# Patient Record
Sex: FEMALE | Race: WHITE | NOT HISPANIC OR LATINO | Employment: OTHER | URBAN - METROPOLITAN AREA
[De-identification: names, ages, dates, MRNs, and addresses within clinical notes are randomized per-mention and may not be internally consistent; named-entity substitution may affect disease eponyms.]

---

## 2017-01-15 ENCOUNTER — MYC MEDICAL ADVICE (OUTPATIENT)
Dept: PEDIATRICS | Facility: CLINIC | Age: 62
End: 2017-01-15

## 2017-01-26 ENCOUNTER — HOSPITAL ENCOUNTER (OUTPATIENT)
Facility: CLINIC | Age: 62
Setting detail: SPECIMEN
Discharge: HOME | End: 2017-01-26
Attending: INTERNAL MEDICINE | Admitting: INTERNAL MEDICINE
Payer: COMMERCIAL

## 2017-01-26 ENCOUNTER — ONCOLOGY VISIT (OUTPATIENT)
Dept: ONCOLOGY | Facility: CLINIC | Age: 62
End: 2017-01-26
Attending: INTERNAL MEDICINE
Payer: COMMERCIAL

## 2017-01-26 VITALS
HEART RATE: 65 BPM | RESPIRATION RATE: 16 BRPM | OXYGEN SATURATION: 97 % | TEMPERATURE: 98.5 F | WEIGHT: 161.2 LBS | SYSTOLIC BLOOD PRESSURE: 95 MMHG | HEIGHT: 68 IN | DIASTOLIC BLOOD PRESSURE: 69 MMHG | BODY MASS INDEX: 24.43 KG/M2

## 2017-01-26 DIAGNOSIS — D50.9 IRON DEFICIENCY ANEMIA, UNSPECIFIED IRON DEFICIENCY ANEMIA TYPE: ICD-10-CM

## 2017-01-26 DIAGNOSIS — C50.912 MALIGNANT NEOPLASM OF LEFT FEMALE BREAST, UNSPECIFIED SITE OF BREAST: Primary | ICD-10-CM

## 2017-01-26 LAB
ALBUMIN SERPL-MCNC: 3.9 G/DL (ref 3.4–5)
ALP SERPL-CCNC: 59 U/L (ref 40–150)
ALT SERPL W P-5'-P-CCNC: 23 U/L (ref 0–50)
ANION GAP SERPL CALCULATED.3IONS-SCNC: 6 MMOL/L (ref 3–14)
AST SERPL W P-5'-P-CCNC: 19 U/L (ref 0–45)
BILIRUB SERPL-MCNC: 0.3 MG/DL (ref 0.2–1.3)
BUN SERPL-MCNC: 17 MG/DL (ref 7–30)
CALCIUM SERPL-MCNC: 8.8 MG/DL (ref 8.5–10.1)
CHLORIDE SERPL-SCNC: 105 MMOL/L (ref 94–109)
CO2 SERPL-SCNC: 29 MMOL/L (ref 20–32)
CREAT SERPL-MCNC: 0.93 MG/DL (ref 0.52–1.04)
ERYTHROCYTE [DISTWIDTH] IN BLOOD BY AUTOMATED COUNT: 13.3 % (ref 10–15)
GFR SERPL CREATININE-BSD FRML MDRD: 61 ML/MIN/1.7M2
GLUCOSE SERPL-MCNC: 88 MG/DL (ref 70–99)
HCT VFR BLD AUTO: 35.8 % (ref 35–47)
HGB BLD-MCNC: 11.1 G/DL (ref 11.7–15.7)
IRON SATN MFR SERPL: 22 % (ref 15–46)
IRON SERPL-MCNC: 63 UG/DL (ref 35–180)
MCH RBC QN AUTO: 26.9 PG (ref 26.5–33)
MCHC RBC AUTO-ENTMCNC: 31 G/DL (ref 31.5–36.5)
MCV RBC AUTO: 87 FL (ref 78–100)
PLATELET # BLD AUTO: 385 10E9/L (ref 150–450)
POTASSIUM SERPL-SCNC: 4.3 MMOL/L (ref 3.4–5.3)
PROT SERPL-MCNC: 7.3 G/DL (ref 6.8–8.8)
RBC # BLD AUTO: 4.12 10E12/L (ref 3.8–5.2)
SODIUM SERPL-SCNC: 140 MMOL/L (ref 133–144)
TIBC SERPL-MCNC: 291 UG/DL (ref 240–430)
WBC # BLD AUTO: 4.9 10E9/L (ref 4–11)

## 2017-01-26 PROCEDURE — 83550 IRON BINDING TEST: CPT | Performed by: INTERNAL MEDICINE

## 2017-01-26 PROCEDURE — 83540 ASSAY OF IRON: CPT | Performed by: INTERNAL MEDICINE

## 2017-01-26 PROCEDURE — 80053 COMPREHEN METABOLIC PANEL: CPT | Performed by: INTERNAL MEDICINE

## 2017-01-26 PROCEDURE — 99211 OFF/OP EST MAY X REQ PHY/QHP: CPT

## 2017-01-26 PROCEDURE — 85027 COMPLETE CBC AUTOMATED: CPT | Performed by: INTERNAL MEDICINE

## 2017-01-26 PROCEDURE — 99214 OFFICE O/P EST MOD 30 MIN: CPT | Performed by: INTERNAL MEDICINE

## 2017-01-26 PROCEDURE — 36415 COLL VENOUS BLD VENIPUNCTURE: CPT

## 2017-01-26 RX ORDER — ANASTROZOLE 1 MG/1
1 TABLET ORAL DAILY
Qty: 90 TABLET | Refills: 3 | Status: SHIPPED | OUTPATIENT
Start: 2017-01-26 | End: 2018-02-21

## 2017-01-26 ASSESSMENT — PAIN SCALES - GENERAL: PAINLEVEL: NO PAIN (0)

## 2017-01-26 NOTE — PROGRESS NOTES
HISTORY OF PRESENT ILLNESS:  Mago Santiago is a 61-year-old patient who comes in today for interim followup.  She is here today for followup of left-sided breast cancer.  Mago was diagnosed with left-sided breast cancer in 09/2011, so she is just over 5 years out from her diagnosis.  She is continuing on adjuvant Arimidex.  She had an infiltrating ductal carcinoma of the left breast, ER/CT positive, HER-2 receptor negative.  She has no problems with the Arimidex and is happy to continue on it for the time being.      COMPREHENSIVE REVIEW OF SYSTEMS:  A 14-point comprehensive review of systems is remarkable for a good appetite, good energy level.  She has got no worrisome symptomatology.  She did have some lab work done in November which found that she was anemic so we are going to draw off iron studies today.  She does have celiac disease so she may have some issues with iron absorption.      MEDICATIONS:  As charted.      ALLERGIES:  As charted.      PHYSICAL EXAMINATION:   GENERAL:  She is well-appearing lady in no acute distress.   VITAL SIGNS:  Stable.   HEENT:  Oropharynx clear, mucous membranes moist.   NECK:  No masses or goiter.  There is no cervical, supraclavicular or infraclavicular adenopathy.   CHEST:  Clear to auscultation and percussion bilaterally.   HEART:  S1, S2 normal.  No added sounds or murmurs.   BREASTS:  The patient is status post left-sided lumpectomy, the scar looks good, no further palpable masses.  Right breast normal, no palpable masses, right and left axilla negative.   ABDOMEN:  Soft and nontender, no hepatosplenomegaly.   EXTREMITIES:  Legs are without tenderness or edema.   SKIN:  No rashes or lesions.   LYMPHATIC:  No evidence of lymphedema.      DATA REVIEW:  Labs are pending at time of dictation.      IMPRESSION:  A 61-year-old patient with a history of left-sided breast cancer.  She is just over 5 years out from her diagnosis.  She will continue on Arimidex.  I have ordered  routine blood work today to include iron studies.  She also needs a mammogram so have written her up for a routine mammogram.  I will be in touch with her with the results.  I will see her back in 6 months.         NESS MARCELINO MD             D: 2017 14:37   T: 2017 14:50   MT: ADA      Name:     DONATO FLORES   MRN:      [id number]        Account:      [id number]   :      1955           Visit Date:   2017      Document: [id number]

## 2017-01-26 NOTE — PROGRESS NOTES
"Mago Santiago is a 61 year old female who presents for:  Chief Complaint   Patient presents with     Oncology Clinic Visit     Malignant neoplasm of left female breast-Follow up        Initial Vitals:  Temp(Src) 98.5  F (36.9  C) (Tympanic)  Resp 16  Ht 1.727 m (5' 8\")  Wt 73.12 kg (161 lb 3.2 oz)  BMI 24.52 kg/m2 Estimated body mass index is 24.52 kg/(m^2) as calculated from the following:    Height as of this encounter: 1.727 m (5' 8\").    Weight as of this encounter: 73.12 kg (161 lb 3.2 oz).. Body surface area is 1.87 meters squared. BP completed using cuff size: regular  No Pain (0) No LMP recorded. Patient is postmenopausal. Allergies and medications reviewed.     Medications: Medication refills not needed today.  Pharmacy name entered into CIBDO: [facility] [id number] [place] [address] - [address] [facility]  AT [facility]    Comments: Follow up, patient would like a refill on arimidex    8 minutes for nursing intake (face to face time)   Beth Bower  DISCHARGE PLAN:  Next appointments: See patient instruction section  Departure Mode: Ambulatory  Accompanied by: self  0 minutes for nursing discharge (face to face time)   Beth Bower            "

## 2017-01-26 NOTE — PATIENT INSTRUCTIONS
Labs today -Beth       mammo next avail-Scheduled for 1/31/2017 @ 4:15 at [facility], per Pt request. Enriqueta HOYOS      RTC MD 6 months-Scheduled for 7/26/2017 @ 11:30. Enriqueta KERR printed and given to Pt. Aida.

## 2017-01-26 NOTE — MR AVS SNAPSHOT
After Visit Summary   1/26/2017    Mago Santiago    MRN: [id number]           Patient Information     Date Of Birth          1955        Visit Information        Provider Department      1/26/2017 1:30 PM Joyce Barillas MD [facility] RH Oncology [facility]      Today's Diagnoses     Malignant neoplasm of left female breast, unspecified site of breast (H)    -  1     Iron deficiency anemia, unspecified iron deficiency anemia type           Care Instructions    Labs today -Beth       mammo next avail-Scheduled for 1/31/2017 @ 4:15 at [facility], per Pt request. Enriqueta HOYOS      RTARGENTINA MAN 6 months-Scheduled for 7/26/2017 @ 11:30. Enriqueta HOYOS  AVS printed and given to Pt. Aida.          Follow-ups after your visit        Your next 10 appointments already scheduled     Jan 31, 2017  4:15 PM   MA SCREENING BILATERAL W/ RAMÓN with EAMA1   [facility] ([facility])    [address] ,[address]  [facility] [id number] [phone]           Do not use any powder, lotion or deodorant under your arms or on your breast. If you do, we will ask you to remove it before your exam.  Wear comfortable, two-piece clothing.  If you have any allergies, tell your care team.  Bring any previous mammograms from other facilities or have them mailed to the breast center.            Jul 26, 2017 11:30 AM   Return Visit with Joyce Barillas MD   [facility] ([facility])    [facility]  [address]  [facility] [address] [phone]              Future tests that were ordered for you today     Open Future Orders        Priority Expected Expires Ordered    MA Screen Bilateral w/Ramón Routine 1/30/2017 1/26/2018 1/26/2017            Who to contact     If you have questions or need follow up information about today's clinic visit or your schedule please contact [facility]  "CARE directly at [phone].  Normal or non-critical lab and imaging results will be communicated to you by MyChart, letter or phone within 4 business days after the clinic has received the results. If you do not hear from us within 7 days, please contact the clinic through [employer] or phone. If you have a critical or abnormal lab result, we will notify you by phone as soon as possible.  Submit refill requests through [email or web address] or call your pharmacy and they will forward the refill request to us. Please allow 3 business days for your refill to be completed.          Additional Information About Your Visit        [employer] Information     [email or web address] gives you secure access to your electronic health record. If you see a primary care provider, you can also send messages to your care team and make appointments. If you have questions, please call your primary care clinic.  If you do not have a primary care provider, please call [phone] and they will assist you.        Care EveryWhere ID     This is your Care EveryWhere ID. This could be used by other organizations to access your [place] medical records  [id number]        Your Vitals Were     Pulse Temperature Respirations Height BMI (Body Mass Index) Pulse Oximetry    65 98.5  F (36.9  C) (Tympanic) 16 1.727 m (5' 8\") 24.52 kg/m2 97%       Blood Pressure from Last 3 Encounters:   01/26/17 95/69   11/22/16 100/60   09/09/16 104/62    Weight from Last 3 Encounters:   01/26/17 73.12 kg (161 lb 3.2 oz)   11/22/16 73.256 kg (161 lb 8 oz)   09/09/16 71.782 kg (158 lb 4 oz)              We Performed the Following     CBC with platelets     Comprehensive metabolic panel (BMP + Alb, Alk Phos, ALT, AST, Total. Bili, TP)     Iron and iron binding capacity          Where to get your medicines      These medications were sent to [id number] - [place], [place] - [address]  AT [facility]  [address] [place] [address]     Phone:  " [phone]    - anastrozole 1 MG tablet       Primary Care Provider Office Phone # Fax #    Hanny Kunz [phone]       [facility] [place] [address] [place] [address]        Thank you!     Thank you for choosing [facility]  for your care. Our goal is always to provide you with excellent care. Hearing back from our patients is one way we can continue to improve our services. Please take a few minutes to complete the written survey that you may receive in the mail after your visit with us. Thank you!             Your Updated Medication List - Protect others around you: Learn how to safely use, store and throw away your medicines at www.disposemymeds.org.          This list is accurate as of: 1/26/17  2:50 PM.  Always use your most recent med list.                   Brand Name Dispense Instructions for use    anastrozole 1 MG tablet    ARIMIDEX    90 tablet    Take 1 tablet (1 mg) by mouth daily       ASPIRIN PO      Take 81 mg by mouth       FLUoxetine 20 MG capsule    PROzac    90 capsule    Take 1 capsule (20 mg) by mouth daily       multivitamin, therapeutic Tabs tablet      Take 1 tablet by mouth daily

## 2017-01-27 ENCOUNTER — TELEPHONE (OUTPATIENT)
Dept: ONCOLOGY | Facility: CLINIC | Age: 62
End: 2017-01-27

## 2017-01-27 NOTE — TELEPHONE ENCOUNTER
Pt called and informed that iron studies normal and hgb improving at 11.1 per Dr Barillas.  No further recommendations at this time but left message for pt to call back with further questions or concerns.    Mirta Lamb RN, BSN

## 2017-01-31 ENCOUNTER — RADIANT APPOINTMENT (OUTPATIENT)
Dept: MAMMOGRAPHY | Facility: CLINIC | Age: 62
End: 2017-01-31
Attending: INTERNAL MEDICINE
Payer: COMMERCIAL

## 2017-01-31 DIAGNOSIS — C50.912 MALIGNANT NEOPLASM OF LEFT FEMALE BREAST, UNSPECIFIED SITE OF BREAST: ICD-10-CM

## 2017-01-31 PROCEDURE — G0202 SCR MAMMO BI INCL CAD: HCPCS | Mod: TC

## 2017-01-31 PROCEDURE — 77063 BREAST TOMOSYNTHESIS BI: CPT | Mod: TC

## 2017-03-26 ENCOUNTER — VIRTUAL VISIT (OUTPATIENT)
Dept: FAMILY MEDICINE | Facility: OTHER | Age: 62
End: 2017-03-26

## 2017-03-27 NOTE — PROGRESS NOTES
Date: [id number]  Clinician: Didier Odell  Clinician NPI: [id number]  Patient: Mago Santiago  Patient : 1956  Patient Address: [facility] [place] [address]  Patient Phone: [phone]  Visit Protocol: UTI  Patient Summary:  Mago is a 60 year old ( : 1956 ) female who initiated a Zip for a presumed bladder infection.     Her symptoms began yesterday and consist of urgency, dysuria, urinary frequency, and foul smelling urine.   Symptom Details   Urinary Frequency: Several times each hour    She denies nausea, loss of appetite, chills, fever, urinary incontinence, hesitation, vaginal discharge, abdominal pain, vomiting, recent antibiotic use, flank pain, and hematuria. Mago has never had kidney stones. She has not been hospitalized, been a patient in a nursing home, or had a catheter in the past two weeks. She denies risk factors for sexually transmitted infections.   Mago has not had any UTIs in the past 12 months. Her current symptoms are similar to the previous UTI symptoms. She took ciprofloxacin for her last infection and found it to be effective.   She has experienced side effects (upset stomach, vomiting, or diarrhea) from taking Bactrim DS (trimethoprim/sulfamethoxazole). Mago does not get yeast infections when she takes antibiotics.   She states she is not pregnant and denies breastfeeding. She no longer menstruates.   She does NOT smoke or use smokeless tobacco.   MEDICATIONS:   Patient free text response:  Anastozole  , ALLERGIES:  NKDA   Clinician Response:  Dear Mago,  Based on the information you have provided, you likely have a bladder infection, also called an acute urinary tract infection (UTI).   To treat your infection, I am prescribing:   Nitrofurantoin (Macrobid). Swallow one (1) tablet twice a day for 5 days. Take the tablet with food. Continue taking the tablets even if you feel better before all the medication is gone. There is no refill with this  prescription.   Antibiotic selections by the clinician are based on safety and effectiveness. You may or may not be prescribed the same medication that you took for your last bladder infection.   Some people develop allergies to antibiotics. If you notice a new rash, significant swelling, or difficulty breathing, stop the medication immediately and go into a clinic for physical evaluation.   To help treat your current UTI and prevent future occurrences, remember to:     Drink 8-10, 8-ounce glasses of water daily.    Urinate after sexual intercourse.    Wipe front to back after using the bathroom.     Some women may develop a yeast infection as a side effect of taking antibiotics. If you notice symptoms of a yeast infection, Zipnosis can help treat that condition as well. Simply log in and complete another Zip, which will cover all of the necessary questions to determine the best treatment for you.   You should visit a clinic for a follow-up visit if your symptoms do not improve in 1-2 days or if you experience another urinary tract infection soon after completing this treatment.  If you become pregnant during this course of treatment, stop taking the medication and contact your primary care clinician.   Diagnosis: Acute Uncomplicated Bladder Infection  Diagnosis ICD: N39.0  Prescription: nitrofurantoin (Macrobid) 100mg oral tablet 10 tablets, 5 days supply. Take one tablet by mouth two times a day for 5 days. Refills: 0, Refill as needed: no, Allow substitutions: yes

## 2017-03-28 ENCOUNTER — TRANSFERRED RECORDS (OUTPATIENT)
Dept: HEALTH INFORMATION MANAGEMENT | Facility: CLINIC | Age: 62
End: 2017-03-28

## 2017-07-26 ENCOUNTER — ONCOLOGY VISIT (OUTPATIENT)
Dept: ONCOLOGY | Facility: CLINIC | Age: 62
End: 2017-07-26
Attending: INTERNAL MEDICINE
Payer: COMMERCIAL

## 2017-07-26 VITALS
DIASTOLIC BLOOD PRESSURE: 69 MMHG | BODY MASS INDEX: 23.49 KG/M2 | OXYGEN SATURATION: 97 % | SYSTOLIC BLOOD PRESSURE: 102 MMHG | RESPIRATION RATE: 16 BRPM | WEIGHT: 155 LBS | HEIGHT: 68 IN | TEMPERATURE: 98.4 F | HEART RATE: 68 BPM

## 2017-07-26 DIAGNOSIS — Z17.0 MALIGNANT NEOPLASM OF LEFT BREAST IN FEMALE, ESTROGEN RECEPTOR POSITIVE, UNSPECIFIED SITE OF BREAST (H): Primary | ICD-10-CM

## 2017-07-26 DIAGNOSIS — C50.912 MALIGNANT NEOPLASM OF LEFT BREAST IN FEMALE, ESTROGEN RECEPTOR POSITIVE, UNSPECIFIED SITE OF BREAST (H): Primary | ICD-10-CM

## 2017-07-26 PROCEDURE — 99211 OFF/OP EST MAY X REQ PHY/QHP: CPT

## 2017-07-26 PROCEDURE — 99213 OFFICE O/P EST LOW 20 MIN: CPT | Performed by: INTERNAL MEDICINE

## 2017-07-26 ASSESSMENT — PAIN SCALES - GENERAL: PAINLEVEL: NO PAIN (0)

## 2017-07-26 NOTE — MR AVS SNAPSHOT
After Visit Summary   7/26/2017    Mago Santiago    MRN: [id number]           Patient Information     Date Of Birth          1955        Visit Information        Provider Department      7/26/2017 8:00 AM Joyce Barillas MD [facility] RH Oncology [facility]      Today's Diagnoses     Malignant neoplasm of left breast in female, estrogen receptor positive, unspecified site of breast (H)    -  1      Care Instructions        RTC MD    6 months       Labs and bone density on return     Mammo DEC or JAN 07/31/2017 left message for patient to call and schedule. Jovita Guzmán          Follow-ups after your visit        Your next 10 appointments already scheduled     Dec 21, 2017  3:30 PM CST   DX HIP/PELVIS/SPINE with EADX1   [facility]    [address] [place] [address] [phone]           Please do not take any of the following 24 hours prior to the day of your exam: vitamins, calcium tablets, antacids.  If possible, please wear clothes without metal (snaps, zippers). A sweatsuit works well.              Who to contact     If you have questions or need follow up information about today's clinic visit or your schedule please contact [facility]CER CARE directly at [phone].  Normal or non-critical lab and imaging results will be communicated to you by MyChart, letter or phone within 4 business days after the clinic has received the results. If you do not hear from us within 7 days, please contact the clinic through MyChart or phone. If you have a critical or abnormal lab result, we will notify you by phone as soon as possible.  Submit refill requests through [employer] or call your pharmacy and they will forward the refill request to us. Please allow 3 business days for your refill to be completed.          Additional Information About Your Visit        MyChart Information      "[employer] gives you secure access to your electronic health record. If you see a primary care provider, you can also send messages to your care team and make appointments. If you have questions, please call your primary care clinic.  If you do not have a primary care provider, please call [phone] and they will assist you.        Care EveryWhere ID     This is your Care EveryWhere ID. This could be used by other organizations to access your [facility] medical records  [id number]        Your Vitals Were     Pulse Temperature Respirations Height Pulse Oximetry BMI (Body Mass Index)    68 98.4  F (36.9  C) (Tympanic) 16 1.727 m (5' 8\") 97% 23.57 kg/m2       Blood Pressure from Last 3 Encounters:   07/26/17 102/69   01/26/17 95/69   11/22/16 100/60    Weight from Last 3 Encounters:   07/26/17 70.3 kg (155 lb)   01/26/17 73.1 kg (161 lb 3.2 oz)   11/22/16 73.3 kg (161 lb 8 oz)               Primary Care Provider Office Phone # Fax #    Hanny Kunz [phone]       [facility] [place] [address] [place] [address]        Equal Access to Services     RICK BISHOP AH: Hadii aad ku hadasho Soomaali, waaxda luqadaha, qaybta kaalmada adeegyada, waxay idiin haydoloresn simon schmidt lajohanny jonas. So [facility] [phone].    ATENCIÓN: Si habla español, tiene a mckeon disposición servicios gratuitos de asistencia lingüística. Llame al [phone].    We comply with applicable federal civil rights laws and [place] laws. We do not discriminate on the basis of race, color, national origin, age, disability sex, sexual orientation or gender identity.            Thank you!     Thank you for choosing [facility]  for your care. Our goal is always to provide you with excellent care. Hearing back from our patients is one way we can continue to improve our services. Please take a few minutes to complete the written survey that you may receive in the mail after your visit with us. Thank " you!             Your Updated Medication List - Protect others around you: Learn how to safely use, store and throw away your medicines at www.disposemymeds.org.          This list is accurate as of: 7/26/17 11:59 PM.  Always use your most recent med list.                   Brand Name Dispense Instructions for use Diagnosis    anastrozole 1 MG tablet    ARIMIDEX    90 tablet    Take 1 tablet (1 mg) by mouth daily    Malignant neoplasm of left female breast, unspecified site of breast       ASPIRIN PO      Take 81 mg by mouth        FLUoxetine 20 MG capsule    PROzac    90 capsule    Take 1 capsule (20 mg) by mouth daily    Anxiety       multivitamin, therapeutic Tabs tablet      Take 1 tablet by mouth daily

## 2017-07-26 NOTE — PATIENT INSTRUCTIONS
RTC MD    6 months       Labs and bone density on return     Mammo DEC or JAN 07/31/2017 left message for patient to call and schedule. Jovita Guzmán

## 2017-07-26 NOTE — PROGRESS NOTES
Hematology / Oncology Progress Note    Date of Service (when I saw the patient): 07/26/2017     Assessment & Plan   Mago Santiago is a 61 year old female breast cancer and she is coming up on 6 years out from her diagnosis her original diagnosis was September 2011. Her tumor was ER/RI positive HER-2 receptor negative. Her tumor was on the left breast. She's had no major problems with Arimidex we have decided to continue on it but we will do a bone density test at the end of this year to see if she's had any issues with her bones. She will also be a mammogram at the early part of 2018 and I will see her back in clinic after she has that done.        Joyce Barillas    Interval History   Mago Santiago is a 61-year-old patient who comes in today for interim followup.  She is here today for followup of left-sided breast cancer.  Mago was diagnosed with left-sided breast cancer in 09/2011, so she is just over 5 years out from her diagnosis.  She is continuing on adjuvant Arimidex.  She had an infiltrating ductal carcinoma of the left breast, ER/RI positive, HER-2 receptor negative.  She has no problems with the Arimidex and is happy to continue on it for the time being. She has no major complaints today 14 point review of systems is otherwise unremarkable.    Medications are as charted. Allergies are as charted     Physical Exam   Temp: 98.4  F (36.9  C) Temp src: Tympanic BP: 102/69 Pulse: 68   Resp: 16 SpO2: 97 %      Vitals:    07/26/17 0800   Weight: 70.3 kg (155 lb)     Vital Signs with Ranges  Temp:  [98.4  F (36.9  C)] 98.4  F (36.9  C)  Pulse:  [68] 68  Resp:  [16] 16  BP: (102)/(69) 102/69  SpO2:  [97 %] 97 %  Constitutional: awake, alert, cooperative, no apparent distress, and appears stated age  Eyes: Lids and lashes normal, pupils equal, round and reactive to light, extra ocular muscles intact, sclera clear, conjunctiva normal  ENT: Normocephalic, without obvious abnormality, atramatic,  sinuses nontender on palpation, external ears without lesions, oral pharynx with moist mucus membranes, tonsils without erythema or exudates, gums normal and good dentition.  Hematologic / Lymphatic: normal   Respiratory: No increased work of breathing, good air exchange, clear to auscultation bilaterally, no crackles or wheezing   Cardiovascular: Normal apical impulse, regular rate and rhythm, normal S1 and S2, no S3 or S4, and no murmur noted   GI:  Normal    Skin: normal skin color, texture, turgor  Neurologic: Awake, alert, oriented to name, place and time.             Data   No labs today

## 2017-07-26 NOTE — NURSING NOTE
"Oncology Rooming Note    July 26, 2017 8:03 AM   Mago Santiago is a 61 year old female who presents for:    Chief Complaint   Patient presents with     Oncology Clinic Visit     Malignant neoplasm of central portion of left female breast - follow up     Initial Vitals: /69  Pulse 68  Temp 98.4  F (36.9  C) (Tympanic)  Resp 16  Ht 1.727 m (5' 8\")  Wt 70.3 kg (155 lb)  SpO2 97%  BMI 23.57 kg/m2 Estimated body mass index is 23.57 kg/(m^2) as calculated from the following:    Height as of this encounter: 1.727 m (5' 8\").    Weight as of this encounter: 70.3 kg (155 lb). Body surface area is 1.84 meters squared.  No Pain (0) Comment: Data Unavailable   No LMP recorded. Patient is postmenopausal.  Allergies reviewed: Yes  Medications reviewed: Yes    Medications: Medication refills not needed today.  Pharmacy name entered into [employer]: [facility] [place], [place] - [address]  AT [facility]    Clinical concerns: Follow up     8 minutes for nursing intake (face to face time)     Cara Stephen CMA     DISCHARGE PLAN:  Next appointments: See patient instruction section  Departure Mode: Ambulatory  Accompanied by: self  0 minutes for nursing discharge (face to face time)   Cara Stephen CMA                  "

## 2017-08-15 ENCOUNTER — TRANSFERRED RECORDS (OUTPATIENT)
Dept: HEALTH INFORMATION MANAGEMENT | Facility: CLINIC | Age: 62
End: 2017-08-15

## 2017-09-21 ENCOUNTER — OFFICE VISIT (OUTPATIENT)
Dept: PEDIATRICS | Facility: CLINIC | Age: 62
End: 2017-09-21
Payer: COMMERCIAL

## 2017-09-21 VITALS
BODY MASS INDEX: 23.79 KG/M2 | SYSTOLIC BLOOD PRESSURE: 102 MMHG | OXYGEN SATURATION: 97 % | DIASTOLIC BLOOD PRESSURE: 60 MMHG | WEIGHT: 157 LBS | HEART RATE: 83 BPM | RESPIRATION RATE: 12 BRPM | HEIGHT: 68 IN

## 2017-09-21 DIAGNOSIS — Z12.11 SPECIAL SCREENING FOR MALIGNANT NEOPLASMS, COLON: ICD-10-CM

## 2017-09-21 DIAGNOSIS — M25.521 RIGHT ELBOW PAIN: Primary | ICD-10-CM

## 2017-09-21 DIAGNOSIS — F41.9 ANXIETY: ICD-10-CM

## 2017-09-21 PROCEDURE — 99214 OFFICE O/P EST MOD 30 MIN: CPT | Performed by: INTERNAL MEDICINE

## 2017-09-21 NOTE — NURSING NOTE
"Chief Complaint   Patient presents with     Musculoskeletal Problem       Initial /60 (BP Location: Right arm, Patient Position: Chair, Cuff Size: Adult Regular)  Pulse 83  Resp 12  Ht 5' 8\" (1.727 m)  Wt 157 lb (71.2 kg)  SpO2 97%  BMI 23.87 kg/m2 Estimated body mass index is 23.87 kg/(m^2) as calculated from the following:    Height as of this encounter: 5' 8\" (1.727 m).    Weight as of this encounter: 157 lb (71.2 kg).  Medication Reconciliation: complete  Anni Chino CMA  "

## 2017-09-21 NOTE — PROGRESS NOTES
SUBJECTIVE:   Mago Santiago is a 62 year old female who presents to clinic today for the following health issues:    Joint Pain    Onset: 3-4 weeks ago    Description:   Location: right elbow  Character: Sharp and shooting    Intensity: moderate    Progression of Symptoms: same    Accompanying Signs & Symptoms:  Other symptoms: radiation of pain to shoulder and hand occasionally     History:   Previous similar pain: no       Precipitating factors:   Trauma or overuse: no     Alleviating factors:  Improved by: NSAID - doesn't know if it helped or not, but didn't wake up during the night from the pain.    Therapies Tried and outcome: see above      Mago comes in for evaluation of R elbow pain that started about a month ago. Pain comes and goes. Seems to get worse when she fully straightens the arm, and sometimes when it is bent. Not worse with regular movement. Pain is mostly around elbow and in lateral elbow area. Will occasionally radiate down her arm. No other joint pain. No redness or injury.     Needs refill of fluoxetine for her anxiety. Stable, no medication side effects.     Problem list and histories reviewed & adjusted, as indicated.  Additional history: as documented    Patient Active Problem List   Diagnosis     Advanced directives, counseling/discussion     Malignant neoplasm of central portion of left female breast (HCC)     Family history of multiple sclerosis     Anxiety     Celiac disease     Past Surgical History:   Procedure Laterality Date     ENT SURGERY      wisdom teeth     LASIK  1995    right eye     LUMPECTOMY BREAST  2011    left ; breast cancer        Social History   Substance Use Topics     Smoking status: Never Smoker     Smokeless tobacco: Never Used     Alcohol use 0.0 oz/week     0 Standard drinks or equivalent per week      Comment: Seldom     Family History   Problem Relation Age of Onset     Genetic Disorder Mother      MS     Prostate Cancer Father      Genetic Disorder  "Sister      MS     DIABETES Daughter      Type 1             Reviewed and updated as needed this visit by clinical staffTobacco  Allergies  Meds  Med Hx  Surg Hx  Fam Hx  Soc Hx      ROS:  Constitutional, MSK systems are negative, except as otherwise noted.      OBJECTIVE:   /60 (BP Location: Right arm, Patient Position: Chair, Cuff Size: Adult Regular)  Pulse 83  Resp 12  Ht 5' 8\" (1.727 m)  Wt 157 lb (71.2 kg)  SpO2 97%  BMI 23.87 kg/m2  Body mass index is 23.87 kg/(m^2).  GENERAL: healthy, alert and no distress  MS: R elbow with full ROM, no swelling or erythema. Mild tenderness over lateral epicondyle and olecranon.     Diagnostic Test Results:  none     ASSESSMENT/PLAN:     1. Anxiety  Stable, medication refilled.   - FLUoxetine (PROZAC) 20 MG capsule; Take 1 capsule (20 mg) by mouth daily  Dispense: 90 capsule; Refill: 3    2. Right elbow pain  Likely lateral epicondylitis vs OA. Discussed management with NSAIDs, ice, and physical therapy. If not improving would obtain imaging and follow-up with sports med.   - [facility] PT, HAND, AND CHIROPRACTIC REFERRAL    3. Special screening for malignant neoplasms, colon  - GASTROENTEROLOGY ADULT REF PROCEDURE ONLY    Patient Instructions   Elbow pain:  -- probably tennis elbow or a little bit of arthritis  -- start Aleve twice daily with food for 4-5 days  -- ice twice daily  -- if you're not noticing improvement, follow-up with physical therapy    We can always send you off to Sports Medicine as well!    You will be called to set up the colonoscopy.         Hanny Nava MD  [facility]  "

## 2017-09-21 NOTE — MR AVS SNAPSHOT
After Visit Summary   9/21/2017    Mago Santiago    MRN: [id number]           Patient Information     Date Of Birth          1955        Visit Information        Provider Department      9/21/2017 2:30 PM Hanny Kunz MD [facility]        Today's Diagnoses     Right elbow pain    -  1    Anxiety        Special screening for malignant neoplasms, colon          Care Instructions    Elbow pain:  -- probably tennis elbow or a little bit of arthritis  -- start Aleve twice daily with food for 4-5 days  -- ice twice daily  -- if you're not noticing improvement, follow-up with physical therapy    We can always send you off to Sports Medicine as well!    You will be called to set up the colonoscopy.             Follow-ups after your visit        Additional Services     GASTROENTEROLOGY ADULT REF PROCEDURE ONLY       Last Lab Result: Creatinine (mg/dL)       Date                     Value                 01/26/2017               0.93             ----------  Body mass index is 23.87 kg/(m^2).      Patient will be contacted to schedule procedure.     Please be aware that coverage of these services is subject to the terms and limitations of your health insurance plan.  Call member services at your health plan with any benefit or coverage questions.  Any procedures must be performed at a [facility] facility OR coordinated by your clinic's referral office.    Please bring the following with you to your appointment:    (1) Any X-Rays, CTs or MRIs which have been performed.  Contact the facility where they were done to arrange for  prior to your scheduled appointment.    (2) List of current medications   (3) This referral request   (4) Any documents/labs given to you for this referral            [facility] PT, HAND, AND CHIROPRACTIC REFERRAL       **This order will print in the [facility] Scheduling Office**    Physical Therapy, Hand Therapy and Chiropractic Care are available through:    *[facility]  "[facility]  *[facility]  *[facility]    Call one number to schedule at any of the above locations: [phone].    Your provider has referred you to: Physical Therapy at [facility] or [facility]    Indication/Reason for Referral: Elbow Pain  Onset of Illness: 1 month  Therapy Orders: Evaluate and Treat  Special Programs: None  Special Request: None    Brittni Elkins      Additional Comments for the Therapist or Chiropractor:     Please be aware that coverage of these services is subject to the terms and limitations of your health insurance plan.  Call member services at your health plan with any benefit or coverage questions.      Please bring the following to your appointment:    *Your personal calendar for scheduling future appointments  *Comfortable clothing                  Your next 10 appointments already scheduled     Dec 21, 2017  3:30 PM CST   DX HIP/PELVIS/SPINE with EADX1   [facility] ([facility])    [address] [place] [address]  [address]  [facility] [address] [phone]           Please do not take any of the following 24 hours prior to the day of your exam: vitamins, calcium tablets, antacids.  If possible, please wear clothes without metal (snaps, zippers). A sweatsuit works well.            Feb 06, 2018  4:15 PM CST   MA SCREENING BILATERAL W/ EULALIA with EAMA1   [facility] ([facility])    [address] [place] [address],[address]  [facility] [address] [phone]           Three-dimensional (3D) mammograms are available at [facility] in [facility], [facility], [facility], [place], [place], and [place]. -Health locations include [facility] in [place]. Benefits of 3D mammograms include: - Improved rate of cancer detection - Decreases your chance of having to go back for more tests, which means fewer: - \"False-positive\" results (This means that there is an " abnormal area but it isn't cancer.) - Invasive testing procedures, such as a biopsy or surgery - Can provide clearer images of the breast if you have dense breast tissue. 3D mammography is an optional exam that anyone can have with a 2D mammogram. It doesn't replace or take the place of a 2D mammogram. 2D mammograms remain an effective screening test for all women.  Not all insurance companies cover the cost of a 3D mammogram. Check with your insurance.            Feb 14, 2018  2:30 PM CST   Return Visit with Joyce Barillas MD   [facility] ([facility])    [facility]  [address]  [facility] [address] [phone]              Who to contact     If you have questions or need follow up information about today's clinic visit or your schedule please contact [facility] directly at [phone].  Normal or non-critical lab and imaging results will be communicated to you by [employer], letter or phone within 4 business days after the clinic has received the results. If you do not hear from us within 7 days, please contact the clinic through [employer] or phone. If you have a critical or abnormal lab result, we will notify you by phone as soon as possible.  Submit refill requests through [employer] or call your pharmacy and they will forward the refill request to us. Please allow 3 business days for your refill to be completed.          Additional Information About Your Visit        [employer] Information     [employer] gives you secure access to your electronic health record. If you see a primary care provider, you can also send messages to your care team and make appointments. If you have questions, please call your primary care clinic.  If you do not have a primary care provider, please call [phone] and they will assist you.        Care EveryWhere ID     This is your Care EveryWhere ID. This could be used by other organizations to  "access your [facility] medical records  [id number]        Your Vitals Were     Pulse Respirations Height Pulse Oximetry BMI (Body Mass Index)       83 12 5' 8\" (1.727 m) 97% 23.87 kg/m2        Blood Pressure from Last 3 Encounters:   09/21/17 102/60   07/26/17 102/69   01/26/17 95/69    Weight from Last 3 Encounters:   09/21/17 157 lb (71.2 kg)   07/26/17 155 lb (70.3 kg)   01/26/17 161 lb 3.2 oz (73.1 kg)              We Performed the Following     GASTROENTEROLOGY ADULT REF PROCEDURE ONLY     [facility] PT, HAND, AND CHIROPRACTIC REFERRAL          Where to get your medicines      These medications were sent to [id number] [place] - [address]  AT [facility]  [address] [facility] [place] [address]     Phone:  [phone]     FLUoxetine 20 MG capsule          Primary Care Provider Office Phone # Fax #    Hanny Kunz [phone] [address] [place] [address]        Equal Access to Services     [facility]: Hadii aad ku hadasho Soomaali, waaxda luqadaha, qaybta kaalmada adeegyada, elli tianin hayaan adedenzel mcadams . So [facility] [place] [phone].    ATENCIÓN: Si habla español, tiene a mckeon disposición servicios gratuitos de asistencia lingüística. Ll[facility] [phone].    We comply with applicable federal civil rights laws and [place] laws. We do not discriminate on the basis of race, color, national origin, age, disability sex, sexual orientation or gender identity.            Thank you!     Thank you for choosing [facility]  for your care. Our goal is always to provide you with excellent care. Hearing back from our patients is one way we can continue to improve our services. Please take a few minutes to complete the written survey that you may receive in the mail after your visit with us. Thank you!             Your Updated Medication List - Protect others around you: Learn how to safely use, store and throw away your " medicines at www.disposemymeds.org.          This list is accurate as of: 9/21/17  2:55 PM.  Always use your most recent med list.                   Brand Name Dispense Instructions for use Diagnosis    anastrozole 1 MG tablet    ARIMIDEX    90 tablet    Take 1 tablet (1 mg) by mouth daily    Malignant neoplasm of left female breast, unspecified site of breast       ASPIRIN PO      Take 81 mg by mouth        FLUoxetine 20 MG capsule    PROzac    90 capsule    Take 1 capsule (20 mg) by mouth daily    Anxiety       multivitamin, therapeutic Tabs tablet      Take 1 tablet by mouth daily

## 2017-09-21 NOTE — PATIENT INSTRUCTIONS
Elbow pain:  -- probably tennis elbow or a little bit of arthritis  -- start Aleve twice daily with food for 4-5 days  -- ice twice daily  -- if you're not noticing improvement, follow-up with physical therapy    We can always send you off to Sports Medicine as well!    You will be called to set up the colonoscopy.

## 2017-09-26 ENCOUNTER — MYC MEDICAL ADVICE (OUTPATIENT)
Dept: PEDIATRICS | Facility: CLINIC | Age: 62
End: 2017-09-26

## 2017-09-27 NOTE — TELEPHONE ENCOUNTER
Sent message to [facility] informing of pt's colonoscopy and to update Health Maintenance     Milana Bhatia MA 9/27/2017 4:49 PM

## 2017-09-27 NOTE — TELEPHONE ENCOUNTER
See pt's [facility] message. Please update health maintenance for colonoscopy.     Called [facility], I was notified that pt's last colonoscopy was on 05/04/09. They will fax us the report to [phone].     Reyna, RN  Triage Nurse

## 2018-01-06 ENCOUNTER — VIRTUAL VISIT (OUTPATIENT)
Dept: FAMILY MEDICINE | Facility: OTHER | Age: 63
End: 2018-01-06

## 2018-01-06 NOTE — PROGRESS NOTES
"Date:   Clinician: Cristela Martinez  Clinician NPI: [id number]  Patient: Mago Santiago  Patient : 1956  Patient Address[facility] [place] [address]  Patient Phone: [phone]  Visit Protocol: UTI  Patient Summary:  Mago is a 61 year old ( : 1956 ) female who initiated a Visit for a presumed bladder infection. When asked the question \"Please sign me up to receive news, health information and promotions. \", Mago responded \"No\".    Her symptoms began 2 days ago and consist of dysuria, urinary frequency, foul smelling urine, and urgency.   Symptom Details   Urinary Frequency: Several times each hour    She denies abdominal pain, vomiting, hematuria, urinary incontinence, loss of appetite, chills, vaginal discharge, flank pain, nausea, recent antibiotic use, hesitation, and feeling feverish. Mago has never had kidney stones. She has not been hospitalized, been a patient in a nursing home, or had a catheter in the past two weeks. She denies risk factors for sexually transmitted infections.   Mago has had one (1) UTI in the past 12 months. Her most recent bladder infection was not within the last 4 weeks. Her current symptoms are similar to the previous UTI symptoms. She took an antibiotic for her last infection but does not remember which one.   Mago does not get yeast infections when she takes antibiotics.   She denies pregnancy and denies breastfeeding. She does not menstruate.   She does NOT smoke or use smokeless tobacco.   MEDICATIONS:   Patient free text response:  Arimidex  , ALLERGIES:  NKDA   Clinician Response:  Dear Mago,  Based on the information you have provided, you likely have a bladder infection, also called an acute urinary tract infection (UTI).   To treat your infection, I am prescribing:   Bactrim DS. Swallow one (1) tablet twice a day for 3 days to treat your bladder infection. Continue taking the tablets even if you feel better before all the " medication is gone. There is no refill with this prescription.   Some people develop allergies to antibiotics. If you notice a new rash, significant swelling, or difficulty breathing, stop the medication immediately and go into a clinic for physical evaluation.   To help treat your current UTI and prevent future occurrences, remember to:     Drink 8-10, 8-ounce glasses of water daily.    Urinate after sexual intercourse.    Wipe front to back after using the bathroom.     Some women may develop a yeast infection as a side effect of taking antibiotics. If you notice symptoms of a yeast infection, [facility] can help treat that condition as well. Simply log in and complete another Visit, which will cover all of the necessary questions to determine the best treatment for you.   You should visit a clinic for a follow-up visit if your symptoms do not improve in 1-2 days or if you experience another urinary tract infection soon after completing this treatment.  If you become pregnant during this course of treatment, stop taking the medication and contact your primary care provider.   Diagnosis: Acute Uncomplicated Bladder Infection  Diagnosis ICD: N39.0  Prescription: sulfamethoxazole-TMP DS (Bactrim DS) 800-160mg oral tablet 6 tablets, 3 days supply. Take one tablet by mouth two times a day for 3 days. Refills: 0, Refill as needed: no, Allow substitutions: yes  Pharmacy: [id number] - [phone] - [address] [facility] [place] [address]

## 2018-01-09 ENCOUNTER — OFFICE VISIT (OUTPATIENT)
Dept: URGENT CARE | Facility: URGENT CARE | Age: 63
End: 2018-01-09
Payer: COMMERCIAL

## 2018-01-09 VITALS
DIASTOLIC BLOOD PRESSURE: 62 MMHG | OXYGEN SATURATION: 98 % | HEART RATE: 70 BPM | BODY MASS INDEX: 23.81 KG/M2 | WEIGHT: 156.6 LBS | TEMPERATURE: 98.3 F | SYSTOLIC BLOOD PRESSURE: 98 MMHG

## 2018-01-09 DIAGNOSIS — R30.0 DYSURIA: ICD-10-CM

## 2018-01-09 DIAGNOSIS — N39.0 URINARY TRACT INFECTION WITHOUT HEMATURIA, SITE UNSPECIFIED: Primary | ICD-10-CM

## 2018-01-09 LAB
ALBUMIN UR-MCNC: NEGATIVE MG/DL
APPEARANCE UR: CLEAR
BILIRUB UR QL STRIP: NEGATIVE
COLOR UR AUTO: YELLOW
GLUCOSE UR STRIP-MCNC: NEGATIVE MG/DL
HGB UR QL STRIP: ABNORMAL
KETONES UR STRIP-MCNC: NEGATIVE MG/DL
LEUKOCYTE ESTERASE UR QL STRIP: NEGATIVE
NITRATE UR QL: NEGATIVE
NON-SQ EPI CELLS #/AREA URNS LPF: NORMAL /LPF
PH UR STRIP: 5.5 PH (ref 5–7)
RBC #/AREA URNS AUTO: NORMAL /HPF
SOURCE: ABNORMAL
SP GR UR STRIP: 1.01 (ref 1–1.03)
UROBILINOGEN UR STRIP-ACNC: 0.2 EU/DL (ref 0.2–1)
WBC #/AREA URNS AUTO: NORMAL /HPF

## 2018-01-09 PROCEDURE — 87086 URINE CULTURE/COLONY COUNT: CPT | Performed by: FAMILY MEDICINE

## 2018-01-09 PROCEDURE — 99213 OFFICE O/P EST LOW 20 MIN: CPT | Performed by: FAMILY MEDICINE

## 2018-01-09 PROCEDURE — 81001 URINALYSIS AUTO W/SCOPE: CPT | Performed by: PHYSICIAN ASSISTANT

## 2018-01-09 RX ORDER — NITROFURANTOIN 25; 75 MG/1; MG/1
100 CAPSULE ORAL 2 TIMES DAILY
Qty: 14 CAPSULE | Refills: 0 | Status: SHIPPED | OUTPATIENT
Start: 2018-01-09 | End: 2018-01-16

## 2018-01-09 NOTE — PROGRESS NOTES
SUBJECTIVE:   Mago Santiago is a 62 year old female who  presents today for a possible UTI.  Was given Rx for Bactrim x 3 days via [facility] virtual visit based on symptoms.  Symptoms of dysuria have improved but have no completed cleared.  She did have some light hematuria.  No fevers, chills, or flank pain.    Past Medical History:   Diagnosis Date     Anxiety      Breast cancer (H) 2011    diagnosed 2011- lumpectomy chemo and radaition and now on aromatase inhibitor     Celiac disease     endoscopy diagnosed     Current Outpatient Prescriptions   Medication Sig Dispense Refill     FLUoxetine (PROZAC) 20 MG capsule Take 1 capsule (20 mg) by mouth daily 90 capsule 3     anastrozole (ARIMIDEX) 1 MG tablet Take 1 tablet (1 mg) by mouth daily 90 tablet 3     ASPIRIN PO Take 81 mg by mouth       multivitamin, therapeutic (THERA-VIT) TABS Take 1 tablet by mouth daily       Social History   Substance Use Topics     Smoking status: Never Smoker     Smokeless tobacco: Never Used     Alcohol use 0.0 oz/week     0 Standard drinks or equivalent per week      Comment: Seldom       ROS:   No abdominal pain at this time.  No vaginal symptoms.    OBJECTIVE:  BP 98/62 (BP Location: Right arm, Patient Position: Chair, Cuff Size: Adult Regular)  Pulse 70  Temp 98.3  F (36.8  C) (Oral)  Wt 156 lb 9.6 oz (71 kg)  SpO2 98%  BMI 23.81 kg/m2  GENERAL APPEARANCE: healthy, alert and no distress  BACK: No CVA tenderness    UA shows small blood.  Micro shows 0-2 WBC and 0-2 RBC.    ASSESSMENT:   Continued dysuria following treatment for presumed UTI via virtual visit    No evidence of UTI on today's UA but will send urine for culture to confirm.    PLAN:  Given persistence of symptoms, will change to nitrofurantoin 100 mg BID x 7 days.  Culture pending--will adjust antibiotics further if sensitivity results indicate that is needed.

## 2018-01-09 NOTE — NURSING NOTE
"Chief Complaint   Patient presents with     Urgent Care     Urinary Problem     Pt had UTI, rx: bactrim. Pt states symptoms did not fully go away.        Initial BP 98/62 (BP Location: Right arm, Patient Position: Chair, Cuff Size: Adult Regular)  Pulse 70  Temp 98.3  F (36.8  C) (Oral)  Wt 156 lb 9.6 oz (71 kg)  SpO2 98%  BMI 23.81 kg/m2 Estimated body mass index is 23.81 kg/(m^2) as calculated from the following:    Height as of 9/21/17: 5' 8\" (1.727 m).    Weight as of this encounter: 156 lb 9.6 oz (71 kg).  Medication Reconciliation: unable or not appropriate to perform   Citlaly Buitrago Medical Assistant    "

## 2018-01-09 NOTE — MR AVS SNAPSHOT
"              After Visit Summary   1/9/2018    Mago Santiago    MRN: [id number]           Patient Information     Date Of Birth          1955        Visit Information        Provider Department      1/9/2018 3:35 PM Drea Hui MD [facility]        Today's Diagnoses     Urinary tract infection without hematuria, site unspecified    -  1    Dysuria           Follow-ups after your visit        Your next 10 appointments already scheduled     Feb 06, 2018  4:15 PM CST   MA SCREENING BILATERAL W/ EULALIA with EAMA1   [facility]    [address]  [facility] [id number] [phone]           Three-dimensional (3D) mammograms are available at [facility] locations in [facility], [facility], [facility], [place], [place], and [place]. [facility] locations include [place] and [facility] in [place]. Benefits of 3D mammograms include: - Improved rate of cancer detection - Decreases your chance of having to go back for more tests, which means fewer: - \"False-positive\" results (This means that there is an abnormal area but it isn't cancer.) - Invasive testing procedures, such as a biopsy or surgery - Can provide clearer images of the breast if you have dense breast tissue. 3D mammography is an optional exam that anyone can have with a 2D mammogram. It doesn't replace or take the place of a 2D mammogram. 2D mammograms remain an effective screening test for all women.  Not all insurance companies cover the cost of a 3D mammogram. Check with your insurance.            Feb 14, 2018  2:30 PM CST   Return Visit with Joyce Barillas MD   [facility] ([facility])    [facility]  [address]  [facility] [id number] [phone]              Who to contact     If you have questions or need follow up information about today's clinic " visit or your schedule please contact [facility] directly at [phone].  Normal or non-critical lab and imaging results will be communicated to you by OPENLANEhart, letter or phone within 4 business days after the clinic has received the results. If you do not hear from us within 7 days, please contact the clinic through MediaTrovet or phone. If you have a critical or abnormal lab result, we will notify you by phone as soon as possible.  Submit refill requests through StreetLight Data or call your pharmacy and they will forward the refill request to us. Please allow 3 business days for your refill to be completed.          Additional Information About Your Visit        OPENLANEharTwitty [employer] Information     StreetLight Data gives you secure access to your electronic health record. If you see a primary care provider, you can also send messages to your care team and make appointments. If you have questions, please call your primary care clinic.  If you do not have a primary care provider, please call [phone] and they will assist you.        Care EveryWhere ID     This is your Care EveryWhere ID. This could be used by other organizations to access your [facility] medical records  [id number]        Your Vitals Were     Pulse Temperature Pulse Oximetry BMI (Body Mass Index)          70 98.3  F (36.8  C) (Oral) 98% 23.81 kg/m2         Blood Pressure from Last 3 Encounters:   01/09/18 98/62   09/21/17 102/60   07/26/17 102/69    Weight from Last 3 Encounters:   01/09/18 156 lb 9.6 oz (71 kg)   09/21/17 157 lb (71.2 kg)   07/26/17 155 lb (70.3 kg)              We Performed the Following     UA reflex to Microscopic and Culture     Urine Culture Aerobic Bacterial     Urine Microscopic          Today's Medication Changes          These changes are accurate as of 1/9/18 11:59 PM.  If you have any questions, ask your nurse or doctor.               Start taking these medicines.        Dose/Directions    nitroFURantoin (macrocrystal-monohydrate)  100 MG capsule   Commonly known as:  MACROBID   Used for:  Urinary tract infection without hematuria, site unspecified   Started by:  Drea Hui MD        Dose:  100 mg   Take 1 capsule (100 mg) by mouth 2 times daily for 7 days   Quantity:  14 capsule   Refills:  0            Where to get your medicines      These medications were sent to [id number] - [place] - [address]  AT [address]  [address] [place] [address]     Phone:  [phone]     nitroFURantoin (macrocrystal-monohydrate) 100 MG capsule                Primary Care Provider Office Phone # Fax #    Hanny Kunz [phone] [address] [place] [address]        Equal Access to Services     [facility]: Hadii marc ku hadasho Soomaali, waaxda luqadaha, qaybta kaalmada adeegyada, elli carey haycarmelita mcadams . So [facility] [phone].    ATENCIÓN: Si habla español, tiene a mckeon disposición servicios gratuitos de asistencia lingüística. Llame al [phone].    We comply with applicable federal civil rights laws and [place] laws. We do not discriminate on the basis of race, color, national origin, age, disability, sex, sexual orientation, or gender identity.            Thank you!     Thank you for choosing [facility]  for your care. Our goal is always to provide you with excellent care. Hearing back from our patients is one way we can continue to improve our services. Please take a few minutes to complete the written survey that you may receive in the mail after your visit with us. Thank you!             Your Updated Medication List - Protect others around you: Learn how to safely use, store and throw away your medicines at www.disposemymeds.org.          This list is accurate as of 1/9/18 11:59 PM.  Always use your most recent med list.                   Brand Name Dispense Instructions for use Diagnosis    anastrozole 1 MG  tablet    ARIMIDEX    90 tablet    Take 1 tablet (1 mg) by mouth daily    Malignant neoplasm of left female breast, unspecified site of breast       ASPIRIN PO      Take 81 mg by mouth        FLUoxetine 20 MG capsule    PROzac    90 capsule    Take 1 capsule (20 mg) by mouth daily    Anxiety       multivitamin, therapeutic Tabs tablet      Take 1 tablet by mouth daily        nitroFURantoin (macrocrystal-monohydrate) 100 MG capsule    MACROBID    14 capsule    Take 1 capsule (100 mg) by mouth 2 times daily for 7 days    Urinary tract infection without hematuria, site unspecified

## 2018-01-10 LAB
BACTERIA SPEC CULT: NORMAL
BACTERIA SPEC CULT: NORMAL
SPECIMEN SOURCE: NORMAL

## 2018-01-23 ENCOUNTER — RADIANT APPOINTMENT (OUTPATIENT)
Dept: BONE DENSITY | Facility: CLINIC | Age: 63
End: 2018-01-23
Attending: INTERNAL MEDICINE
Payer: COMMERCIAL

## 2018-01-23 PROCEDURE — 77080 DXA BONE DENSITY AXIAL: CPT | Performed by: FAMILY MEDICINE

## 2018-02-06 ENCOUNTER — RADIANT APPOINTMENT (OUTPATIENT)
Dept: MAMMOGRAPHY | Facility: CLINIC | Age: 63
End: 2018-02-06
Attending: INTERNAL MEDICINE
Payer: COMMERCIAL

## 2018-02-06 DIAGNOSIS — Z17.0 MALIGNANT NEOPLASM OF LEFT BREAST IN FEMALE, ESTROGEN RECEPTOR POSITIVE, UNSPECIFIED SITE OF BREAST (H): ICD-10-CM

## 2018-02-06 DIAGNOSIS — C50.912 MALIGNANT NEOPLASM OF LEFT BREAST IN FEMALE, ESTROGEN RECEPTOR POSITIVE, UNSPECIFIED SITE OF BREAST (H): ICD-10-CM

## 2018-02-06 PROCEDURE — 77063 BREAST TOMOSYNTHESIS BI: CPT | Mod: TC

## 2018-02-06 PROCEDURE — 77067 SCR MAMMO BI INCL CAD: CPT | Mod: TC

## 2018-02-14 ENCOUNTER — HOSPITAL ENCOUNTER (OUTPATIENT)
Facility: CLINIC | Age: 63
Setting detail: SPECIMEN
Discharge: HOME | End: 2018-02-14
Attending: INTERNAL MEDICINE | Admitting: INTERNAL MEDICINE
Payer: COMMERCIAL

## 2018-02-14 ENCOUNTER — ONCOLOGY VISIT (OUTPATIENT)
Dept: ONCOLOGY | Facility: CLINIC | Age: 63
End: 2018-02-14
Attending: INTERNAL MEDICINE
Payer: COMMERCIAL

## 2018-02-14 VITALS
TEMPERATURE: 97.4 F | SYSTOLIC BLOOD PRESSURE: 97 MMHG | RESPIRATION RATE: 16 BRPM | HEART RATE: 71 BPM | HEIGHT: 68 IN | OXYGEN SATURATION: 98 % | DIASTOLIC BLOOD PRESSURE: 63 MMHG | WEIGHT: 157 LBS | BODY MASS INDEX: 23.79 KG/M2

## 2018-02-14 DIAGNOSIS — C50.912 MALIGNANT NEOPLASM OF LEFT BREAST IN FEMALE, ESTROGEN RECEPTOR POSITIVE, UNSPECIFIED SITE OF BREAST (H): Primary | ICD-10-CM

## 2018-02-14 DIAGNOSIS — Z17.0 MALIGNANT NEOPLASM OF LEFT BREAST IN FEMALE, ESTROGEN RECEPTOR POSITIVE, UNSPECIFIED SITE OF BREAST (H): Primary | ICD-10-CM

## 2018-02-14 LAB
ALBUMIN SERPL-MCNC: 3.9 G/DL (ref 3.4–5)
ALP SERPL-CCNC: 57 U/L (ref 40–150)
ALT SERPL W P-5'-P-CCNC: 43 U/L (ref 0–50)
ANION GAP SERPL CALCULATED.3IONS-SCNC: 5 MMOL/L (ref 3–14)
AST SERPL W P-5'-P-CCNC: 25 U/L (ref 0–45)
BILIRUB SERPL-MCNC: 0.3 MG/DL (ref 0.2–1.3)
BUN SERPL-MCNC: 12 MG/DL (ref 7–30)
CALCIUM SERPL-MCNC: 8.8 MG/DL (ref 8.5–10.1)
CHLORIDE SERPL-SCNC: 106 MMOL/L (ref 94–109)
CO2 SERPL-SCNC: 29 MMOL/L (ref 20–32)
CREAT SERPL-MCNC: 0.89 MG/DL (ref 0.52–1.04)
ERYTHROCYTE [DISTWIDTH] IN BLOOD BY AUTOMATED COUNT: 14.2 % (ref 10–15)
GFR SERPL CREATININE-BSD FRML MDRD: 64 ML/MIN/1.7M2
GLUCOSE SERPL-MCNC: 91 MG/DL (ref 70–99)
HCT VFR BLD AUTO: 33.2 % (ref 35–47)
HGB BLD-MCNC: 10.7 G/DL (ref 11.7–15.7)
MCH RBC QN AUTO: 27.4 PG (ref 26.5–33)
MCHC RBC AUTO-ENTMCNC: 32.2 G/DL (ref 31.5–36.5)
MCV RBC AUTO: 85 FL (ref 78–100)
PLATELET # BLD AUTO: 355 10E9/L (ref 150–450)
POTASSIUM SERPL-SCNC: 4.1 MMOL/L (ref 3.4–5.3)
PROT SERPL-MCNC: 7.2 G/DL (ref 6.8–8.8)
RBC # BLD AUTO: 3.9 10E12/L (ref 3.8–5.2)
SODIUM SERPL-SCNC: 140 MMOL/L (ref 133–144)
WBC # BLD AUTO: 6.6 10E9/L (ref 4–11)

## 2018-02-14 PROCEDURE — 99213 OFFICE O/P EST LOW 20 MIN: CPT | Performed by: INTERNAL MEDICINE

## 2018-02-14 PROCEDURE — 36415 COLL VENOUS BLD VENIPUNCTURE: CPT

## 2018-02-14 PROCEDURE — G0463 HOSPITAL OUTPT CLINIC VISIT: HCPCS

## 2018-02-14 PROCEDURE — 80053 COMPREHEN METABOLIC PANEL: CPT | Performed by: INTERNAL MEDICINE

## 2018-02-14 PROCEDURE — 85027 COMPLETE CBC AUTOMATED: CPT | Performed by: INTERNAL MEDICINE

## 2018-02-14 ASSESSMENT — PAIN SCALES - GENERAL: PAINLEVEL: NO PAIN (0)

## 2018-02-14 NOTE — LETTER
2/14/2018         RE: Mago Santiago  [address] [place] [address]        Dear Colleague,    Thank you for referring your patient, Mago Santiago, to the [facility]. Please see a copy of my visit note below.    Visit Date:   02/14/2018      HISTORY OF PRESENT ILLNESS:  Mago Santiago is 62 years old who comes in today for interim followup.  She is here for followup of her left-sided breast cancer.  She was diagnosed with left-sided breast cancer in 09/2011.  She is over 6 years out from her diagnosis.  She continues on adjuvant Arimidex.  We have decided to follow her with a bone density test, and as long as that stays stable, we are going to continue on the Arimidex for another couple of years.  I have reviewed with her today a recent bone density which was done on 01/25/2018.  She has got some mild osteopenia but overall she has got a good bone density.  She also had a screening mammogram done on 02/06/2018, which I have reviewed with her today and that was normal.  She feels well today.  She is getting ready for residential in 06/2018.      REVIEW OF SYSTEMS:  A 14-point comprehensive review of systems is otherwise unremarkable today.      MEDICATIONS:  Are as charted.      ALLERGIES:  As charted.      PHYSICAL EXAMINATION:    GENERAL:  She is a well-appearing lady in no acute distress.   VITAL SIGNS:  Stable.   HEENT:  Oropharynx clear, mucous membranes moist.   NECK:  Has no masses or goiter there.  There is no cervical, supraclavicular or infraclavicular adenopathy.   CHEST:  Clear to auscultation and percussion bilaterally.   HEART:  S1, S2 normal.  No added sounds, no murmurs.   ABDOMEN:  Soft and nontender, no hepatosplenomegaly.   SKIN:  Has no rashes or lesions.     LYMPHATICS:  No evidence of lymphedema.   BREASTS:  Patient is status post left-sided lumpectomy.  The scar looks good.  No further palpable masses.  Right breast normal; no palpable masses.  Right and  left axilla negative.      DATA REVIEW:  Labs are pending at time of dictation.      IMPRESSION:  A 62-year-old patient with a history of left-sided breast cancer diagnosed in .  In 2018, she will be 7 years out from her diagnosis.  She is currently on Arimidex.  We are going to continue on the Arimidex as long as her bone density tolerates it.  I will see her back in my clinic in 6 months.  I have ordered routine blood work today.         NESS BARILLAS MD             D: 02/15/2018   T: 02/15/2018   MT:       Name:     DONATO FLORES   MRN:      [id number]        Account:      [id number]   :      1955           Visit Date:   2018      Document: [id number]       Again, thank you for allowing me to participate in the care of your patient.        Sincerely,        Ness Barillas MD

## 2018-02-14 NOTE — NURSING NOTE
"Oncology Rooming Note    February 14, 2018 2:40 PM   Mago Santiago is a 62 year old female who presents for:    Chief Complaint   Patient presents with     Oncology Clinic Visit     Follow up Breast Cancer     Initial Vitals: BP 97/63  Pulse 71  Temp 97.4  F (36.3  C) (Tympanic)  Resp 16  Ht 1.727 m (5' 8\")  Wt 71.2 kg (157 lb)  SpO2 98%  BMI 23.87 kg/m2 Estimated body mass index is 23.87 kg/(m^2) as calculated from the following:    Height as of this encounter: 1.727 m (5' 8\").    Weight as of this encounter: 71.2 kg (157 lb). Body surface area is 1.85 meters squared.  No Pain (0) Comment: Data Unavailable   No LMP recorded. Patient is postmenopausal.  Allergies reviewed: Yes  Medications reviewed: Yes    Medications: Medication refills not needed today.  Pharmacy name entered into [employer]: [facility] [id number] [place] [address] - [address]  AT [facility]    Clinical concerns: follow up     8 minutes for nursing intake (face to face time)     Cara Stephen CMA     DISCHARGE PLAN:  Next appointments: See patient instruction section  Departure Mode: Ambulatory  Accompanied by: self  0 minutes for nursing discharge (face to face time)   Cara Stephen CMA                  "

## 2018-02-14 NOTE — MR AVS SNAPSHOT
After Visit Summary   2/14/2018    Mago Santiago    MRN: [id number]           Patient Information     Date Of Birth          1955        Visit Information        Provider Department      2/14/2018 2:30 PM Joyce Barillas MD [facility]        Today's Diagnoses     Malignant neoplasm of left breast in female, estrogen receptor positive, unspecified site of breast (H)    -  1      Care Instructions          RTC MD 6 months Scheduled  Marlyn DONALDSON        Labs today  - ljt  AVS given to patient            Follow-ups after your visit        Your next 10 appointments already scheduled     Aug 15, 2018  2:30 PM CDT   Return Visit with Joyce Barillas MD   [facility] ([facility])    [facility] [place]  [address]  [address] [address] [phone]              Who to contact     If you have questions or need follow up information about today's clinic visit or your schedule please contact [facility] directly at [phone].  Normal or non-critical lab and imaging results will be communicated to you by OrthoFihart, letter or phone within 4 business days after the clinic has received the results. If you do not hear from us within 7 days, please contact the clinic through OrthoFihart or phone. If you have a critical or abnormal lab result, we will notify you by phone as soon as possible.  Submit refill requests through Pensqr or call your pharmacy and they will forward the refill request to us. Please allow 3 business days for your refill to be completed.          Additional Information About Your Visit        OrthoFihart Information     Pensqr gives you secure access to your electronic health record. If you see a primary care provider, you can also send messages to your care team and make appointments. If you have questions, please call your primary care clinic.  If you do not  "have a primary care provider, please call [phone] and they will assist you.        Care EveryWhere ID     This is your Care EveryWhere ID. This could be used by other organizations to access your [place] medical records  [id number]        Your Vitals Were     Pulse Temperature Respirations Height Pulse Oximetry BMI (Body Mass Index)    71 97.4  F (36.3  C) (Tympanic) 16 1.727 m (5' 8\") 98% 23.87 kg/m2       Blood Pressure from Last 3 Encounters:   02/14/18 97/63   01/09/18 98/62   09/21/17 102/60    Weight from Last 3 Encounters:   02/14/18 71.2 kg (157 lb)   01/09/18 71 kg (156 lb 9.6 oz)   09/21/17 71.2 kg (157 lb)              We Performed the Following     CBC with platelets     Comprehensive metabolic panel (BMP + Alb, Alk Phos, ALT, AST, Total. Bili, TP)        Primary Care Provider Office Phone # Fax #    Hanny Kunz [phone] [address] [place] [address]        Equal Access to Services     [facility]: Hadii aad ku hadasho Soindu, waaxda luqadaha, qaybta kaalmada catalina, elli mcadams . So [facility] [phone].    ATENCIÓN: Si habla español, tiene a mckeon disposición servicios gratuitos de asistencia lingüística. [facility] [phone].    We comply with applicable federal civil rights laws and [place] laws. We do not discriminate on the basis of race, color, national origin, age, disability, sex, sexual orientation, or gender identity.            Thank you!     Thank you for choosing [facility]  for your care. Our goal is always to provide you with excellent care. Hearing back from our patients is one way we can continue to improve our services. Please take a few minutes to complete the written survey that you may receive in the mail after your visit with us. Thank you!             Your Updated Medication List - Protect others around you: Learn how to safely use, store and throw away your medicines at " www.disposemymeds.org.          This list is accurate as of 2/14/18  3:26 PM.  Always use your most recent med list.                   Brand Name Dispense Instructions for use Diagnosis    anastrozole 1 MG tablet    ARIMIDEX    90 tablet    Take 1 tablet (1 mg) by mouth daily    Malignant neoplasm of left female breast, unspecified site of breast       ASPIRIN PO      Take 81 mg by mouth        FLUoxetine 20 MG capsule    PROzac    90 capsule    Take 1 capsule (20 mg) by mouth daily    Anxiety       multivitamin, therapeutic Tabs tablet      Take 1 tablet by mouth daily        VITAMIN D (CHOLECALCIFEROL) PO      Take 1,000 Units by mouth daily

## 2018-02-15 NOTE — PROGRESS NOTES
Visit Date:   02/14/2018      HISTORY OF PRESENT ILLNESS:  Mago Santiago is 62 years old who comes in today for interim followup.  She is here for followup of her left-sided breast cancer.  She was diagnosed with left-sided breast cancer in 09/2011.  She is over 6 years out from her diagnosis.  She continues on adjuvant Arimidex.  We have decided to follow her with a bone density test, and as long as that stays stable, we are going to continue on the Arimidex for another couple of years.  I have reviewed with her today a recent bone density which was done on 01/25/2018.  She has got some mild osteopenia but overall she has got a good bone density.  She also had a screening mammogram done on 02/06/2018, which I have reviewed with her today and that was normal.  She feels well today.  She is getting ready for shelter in 06/2018.      REVIEW OF SYSTEMS:  A 14-point comprehensive review of systems is otherwise unremarkable today.      MEDICATIONS:  Are as charted.      ALLERGIES:  As charted.      PHYSICAL EXAMINATION:    GENERAL:  She is a well-appearing lady in no acute distress.   VITAL SIGNS:  Stable.   HEENT:  Oropharynx clear, mucous membranes moist.   NECK:  Has no masses or goiter there.  There is no cervical, supraclavicular or infraclavicular adenopathy.   CHEST:  Clear to auscultation and percussion bilaterally.   HEART:  S1, S2 normal.  No added sounds, no murmurs.   ABDOMEN:  Soft and nontender, no hepatosplenomegaly.   SKIN:  Has no rashes or lesions.     LYMPHATICS:  No evidence of lymphedema.   BREASTS:  Patient is status post left-sided lumpectomy.  The scar looks good.  No further palpable masses.  Right breast normal; no palpable masses.  Right and left axilla negative.      DATA REVIEW:  Labs are pending at time of dictation.      IMPRESSION:  A 62-year-old patient with a history of left-sided breast cancer diagnosed in 2011.  In 2018, she will be 7 years out from her diagnosis.  She is currently  on Arimidex.  We are going to continue on the Arimidex as long as her bone density tolerates it.  I will see her back in my clinic in 6 months.  I have ordered routine blood work today.         NESS MARCELINO MD             D: 02/15/2018   T: 02/15/2018   MT: CHRIS      Name:     DONATO FLORES   MRN:      [id number]        Account:      [id number]   :      1955           Visit Date:   2018      Document: [id number]

## 2018-02-21 DIAGNOSIS — C50.112 MALIGNANT NEOPLASM OF CENTRAL PORTION OF LEFT FEMALE BREAST, UNSPECIFIED ESTROGEN RECEPTOR STATUS (H): Primary | ICD-10-CM

## 2018-02-21 RX ORDER — ANASTROZOLE 1 MG/1
1 TABLET ORAL DAILY
Qty: 90 TABLET | Refills: 3 | Status: SHIPPED | OUTPATIENT
Start: 2018-02-21 | End: 2019-02-18

## 2018-02-21 RX ORDER — ANASTROZOLE 1 MG/1
1 TABLET ORAL DAILY
Qty: 90 TABLET | Refills: 3 | Status: CANCELLED | OUTPATIENT
Start: 2018-02-21

## 2018-02-21 NOTE — TELEPHONE ENCOUNTER
Pending Prescriptions:                       Disp   Refills    anastrozole (ARIMIDEX) 1 MG tablet        90 tab*3            Sig: Take 1 tablet (1 mg) by mouth daily          Last Written Prescription Date:  1/26/2017  Last Fill Quantity: 90,   # refills: 3  Last Office Visit: 2/14/2018  Future Office visit:   8/15/2018    Routing refill request to provider for review/approval.  Katerin Adrian RN BSN

## 2018-06-07 ENCOUNTER — TRANSFERRED RECORDS (OUTPATIENT)
Dept: HEALTH INFORMATION MANAGEMENT | Facility: CLINIC | Age: 63
End: 2018-06-07

## 2018-09-20 ENCOUNTER — ONCOLOGY VISIT (OUTPATIENT)
Dept: ONCOLOGY | Facility: CLINIC | Age: 63
End: 2018-09-20
Attending: INTERNAL MEDICINE
Payer: COMMERCIAL

## 2018-09-20 VITALS
TEMPERATURE: 97.7 F | DIASTOLIC BLOOD PRESSURE: 66 MMHG | OXYGEN SATURATION: 99 % | HEART RATE: 66 BPM | RESPIRATION RATE: 16 BRPM | WEIGHT: 156 LBS | BODY MASS INDEX: 23.64 KG/M2 | HEIGHT: 68 IN | SYSTOLIC BLOOD PRESSURE: 106 MMHG

## 2018-09-20 DIAGNOSIS — Z17.0 MALIGNANT NEOPLASM OF BREAST IN FEMALE, ESTROGEN RECEPTOR POSITIVE, UNSPECIFIED LATERALITY, UNSPECIFIED SITE OF BREAST (H): Primary | ICD-10-CM

## 2018-09-20 DIAGNOSIS — C50.919 MALIGNANT NEOPLASM OF BREAST IN FEMALE, ESTROGEN RECEPTOR POSITIVE, UNSPECIFIED LATERALITY, UNSPECIFIED SITE OF BREAST (H): Primary | ICD-10-CM

## 2018-09-20 PROCEDURE — G0463 HOSPITAL OUTPT CLINIC VISIT: HCPCS

## 2018-09-20 PROCEDURE — 99213 OFFICE O/P EST LOW 20 MIN: CPT | Performed by: INTERNAL MEDICINE

## 2018-09-20 ASSESSMENT — PAIN SCALES - GENERAL: PAINLEVEL: NO PAIN (0)

## 2018-09-20 NOTE — PATIENT INSTRUCTIONS
RTC MD   6-9 months        Labs on return     Mammo   Feb 2019     Forwarded to remind me. Marlyn DONALDSON

## 2018-09-20 NOTE — NURSING NOTE
"Oncology Rooming Note    September 20, 2018 9:02 AM   Mago Santiago is a 63 year old female who presents for:    Chief Complaint   Patient presents with     Oncology Clinic Visit     Malignant neoplasm of central portion of left female breast      Initial Vitals: /66  Pulse 66  Temp 97.7  F (36.5  C) (Tympanic)  Resp 16  Ht 1.727 m (5' 8\")  Wt 70.8 kg (156 lb)  SpO2 99%  BMI 23.72 kg/m2 Estimated body mass index is 23.72 kg/(m^2) as calculated from the following:    Height as of this encounter: 1.727 m (5' 8\").    Weight as of this encounter: 70.8 kg (156 lb). Body surface area is 1.84 meters squared.  No Pain (0) Comment: Data Unavailable   No LMP recorded. Patient is postmenopausal.  Allergies reviewed: Yes  Medications reviewed: Yes    Medications: Medication refills not needed today.  Pharmacy name entered into [employer]: [facility] [id number] [place], [place] - [address]  AT [facility]    Clinical concerns: follow up     0 minutes for nursing intake (face to face time)     Cara Stephen CMA   DISCHARGE PLAN:  Next appointments: See patient instruction section  Departure Mode: Ambulatory  Accompanied by: self  0 minutes for nursing discharge (face to face time)   Cara Stephen CMA                  "

## 2018-09-20 NOTE — PROGRESS NOTES
Visit Date:   2018      HISTORY OF PRESENT ILLNESS:  Mago is a 63-year-old patient who comes in today for interim followup.  She is here for followup of left-sided breast cancer.  She also was diagnosed with left-sided breast cancer in 2011.  She is now 7 years out from her diagnosis.  She is continuing on adjuvant Arimidex and we are following her bone density.  Her lungs appear stable.  We are going to continue the Arimidex for another couple of years.  She has had stable bone density as of 2018.  She has just got some mild osteopenia.        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  She hikes and also cycles quite a bit and does a lot of traveling.  She is up-to-date on a mammogram, which was done in 2018.  She feels well today.  She denies cough, shortness of breath, bone pain, any worrisome symptomatology.      REVIEW OF SYSTEMS:  A 14-point comprehensive review of systems otherwise unremarkable.      MEDICATIONS:  As charted.      ALLERGIES:  AS CHARTED.      PHYSICAL EXAMINATION:   GENERAL:  On physical exam, she is a well-appearing lady in no acute distress.   VITAL SIGNS:  Stable.     See the standard physical exam of 2018 as it has not changed.      DATA REVIEW:  I have reviewed labs from 2018.  We do not need to do labs until the next visit.      IMPRESSION:  A 63-year-old patient with a history of left-sided breast cancer that was diagnosed in 2011.  She is approaching 7 years out from her diagnosis.  She is continuing on active treatment with adjuvant Arimidex.  The Arimidex is going well and she is tolerating it well.  I will see her now in 6-9 months.  She is to call me if there are any problems.  I wrote an order today for a mammogram for 2019.         NESS MARCELINO MD             D: 2018   T: 2018   MT: MARILIN      Name:     MAGO FLORES   MRN:      [id number]        Account:      [id number]   :      1955            Visit Date:   09/20/2018      Document: [id number]

## 2018-10-02 DIAGNOSIS — F41.9 ANXIETY: ICD-10-CM

## 2018-10-02 NOTE — TELEPHONE ENCOUNTER
"Requested Prescriptions   Pending Prescriptions Disp Refills     FLUoxetine (PROZAC) 20 MG capsule [Pharmacy Med Name: FLUOXETINE 20MG CAPSULES]    Last Written Prescription Date:  2/21/2017  Last Fill Quantity: 90,  # refills: 3   Last office visit: 9/21/2017 with prescribing provider:  Hanny Kunz     Future Office Visit:   Next 5 appointments (look out 90 days)     Oct 08, 2018  8:10 AM CDT   PHYSICAL with Hanny Kunz MD   [facility]    [address] [place] [address]   [phone]                  90 capsule 0     Sig: TAKE 1 CAPSULE(20 MG) BY MOUTH DAILY    SSRIs Protocol Passed    10/2/2018  3:35 AM       Passed - Recent (12 mo) or future (30 days) visit within the authorizing provider's specialty    Patient had office visit in the last 12 months or has a visit in the next 30 days with authorizing provider or within the authorizing provider's specialty.  See \"Patient Info\" tab in inbasket, or \"Choose Columns\" in Meds & Orders section of the refill encounter.           Passed - Patient is age 18 or older       Passed - No active pregnancy on record       Passed - No positive pregnancy test in last 12 months          "

## 2018-10-04 NOTE — TELEPHONE ENCOUNTER
Medication is being filled for 1 time refill only due to:  Pt over due for an office visit.    Gayla Hirsch RN

## 2018-10-07 ASSESSMENT — ENCOUNTER SYMPTOMS
ABDOMINAL PAIN: 0
COUGH: 0
SHORTNESS OF BREATH: 0
SORE THROAT: 0
FREQUENCY: 0
DIZZINESS: 0
CONSTIPATION: 0
HEARTBURN: 0
DIARRHEA: 0
FEVER: 0
PARESTHESIAS: 0
EYE PAIN: 0
MYALGIAS: 0
NAUSEA: 0
NERVOUS/ANXIOUS: 0
HEMATURIA: 0
ARTHRALGIAS: 0
DYSURIA: 0
HEMATOCHEZIA: 0
JOINT SWELLING: 0
PALPITATIONS: 0
CHILLS: 0
HEADACHES: 0
WEAKNESS: 0
BREAST MASS: 0

## 2018-10-08 ENCOUNTER — OFFICE VISIT (OUTPATIENT)
Dept: PEDIATRICS | Facility: CLINIC | Age: 63
End: 2018-10-08
Payer: COMMERCIAL

## 2018-10-08 VITALS
WEIGHT: 155 LBS | TEMPERATURE: 97.2 F | HEART RATE: 61 BPM | DIASTOLIC BLOOD PRESSURE: 60 MMHG | HEIGHT: 68 IN | OXYGEN SATURATION: 99 % | SYSTOLIC BLOOD PRESSURE: 100 MMHG | BODY MASS INDEX: 23.49 KG/M2

## 2018-10-08 DIAGNOSIS — Z11.4 SCREENING FOR HUMAN IMMUNODEFICIENCY VIRUS: ICD-10-CM

## 2018-10-08 DIAGNOSIS — Z23 NEED FOR PROPHYLACTIC VACCINATION AND INOCULATION AGAINST INFLUENZA: ICD-10-CM

## 2018-10-08 DIAGNOSIS — Z71.84 TRAVEL ADVICE ENCOUNTER: ICD-10-CM

## 2018-10-08 DIAGNOSIS — Z00.00 WELL WOMAN EXAM WITHOUT GYNECOLOGICAL EXAM: Primary | ICD-10-CM

## 2018-10-08 DIAGNOSIS — C50.112 MALIGNANT NEOPLASM OF CENTRAL PORTION OF LEFT FEMALE BREAST, UNSPECIFIED ESTROGEN RECEPTOR STATUS (H): ICD-10-CM

## 2018-10-08 DIAGNOSIS — Z11.59 NEED FOR HEPATITIS C SCREENING TEST: ICD-10-CM

## 2018-10-08 DIAGNOSIS — F41.9 ANXIETY: ICD-10-CM

## 2018-10-08 LAB
ANION GAP SERPL CALCULATED.3IONS-SCNC: 7 MMOL/L (ref 3–14)
BUN SERPL-MCNC: 9 MG/DL (ref 7–30)
CALCIUM SERPL-MCNC: 9.2 MG/DL (ref 8.5–10.1)
CHLORIDE SERPL-SCNC: 105 MMOL/L (ref 94–109)
CHOLEST SERPL-MCNC: 145 MG/DL
CO2 SERPL-SCNC: 28 MMOL/L (ref 20–32)
CREAT SERPL-MCNC: 0.94 MG/DL (ref 0.52–1.04)
GFR SERPL CREATININE-BSD FRML MDRD: 60 ML/MIN/1.7M2
GLUCOSE SERPL-MCNC: 80 MG/DL (ref 70–99)
HCV AB SERPL QL IA: NONREACTIVE
HDLC SERPL-MCNC: 64 MG/DL
HIV 1+2 AB+HIV1 P24 AG SERPL QL IA: NONREACTIVE
LDLC SERPL CALC-MCNC: 64 MG/DL
NONHDLC SERPL-MCNC: 81 MG/DL
POTASSIUM SERPL-SCNC: 4.3 MMOL/L (ref 3.4–5.3)
SODIUM SERPL-SCNC: 140 MMOL/L (ref 133–144)
TRIGL SERPL-MCNC: 87 MG/DL

## 2018-10-08 PROCEDURE — 99396 PREV VISIT EST AGE 40-64: CPT | Mod: 25 | Performed by: INTERNAL MEDICINE

## 2018-10-08 PROCEDURE — 87389 HIV-1 AG W/HIV-1&-2 AB AG IA: CPT | Performed by: INTERNAL MEDICINE

## 2018-10-08 PROCEDURE — 90686 IIV4 VACC NO PRSV 0.5 ML IM: CPT | Performed by: INTERNAL MEDICINE

## 2018-10-08 PROCEDURE — 86803 HEPATITIS C AB TEST: CPT | Performed by: INTERNAL MEDICINE

## 2018-10-08 PROCEDURE — 90471 IMMUNIZATION ADMIN: CPT | Performed by: INTERNAL MEDICINE

## 2018-10-08 PROCEDURE — 80061 LIPID PANEL: CPT | Performed by: INTERNAL MEDICINE

## 2018-10-08 PROCEDURE — 36415 COLL VENOUS BLD VENIPUNCTURE: CPT | Performed by: INTERNAL MEDICINE

## 2018-10-08 PROCEDURE — 80048 BASIC METABOLIC PNL TOTAL CA: CPT | Performed by: INTERNAL MEDICINE

## 2018-10-08 RX ORDER — CIPROFLOXACIN 500 MG/1
500 TABLET, FILM COATED ORAL 2 TIMES DAILY
Qty: 6 TABLET | Refills: 0 | Status: SHIPPED | OUTPATIENT
Start: 2018-10-08 | End: 2019-04-11

## 2018-10-08 RX ORDER — ACETAZOLAMIDE 250 MG/1
500 TABLET ORAL 2 TIMES DAILY
Qty: 72 TABLET | Refills: 0 | Status: SHIPPED | OUTPATIENT
Start: 2018-10-08 | End: 2018-10-23

## 2018-10-08 ASSESSMENT — ENCOUNTER SYMPTOMS
DYSURIA: 0
DIZZINESS: 0
WEAKNESS: 0
EYE PAIN: 0
BREAST MASS: 0
HEMATOCHEZIA: 0
SHORTNESS OF BREATH: 0
FEVER: 0
HEADACHES: 0
MYALGIAS: 0
ARTHRALGIAS: 0
JOINT SWELLING: 0
PALPITATIONS: 0
HEARTBURN: 0
DIARRHEA: 0
PARESTHESIAS: 0
CHILLS: 0
COUGH: 0
ABDOMINAL PAIN: 0
FREQUENCY: 0
CONSTIPATION: 0
NERVOUS/ANXIOUS: 0
HEMATURIA: 0
NAUSEA: 0
SORE THROAT: 0

## 2018-10-08 NOTE — MR AVS SNAPSHOT
After Visit Summary   10/8/2018    Mago Santiago    MRN: [id number]           Patient Information     Date Of Birth          1955        Visit Information        Provider Department      10/8/2018 8:10 AM Hanny Kunz MD [facility]        Today's Diagnoses     Well woman exam without gynecological exam    -  1    Travel advice encounter        Need for prophylactic vaccination and inoculation against influenza        Anxiety        Need for hepatitis C screening test        Screening for human immunodeficiency virus          Care Instructions    Medications sent for trip to [place]: Diamox, Cipro, and typhoid vaccine.    Follow-up with travel clinic for yellow fever vaccine.     Fasting labs today.     Preventive Health Recommendations  Female Ages 50 - 64    Yearly exam: See your health care provider every year in order to  o Review health changes.   o Discuss preventive care.    o Review your medicines if your doctor has prescribed any.      Get a Pap test every three years (unless you have an abnormal result and your provider advises testing more often).    If you get Pap tests with HPV test, you only need to test every 5 years, unless you have an abnormal result.     You do not need a Pap test if your uterus was removed (hysterectomy) and you have not had cancer.    You should be tested each year for STDs (sexually transmitted diseases) if you're at risk.     Have a mammogram every 1 to 2 years.    Have a colonoscopy at age 50, or have a yearly FIT test (stool test). These exams screen for colon cancer.      Have a cholesterol test every 5 years, or more often if advised.    Have a diabetes test (fasting glucose) every three years. If you are at risk for diabetes, you should have this test more often.     If you are at risk for osteoporosis (brittle bone disease), think about having a bone density scan (DEXA).    Shots: Get a flu shot each year. Get a tetanus shot every 10  years.    Nutrition:     Eat at least 5 servings of fruits and vegetables each day.    Eat whole-grain bread, whole-wheat pasta and brown rice instead of white grains and rice.    Get adequate Calcium and Vitamin D.     Lifestyle    Exercise at least 150 minutes a week (30 minutes a day, 5 days a week). This will help you control your weight and prevent disease.    Limit alcohol to one drink per day.    No smoking.     Wear sunscreen to prevent skin cancer.     See your dentist every six months for an exam and cleaning.    See your eye doctor every 1 to 2 years.            Follow-ups after your visit        Who to contact     If you have questions or need follow up information about today's clinic visit or your schedule please contact [facility] directly at [phone].  Normal or non-critical lab and imaging results will be communicated to you by MyChart, letter or phone within 4 business days after the clinic has received the results. If you do not hear from us within 7 days, please contact the clinic through [employer] or phone. If you have a critical or abnormal lab result, we will notify you by phone as soon as possible.  Submit refill requests through [employer] or call your pharmacy and they will forward the refill request to us. Please allow 3 business days for your refill to be completed.          Additional Information About Your Visit        [employer] Information     [employer] gives you secure access to your electronic health record. If you see a primary care provider, you can also send messages to your care team and make appointments. If you have questions, please call your primary care clinic.  If you do not have a primary care provider, please call [phone] and they will assist you.        Care EveryWhere ID     This is your Care EveryWhere ID. This could be used by other organizations to access your [place] medical records  [id number]        Your Vitals Were     Pulse Temperature Height  "Pulse Oximetry BMI (Body Mass Index)       61 97.2  F (36.2  C) (Tympanic) 5' 8\" (1.727 m) 99% 23.57 kg/m2        Blood Pressure from Last 3 Encounters:   10/08/18 100/60   09/20/18 106/66   02/14/18 97/63    Weight from Last 3 Encounters:   10/08/18 155 lb (70.3 kg)   09/20/18 156 lb (70.8 kg)   02/14/18 157 lb (71.2 kg)              We Performed the Following     Basic metabolic panel  (Ca, Cl, CO2, Creat, Gluc, K, Na, BUN)     FLU VACCINE, SPLIT VIRUS, IM (QUADRIVALENT) [78241]- >3 YRS     Hepatitis C Screen Reflex to HCV RNA Quant and Genotype     HIV Antigen Antibody Combo     Lipid panel reflex to direct LDL Fasting     Vaccine Administration, Initial [64221]          Today's Medication Changes          These changes are accurate as of 10/8/18  8:41 AM.  If you have any questions, ask your nurse or doctor.               Start taking these medicines.        Dose/Directions    acetaZOLAMIDE 250 MG tablet   Commonly known as:  DIAMOX   Used for:  Travel advice encounter   Started by:  Hanny Kunz MD        Dose:  500 mg   Take 2 tablets (500 mg) by mouth 2 times daily for 18 days Start 2 days before ascent, take while at high altitude, and continue for 2 days after descent.   Quantity:  72 tablet   Refills:  0       ciprofloxacin 500 MG tablet   Commonly known as:  CIPRO   Used for:  Travel advice encounter   Started by:  Hanny Kunz MD        Dose:  500 mg   Take 1 tablet (500 mg) by mouth 2 times daily   Quantity:  6 tablet   Refills:  0       typhoid CR capsule   Commonly known as:  VIVOTIF   Used for:  Travel advice encounter   Started by:  Hanny Kunz MD        Dose:  1 capsule   Take 1 capsule by mouth every other day   Quantity:  4 capsule   Refills:  0            Where to get your medicines      These medications were sent to [id number] - [place] - [address]  AT [address]  [address] [place] [address]     Phone:  [phone]     " ciprofloxacin 500 MG tablet    FLUoxetine 20 MG capsule    typhoid CR capsule         Some of these will need a paper prescription and others can be bought over the counter.  Ask your nurse if you have questions.     Bring a paper prescription for each of these medications     acetaZOLAMIDE 250 MG tablet                Primary Care Provider Office Phone # Fax #    Hanny Kunz [phone] [address] [place] [address]        Equal Access to Services     : Hadii aad ku hadasho Soomaali, waaxda luqadaha, qaybta kaalmada adeegyada, waxay idiin hayaan adeeg khtoddsh lajohanny . So [facility] [phone].    ATENCIÓN: Si habla espmckenzie, tiene a mckeon disposición servicios gratuitos de asistencia lingüística. Llame al [phone].    We comply with applicable federal civil rights laws and [place] laws. We do not discriminate on the basis of race, color, national origin, age, disability, sex, sexual orientation, or gender identity.            Thank you!     Thank you for choosing [facility]  for your care. Our goal is always to provide you with excellent care. Hearing back from our patients is one way we can continue to improve our services. Please take a few minutes to complete the written survey that you may receive in the mail after your visit with us. Thank you!             Your Updated Medication List - Protect others around you: Learn how to safely use, store and throw away your medicines at www.disposemymeds.org.          This list is accurate as of 10/8/18  8:41 AM.  Always use your most recent med list.                   Brand Name Dispense Instructions for use Diagnosis    acetaZOLAMIDE 250 MG tablet    DIAMOX    72 tablet    Take 2 tablets (500 mg) by mouth 2 times daily for 18 days Start 2 days before ascent, take while at high altitude, and continue for 2 days after descent.    Travel advice encounter       anastrozole 1 MG tablet    ARIMIDEX    90  tablet    Take 1 tablet (1 mg) by mouth daily    Malignant neoplasm of central portion of left female breast, unspecified estrogen receptor status (H)       ASPIRIN PO      Take 81 mg by mouth        ciprofloxacin 500 MG tablet    CIPRO    6 tablet    Take 1 tablet (500 mg) by mouth 2 times daily    Travel advice encounter       FLUoxetine 20 MG capsule    PROzac    90 capsule    TAKE 1 CAPSULE(20 MG) BY MOUTH DAILY    Anxiety       multivitamin, therapeutic Tabs tablet      Take 1 tablet by mouth daily        typhoid CR capsule    VIVOTIF    4 capsule    Take 1 capsule by mouth every other day    Travel advice encounter       VITAMIN D (CHOLECALCIFEROL) PO      Take 1,000 Units by mouth daily

## 2018-10-08 NOTE — PROGRESS NOTES
SUBJECTIVE:   CC: Mago Santiago is an 63 year old woman who presents for preventive health visit.     Physical   Annual:     Getting at least 3 servings of Calcium per day:  Yes    Bi-annual eye exam:  Yes    Dental care twice a year:  Yes    Sleep apnea or symptoms of sleep apnea:  None    Diet:  Gluten-free/reduced    Frequency of exercise:  6-7 days/week    Duration of exercise:  Greater than 60 minutes    Taking medications regularly:  Yes    Medication side effects:  None    Additional concerns today:  YES    Concerns:  1. Traveling to [place] this fall for a hiking trip. For parts of this will be at high altitudes >17320eq. Wondering about travel medications and acetazolamide.     2. Anxiety: Well controlled, thinking about stopping fluoxetine. Recently retired which is going very well.     Today's PHQ-2 Score:   PHQ-2 ( 1999 Pfizer) 10/7/2018   Q1: Little interest or pleasure in doing things 0   Q2: Feeling down, depressed or hopeless 0   PHQ-2 Score 0   Q1: Little interest or pleasure in doing things Not at all   Q2: Feeling down, depressed or hopeless Not at all   PHQ-2 Score 0       Abuse: Current or Past(Physical, Sexual or Emotional)- No  Do you feel safe in your environment - Yes    Social History   Substance Use Topics     Smoking status: Never Smoker     Smokeless tobacco: Never Used     Alcohol use 0.0 oz/week     0 Standard drinks or equivalent per week      Comment: Seldom     Alcohol Use 10/7/2018   If you drink alcohol do you typically have greater than 3 drinks per day OR greater than 7 drinks per week? No       Reviewed orders with patient.  Reviewed health maintenance and updated orders accordingly - Yes  Patient Active Problem List   Diagnosis     Advanced directives, counseling/discussion     Malignant neoplasm of central portion of left female breast (HCC)     Family history of multiple sclerosis     Anxiety     Celiac disease     Past Surgical History:   Procedure Laterality Date     ENT  SURGERY      wisdom teeth     LASIK  1995    right eye     LUMPECTOMY BREAST  2011    left ; breast cancer        Social History   Substance Use Topics     Smoking status: Never Smoker     Smokeless tobacco: Never Used     Alcohol use 0.0 oz/week     0 Standard drinks or equivalent per week      Comment: Seldom     Family History   Problem Relation Age of Onset     Genetic Disorder Mother      MS     Prostate Cancer Father      Genetic Disorder Sister      MS     Diabetes Daughter      Type 1           Alternate mammogram schedule due to breast cancer history, managed by Oncology.     Pertinent mammograms are reviewed under the imaging tab.  History of abnormal Pap smear: NO - age 30-65 PAP every 5 years with negative HPV co-testing recommended  PAP / HPV Latest Ref Rng & Units 9/9/2016   PAP - NIL   HPV 16 DNA NEG Negative   HPV 18 DNA NEG Negative   OTHER HR HPV NEG Negative     Reviewed and updated as needed this visit by clinical staff         Reviewed and updated as needed this visit by Provider            Review of Systems   Constitutional: Negative for chills and fever.   HENT: Negative for congestion, ear pain, hearing loss and sore throat.    Eyes: Negative for pain and visual disturbance.   Respiratory: Negative for cough and shortness of breath.    Cardiovascular: Negative for chest pain, palpitations and peripheral edema.   Gastrointestinal: Negative for abdominal pain, constipation, diarrhea, heartburn, hematochezia and nausea.   Breasts:  Negative for tenderness, breast mass and discharge.   Genitourinary: Negative for dysuria, frequency, genital sores, hematuria, pelvic pain, urgency, vaginal bleeding and vaginal discharge.   Musculoskeletal: Negative for arthralgias, joint swelling and myalgias.   Skin: Negative for rash.   Neurological: Negative for dizziness, weakness, headaches and paresthesias.   Psychiatric/Behavioral: Negative for mood changes. The patient is not nervous/anxious.          "  OBJECTIVE:   /60 (BP Location: Right arm, Patient Position: Chair, Cuff Size: Adult Large)  Pulse 61  Temp 97.2  F (36.2  C) (Tympanic)  Ht 5' 8\" (1.727 m)  Wt 155 lb (70.3 kg)  SpO2 99%  BMI 23.57 kg/m2  Physical Exam  GENERAL: healthy, alert and no distress  EYES: Eyes grossly normal to inspection, PERRL and conjunctivae and sclerae normal  HENT: ear canals and TM's normal, nose and mouth without ulcers or lesions  NECK: no adenopathy, no asymmetry, masses, or scars and thyroid normal to palpation  RESP: lungs clear to auscultation - no rales, rhonchi or wheezes  BREAST: deferred, just examined by Oncologist  CV: regular rate and rhythm, normal S1 S2, no S3 or S4, no murmur, click or rub, no peripheral edema and peripheral pulses strong  ABDOMEN: soft, nontender, no hepatosplenomegaly, no masses and bowel sounds normal  MS: no gross musculoskeletal defects noted, no edema  SKIN: no suspicious lesions or rashes  NEURO: Normal strength and tone, mentation intact and speech normal  PSYCH: mentation appears normal, affect normal/bright      ASSESSMENT/PLAN:   1. Well woman exam without gynecological exam  - Lipid panel reflex to direct LDL Fasting  - Basic metabolic panel  (Ca, Cl, CO2, Creat, Gluc, K, Na, BUN)    2. Travel advice encounter  Will give traveler's diarrhea medication, oral typhoid vaccine, and acetazolamide to help with altitude sickness. Encouraged patient to schedule travel appointment for yellow fever vaccine.   - typhoid (VIVOTIF) CR capsule; Take 1 capsule by mouth every other day  Dispense: 4 capsule; Refill: 0  - acetaZOLAMIDE (DIAMOX) 250 MG tablet; Take 2 tablets (500 mg) by mouth 2 times daily for 18 days Start 2 days before ascent, take while at high altitude, and continue for 2 days after descent.  Dispense: 72 tablet; Refill: 0  - ciprofloxacin (CIPRO) 500 MG tablet; Take 1 tablet (500 mg) by mouth 2 times daily  Dispense: 6 tablet; Refill: 0    3. Breast cancer  Managed by " "Onc, on Arimidex. Gets DEXAs, mammos through Oncology.     4. Need for prophylactic vaccination and inoculation against influenza  - FLU VACCINE, SPLIT VIRUS, IM (QUADRIVALENT) [91318]- >3 YRS  - Vaccine Administration, Initial [76094]    5. Anxiety  Well controlled, discussed that she could try stopping fluoxetine if she would like.   - FLUoxetine (PROZAC) 20 MG capsule; TAKE 1 CAPSULE(20 MG) BY MOUTH DAILY  Dispense: 90 capsule; Refill: 3    6. Need for hepatitis C screening test  - Hepatitis C Screen Reflex to HCV RNA Quant and Genotype    7. Screening for human immunodeficiency virus  - HIV Antigen Antibody Combo    COUNSELING:  Reviewed preventive health counseling, as reflected in patient instructions  Special attention given to:        Regular exercise       Healthy diet/nutrition       Vision screening       Hearing screening       Consider Hep C screening for patients born between 1945 and 1965       HIV screeninx in teen years, 1x in adult years, and at intervals if high risk    BP Readings from Last 1 Encounters:   18 106/66     Estimated body mass index is 23.72 kg/(m^2) as calculated from the following:    Height as of 18: 5' 8\" (1.727 m).    Weight as of 18: 156 lb (70.8 kg).           reports that she has never smoked. She has never used smokeless tobacco.      Counseling Resources:  ATP IV Guidelines  Pooled Cohorts Equation Calculator  Breast Cancer Risk Calculator  FRAX Risk Assessment  ICSI Preventive Guidelines  Dietary Guidelines for Americans,   USDA's MyPlate  ASA Prophylaxis  Lung CA Screening    Hanny Nava MD  [facility]  Answers for HPI/ROS submitted by the patient on 10/7/2018   PHQ-2 Score: 0      Injectable Influenza Immunization Documentation    1.  Is the person to be vaccinated sick today?   No    2. Does the person to be vaccinated have an allergy to a component   of the vaccine?   No  Egg Allergy Algorithm Link    3. Has the person to be " vaccinated ever had a serious reaction   to influenza vaccine in the past?   No    4. Has the person to be vaccinated ever had Guillain-Barré syndrome?   No    Form completed by Milana Bhatia MA 10/8/2018 8:05 AM

## 2018-10-08 NOTE — PATIENT INSTRUCTIONS
Medications sent for trip to [place]: Diamox, Cipro, and typhoid vaccine.    Follow-up with travel clinic for yellow fever vaccine.     Fasting labs today.     Preventive Health Recommendations  Female Ages 50 - 64    Yearly exam: See your health care provider every year in order to  o Review health changes.   o Discuss preventive care.    o Review your medicines if your doctor has prescribed any.      Get a Pap test every three years (unless you have an abnormal result and your provider advises testing more often).    If you get Pap tests with HPV test, you only need to test every 5 years, unless you have an abnormal result.     You do not need a Pap test if your uterus was removed (hysterectomy) and you have not had cancer.    You should be tested each year for STDs (sexually transmitted diseases) if you're at risk.     Have a mammogram every 1 to 2 years.    Have a colonoscopy at age 50, or have a yearly FIT test (stool test). These exams screen for colon cancer.      Have a cholesterol test every 5 years, or more often if advised.    Have a diabetes test (fasting glucose) every three years. If you are at risk for diabetes, you should have this test more often.     If you are at risk for osteoporosis (brittle bone disease), think about having a bone density scan (DEXA).    Shots: Get a flu shot each year. Get a tetanus shot every 10 years.    Nutrition:     Eat at least 5 servings of fruits and vegetables each day.    Eat whole-grain bread, whole-wheat pasta and brown rice instead of white grains and rice.    Get adequate Calcium and Vitamin D.     Lifestyle    Exercise at least 150 minutes a week (30 minutes a day, 5 days a week). This will help you control your weight and prevent disease.    Limit alcohol to one drink per day.    No smoking.     Wear sunscreen to prevent skin cancer.     See your dentist every six months for an exam and cleaning.    See your eye doctor every 1 to 2 years.

## 2018-10-15 ENCOUNTER — TELEPHONE (OUTPATIENT)
Dept: PEDIATRICS | Facility: CLINIC | Age: 63
End: 2018-10-15

## 2018-10-23 ENCOUNTER — OFFICE VISIT (OUTPATIENT)
Dept: FAMILY MEDICINE | Facility: CLINIC | Age: 63
End: 2018-10-23
Payer: COMMERCIAL

## 2018-10-23 VITALS — TEMPERATURE: 99 F | DIASTOLIC BLOOD PRESSURE: 68 MMHG | SYSTOLIC BLOOD PRESSURE: 104 MMHG

## 2018-10-23 DIAGNOSIS — Z71.84 COUNSELING FOR TRAVEL: Primary | ICD-10-CM

## 2018-10-23 DIAGNOSIS — Z23 VACCINE FOR SINGLE BACTERIAL DISEASE: ICD-10-CM

## 2018-10-23 PROCEDURE — 90717 YELLOW FEVER VACCINE SUBQ: CPT | Performed by: FAMILY MEDICINE

## 2018-10-23 PROCEDURE — 99402 PREV MED CNSL INDIV APPRX 30: CPT | Mod: 25 | Performed by: FAMILY MEDICINE

## 2018-10-23 PROCEDURE — 90471 IMMUNIZATION ADMIN: CPT | Performed by: FAMILY MEDICINE

## 2018-10-23 RX ORDER — AZITHROMYCIN 500 MG/1
TABLET, FILM COATED ORAL
Qty: 3 TABLET | Refills: 0 | Status: SHIPPED | OUTPATIENT
Start: 2018-10-23 | End: 2019-04-11

## 2018-10-23 RX ORDER — ACETAZOLAMIDE 250 MG/1
250 TABLET ORAL 2 TIMES DAILY
Qty: 72 TABLET | Refills: 0 | COMMUNITY
Start: 2018-10-23 | End: 2019-04-11

## 2018-10-23 NOTE — PATIENT INSTRUCTIONS
Today October 23, 2018 you received the    Yellow Fever (YF)  .    These appointments can be made as a NURSE ONLY visit.    **It is very important for the vaccinations to be given on the scheduled day(s), this helps ensure you receive the full effectiveness of the vaccine.**    Please call [facility] [place] with any questions [phone]    Thank you for visiting [facility]

## 2018-10-23 NOTE — PROGRESS NOTES
Itinerary:  [place]    Departure Date: 11/01/2018    Return Date: 11/19/2018    Length of Trip: 18 days    Purpose of Trip: pleasure    Urban/Rural: both    Accommodations: hotels, lodges    IMMUNIZATION HISTORY  Have you received any immunizations within the past 4 weeks?  Yes  Have you ever fainted from having your blood drawn or from an injection?  No  Have you ever had a fever reaction to vaccination?  No  Have you ever had any bad reaction or side effect from any vaccination?  No  Have you ever had hepatitis A or B vaccine?  Yes  Do you live (or work closely) with anyone who has AIDS, an AIDS-like condition, any other immune disorder or who is on chemotherapy for cancer or a   family history of immunodeficiency?  No  Have you received any injection of immune globulin or any blood products during the past 12 months?  No    Patient roomed by Kiera Walker CMA      Special medical concerns: none    /68  Temp 99  F (37.2  C) (Tympanic)  EXAM: deferred    Immunizations discussed include: Yellow fever  Has Rx for the typhoid vaccine oral - will start today  Malaraia prophylaxis recommended: insect precautions only  Symptomatic treatment for traveler's diarrhea: azithromycin    Stamaril Informed Consent    The patient was provided with a copy of the IRB-approved consent form and all questions were answered before the patient agreed to participate by signing the informed consent document.   A copy of the form was provided to the patient.    Date: 10/23/18  Consent Version Date: 5/10/17  Consent Obtained by:  Monse Soriano DO  HIPAA:  Yes  HIPAA Authorization Signed Date: 10/23/18      Inclusion/Exclusion Criteria:    (Similar to Yellow Fever-VAX)      The patient met all of the following inclusion criteria in order to be eligible for the Stamaril vaccination under this EAP (Expanded Access Investigational New Drug Program)           At increased risk for YF, including researchers, laboratory workers, vaccine  production staff, and those who are traveling within 30 days to a YF-endemic region or to a country requiring proof of YF vaccination under IHRs (International Health Regulations)?       Yes     Patient is greater than or equal to 9 months of age on the day of vaccination?     Yes     Patient is greater than or equal to 18 years of age and signed and dated the Consent Forms?     Yes     Patient is < 18 years of age and parent(s)/guardian(s) signed and dated the Consent Forms?      Patient is 7 years to < 18 years of age and signed and dated the Assent form?        No Assent is required.  Patient is <7 years of age.     No      No      N/A     The patient did not meet any of the following criteria that would have excluded the patient from receiving the Stamaril vaccination under this EAP              Patient is less than 9 months of age.       No     The patient is breast-feeding and cannot stop nursing for at least 14 days after vaccination.    Note: Yellow Fever vaccine virus may be transmissible via breast milk by nursing mothers who are vaccinated during the final 2 weeks of pregnancy or post-partum.   Following transmission, infants may develop encephalitis.  The minimum time of discontinuation of breastfeeding for 14 days after vaccination is based on the expected clearance of live-attenuated vaccine virus.       No     The patient is immunosuppressed, whether congenital or idiopathic, including for example, leukemia, lymphoma, other malignancies, and patients who are receiving immunosuppressant medications (e.g. Systemic corticosteroids [greater than the standard dose of topical or inhaled steroids], alkylating drugs, antimetabolites, of other cytotoxic or immunomodulatory drugs) or radiation therapy or organ transplantation.       No     The patient has known hypersensitivity to the active substance or to any of the excipients of Stamaril vaccine or to eggs or chicken proteins.     No     The patient is  symptomatic for human immunodeficiency virus (HIV) infection     No     The patient is asymptomatic for HIV infection but accompanied by evidence of severe immune suppression    Note:  Evidence of severe immune suppression includes CD4+ T-cell counts < 200 cubic millimeters (or < 15% total lymphocytes in children aged < 6 years), or as determined by the health care provider.       No     The patient has a history of thymus dysfunction (including myasthenia gravis, thymoma, thymectomy)     No     Moderate or severe febrile illness or acute illness    Note: Participation in the EAP can be reassessed when moderate or severe febrile illness or acute illness has resolved.       No             ASSESSMENT/PLAN:  1. Counseling for travel  Edited the diamox Rx 250mg BID as directed  Discussed zithromax for TD  -   - azithromycin (ZITHROMAX) 500 MG tablet; Take one tablet daily for up to 3 days as needed for traveler's diarrhea  Dispense: 3 tablet; Refill: 0    2. Vaccine for single bacterial disease  given  - C YELLOW FEVER IMMUNIZATION, LIVE, SQ (STAMARIL)  I have reviewed general recommendations for safe travel   including: food/water precautions, insect avoidance,   roadway safety. Educational materials and [employer] report provided.    Total visit time 30 minutes with over 50% of time spent counseling patient.

## 2018-10-23 NOTE — MR AVS SNAPSHOT
After Visit Summary   10/23/2018    Mago Santiago    MRN: [id number]           Patient Information     Date Of Birth          1955        Visit Information        Provider Department      10/23/2018 10:30 AM Monse Soriano, DO [facility]        Today's Diagnoses     Counseling for travel    -  1    Vaccine for single bacterial disease          Care Instructions    Today October 23, 2018 you received the    Yellow Fever (YF)  .    These appointments can be made as a NURSE ONLY visit.    **It is very important for the vaccinations to be given on the scheduled day(s), this helps ensure you receive the full effectiveness of the vaccine.**    Please call [facility] with any questions [phone]    Thank you for visiting [facility]              Follow-ups after your visit        Who to contact     If you have questions or need follow up information about today's clinic visit or your schedule please contact [facility] directly at [phone].  Normal or non-critical lab and imaging results will be communicated to you by [employer], letter or phone within 4 business days after the clinic has received the results. If you do not hear from us within 7 days, please contact the clinic through [place] [employer] or phone. If you have a critical or abnormal lab result, we will notify you by phone as soon as possible.  Submit refill requests through [employer] or call your pharmacy and they will forward the refill request to us. Please allow 3 business days for your refill to be completed.          Additional Information About Your Visit        [employer] Information     [employer] gives you secure access to your electronic health record. If you see a primary care provider, you can also send messages to your care team and make appointments. If you have questions, please call your primary care clinic.  If you do not have a primary care provider, please call  [phone] and they will assist you.        Care EveryWhere ID     This is your Care EveryWhere ID. This could be used by other organizations to access your [place] medical records  [id number]        Your Vitals Were     Temperature                   99  F (37.2  C) (Tympanic)            Blood Pressure from Last 3 Encounters:   10/23/18 104/68   10/08/18 100/60   09/20/18 106/66    Weight from Last 3 Encounters:   10/08/18 155 lb (70.3 kg)   09/20/18 156 lb (70.8 kg)   02/14/18 157 lb (71.2 kg)              We Performed the Following     C YELLOW FEVER IMMUNIZATION, LIVE, SQ (STAMARIL)          Today's Medication Changes          These changes are accurate as of 10/23/18 11:21 AM.  If you have any questions, ask your nurse or doctor.               Start taking these medicines.        Dose/Directions    azithromycin 500 MG tablet   Commonly known as:  ZITHROMAX   Used for:  Counseling for travel   Started by:  Monse Soriano DO        Take one tablet daily for up to 3 days as needed for traveler's diarrhea   Quantity:  3 tablet   Refills:  0         These medicines have changed or have updated prescriptions.        Dose/Directions    acetaZOLAMIDE 250 MG tablet   Commonly known as:  DIAMOX   This may have changed:    - how much to take  - additional instructions   Changed by:  Monse Soriano DO        Dose:  250 mg   Take 1 tablet (250 mg) by mouth 2 times daily Start 1 day prior to going to [facility], continue twice daily until at highest altitude,  and continue for 2 days after descent.   Quantity:  72 tablet   Refills:  0            Where to get your medicines      These medications were sent to [id number] - [place] - [address]  AT [address] [facility] [address]  [address] [place] [address]     Phone:  [phone]     azithromycin 500 MG tablet                Primary Care Provider Office Phone # Fax #    Hanny Kunz [phone] [address] [place] [address]        Equal Access to Services     [facility]: Hadii marc ku hadcindybonifacio Sobrigidoali, waaxda luqadaha, qaybta kaalmahanane arnold, elli jonas. So [facility] [phone].    ATENCIÓN: Si habla español, tiene a mckeon disposición servicios gratuitos de asistencia lingüística. [facility] [phone].    We comply with applicable federal civil rights laws and [place] laws. We do not discriminate on the basis of race, color, national origin, age, disability, sex, sexual orientation, or gender identity.            Thank you!     Thank you for choosing [facility]  for your care. Our goal is always to provide you with excellent care. Hearing back from our patients is one way we can continue to improve our services. Please take a few minutes to complete the written survey that you may receive in the mail after your visit with us. Thank you!             Your Updated Medication List - Protect others around you: Learn how to safely use, store and throw away your medicines at www.disposemymeds.org.          This list is accurate as of 10/23/18 11:21 AM.  Always use your most recent med list.                   Brand Name Dispense Instructions for use Diagnosis    acetaZOLAMIDE 250 MG tablet    DIAMOX    72 tablet    Take 1 tablet (250 mg) by mouth 2 times daily Start 1 day prior to going to [facility], continue twice daily until at highest altitude,  and continue for 2 days after descent.        anastrozole 1 MG tablet    ARIMIDEX    90 tablet    Take 1 tablet (1 mg) by mouth daily    Malignant neoplasm of central portion of left female breast, unspecified estrogen receptor status (H)       ASPIRIN PO      Take 81 mg by mouth        azithromycin 500 MG tablet    ZITHROMAX    3 tablet    Take one tablet daily for up to 3 days as needed for traveler's diarrhea    Counseling for travel       ciprofloxacin 500 MG tablet    CIPRO    6 tablet    Take 1 tablet (500 mg) by mouth 2  times daily    Travel advice encounter       FLUoxetine 20 MG capsule    PROzac    90 capsule    TAKE 1 CAPSULE(20 MG) BY MOUTH DAILY    Anxiety       multivitamin, therapeutic Tabs tablet      Take 1 tablet by mouth daily        typhoid CR capsule    VIVOTIF    4 capsule    Take 1 capsule by mouth every other day    Travel advice encounter       VITAMIN D (CHOLECALCIFEROL) PO      Take 1,000 Units by mouth daily

## 2018-10-23 NOTE — NURSING NOTE
"Chief Complaint   Patient presents with     Travel Clinic     /68  Temp 99  F (37.2  C) (Tympanic) Estimated body mass index is 23.57 kg/(m^2) as calculated from the following:    Height as of 10/8/18: 5' 8\" (1.727 m).    Weight as of 10/8/18: 155 lb (70.3 kg).        Kiera Walker CMA  "

## 2018-10-29 ENCOUNTER — VIRTUAL VISIT (OUTPATIENT)
Dept: FAMILY MEDICINE | Facility: OTHER | Age: 63
End: 2018-10-29

## 2018-10-30 ENCOUNTER — OFFICE VISIT (OUTPATIENT)
Dept: URGENT CARE | Facility: URGENT CARE | Age: 63
End: 2018-10-30
Payer: COMMERCIAL

## 2018-10-30 VITALS
HEART RATE: 57 BPM | OXYGEN SATURATION: 98 % | BODY MASS INDEX: 23.46 KG/M2 | DIASTOLIC BLOOD PRESSURE: 64 MMHG | WEIGHT: 154.31 LBS | TEMPERATURE: 98.5 F | SYSTOLIC BLOOD PRESSURE: 96 MMHG

## 2018-10-30 DIAGNOSIS — R39.89 URINARY PROBLEM: ICD-10-CM

## 2018-10-30 DIAGNOSIS — R30.0 DYSURIA: Primary | ICD-10-CM

## 2018-10-30 LAB
ALBUMIN UR-MCNC: NEGATIVE MG/DL
APPEARANCE UR: CLEAR
BACTERIA #/AREA URNS HPF: ABNORMAL /HPF
BILIRUB UR QL STRIP: NEGATIVE
COLOR UR AUTO: YELLOW
GLUCOSE UR STRIP-MCNC: NEGATIVE MG/DL
HGB UR QL STRIP: ABNORMAL
KETONES UR STRIP-MCNC: NEGATIVE MG/DL
LEUKOCYTE ESTERASE UR QL STRIP: NEGATIVE
NITRATE UR QL: NEGATIVE
PH UR STRIP: 5.5 PH (ref 5–7)
RBC #/AREA URNS AUTO: ABNORMAL /HPF
SOURCE: ABNORMAL
SP GR UR STRIP: 1.01 (ref 1–1.03)
UROBILINOGEN UR STRIP-ACNC: 0.2 EU/DL (ref 0.2–1)
WBC #/AREA URNS AUTO: ABNORMAL /HPF

## 2018-10-30 PROCEDURE — 81001 URINALYSIS AUTO W/SCOPE: CPT | Performed by: FAMILY MEDICINE

## 2018-10-30 PROCEDURE — 99213 OFFICE O/P EST LOW 20 MIN: CPT | Performed by: FAMILY MEDICINE

## 2018-10-30 RX ORDER — NITROFURANTOIN 25; 75 MG/1; MG/1
100 CAPSULE ORAL 2 TIMES DAILY
Qty: 14 CAPSULE | Refills: 0 | Status: SHIPPED | OUTPATIENT
Start: 2018-10-30 | End: 2019-04-11

## 2018-10-30 NOTE — PATIENT INSTRUCTIONS
Pyridium 1 tablet up to three times daily as needed for the next 3 days.    Take nitrofurantoin 100 mg twice daily for 7 days.    Lots of water.    Have a fantastic trip!

## 2018-10-30 NOTE — MR AVS SNAPSHOT
After Visit Summary   10/30/2018    Mago Santiago    MRN: [id number]           Patient Information     Date Of Birth          1955        Visit Information        Provider Department      10/30/2018 9:05 AM Drea Hui MD [facility]        Today's Diagnoses     Urinary problem    -  1    Dysuria          Care Instructions    Pyridium 1 tablet up to three times daily as needed for the next 3 days.    Take nitrofurantoin 100 mg twice daily for 7 days.    Lots of water.    Have a fantastic trip!            Follow-ups after your visit        Who to contact     If you have questions or need follow up information about today's clinic visit or your schedule please contact [facility] directly at [phone].  Normal or non-critical lab and imaging results will be communicated to you by [employer], letter or phone within 4 business days after the clinic has received the results. If you do not hear from us within 7 days, please contact the clinic through [employer] or phone. If you have a critical or abnormal lab result, we will notify you by phone as soon as possible.  Submit refill requests through [employer] or call your pharmacy and they will forward the refill request to us. Please allow 3 business days for your refill to be completed.          Additional Information About Your Visit        [employer] Information     [employer] gives you secure access to your electronic health record. If you see a primary care provider, you can also send messages to your care team and make appointments. If you have questions, please call your primary care clinic.  If you do not have a primary care provider, please call [phone] and they will assist you.        Care EveryWhere ID     This is your Care EveryWhere ID. This could be used by other organizations to access your [place] medical records  [id number]        Your Vitals Were     Pulse Temperature Pulse Oximetry Breastfeeding? BMI  (Body Mass Index)       57 98.5  F (36.9  C) (Tympanic) 98% No 23.46 kg/m2        Blood Pressure from Last 3 Encounters:   10/30/18 96/64   10/23/18 104/68   10/08/18 100/60    Weight from Last 3 Encounters:   10/30/18 154 lb 5 oz (70 kg)   10/08/18 155 lb (70.3 kg)   09/20/18 156 lb (70.8 kg)              We Performed the Following     *UA reflex to Microscopic and Culture ([place] [facility] (except [place] and [place])     Urine Microscopic          Today's Medication Changes          These changes are accurate as of 10/30/18  9:30 AM.  If you have any questions, ask your nurse or doctor.               Start taking these medicines.        Dose/Directions    nitroFURantoin (macrocrystal-monohydrate) 100 MG capsule   Commonly known as:  MACROBID   Used for:  Dysuria   Started by:  Drea Hui MD        Dose:  100 mg   Take 1 capsule (100 mg) by mouth 2 times daily for 7 days   Quantity:  14 capsule   Refills:  0            Where to get your medicines      These medications were sent to [id number] - [place], [place] - [address]  AT [address] [facility] [address]  [address] [place] [address]     Phone:  [phone]     nitroFURantoin (macrocrystal-monohydrate) 100 MG capsule                Primary Care Provider Office Phone # Fax #    Hanny Kunz [phone] [address] [place] [address]        Equal Access to Services     [facility]: Hadii marc ku hadasho Soomaali, waaxda luqadaha, qaybta kaalmada adeegyada, elli jonas. So [facility] [phone].    ATENCIÓN: Si habla español, tiene a mckeon disposición servicios gratuitos de asistencia lingüística. Llame al [phone].    We comply with applicable federal civil rights laws and [place] laws. We do not discriminate on the basis of race, color, national origin, age, disability, sex, sexual orientation, or gender identity.            Thank you!      Thank you for choosing [facility]  for your care. Our goal is always to provide you with excellent care. Hearing back from our patients is one way we can continue to improve our services. Please take a few minutes to complete the written survey that you may receive in the mail after your visit with us. Thank you!             Your Updated Medication List - Protect others around you: Learn how to safely use, store and throw away your medicines at www.disposemymeds.org.          This list is accurate as of 10/30/18  9:30 AM.  Always use your most recent med list.                   Brand Name Dispense Instructions for use Diagnosis    acetaZOLAMIDE 250 MG tablet    DIAMOX    72 tablet    Take 1 tablet (250 mg) by mouth 2 times daily Start 1 day prior to going to [facility], continue twice daily until at highest altitude,  and continue for 2 days after descent.        anastrozole 1 MG tablet    ARIMIDEX    90 tablet    Take 1 tablet (1 mg) by mouth daily    Malignant neoplasm of central portion of left female breast, unspecified estrogen receptor status (H)       ASPIRIN PO      Take 81 mg by mouth        azithromycin 500 MG tablet    ZITHROMAX    3 tablet    Take one tablet daily for up to 3 days as needed for traveler's diarrhea    Counseling for travel       ciprofloxacin 500 MG tablet    CIPRO    6 tablet    Take 1 tablet (500 mg) by mouth 2 times daily    Travel advice encounter       FLUoxetine 20 MG capsule    PROzac    90 capsule    TAKE 1 CAPSULE(20 MG) BY MOUTH DAILY    Anxiety       multivitamin, therapeutic Tabs tablet      Take 1 tablet by mouth daily        nitroFURantoin (macrocrystal-monohydrate) 100 MG capsule    MACROBID    14 capsule    Take 1 capsule (100 mg) by mouth 2 times daily for 7 days    Dysuria       typhoid CR capsule    VIVOTIF    4 capsule    Take 1 capsule by mouth every other day    Travel advice encounter       VITAMIN D (CHOLECALCIFEROL) PO      Take 1,000 Units by mouth  daily

## 2018-10-30 NOTE — PROGRESS NOTES
"Date:   Clinician: Ford Blackburn  Clinician NPI: [id number]  Patient: Mago Santiago  Patient : 1956  Patient Address: [address] [place] [address]  Patient Phone: [phone]  Visit Protocol: UTI  Patient Summary:  Mago is a 62 year old ( : 1956 ) female who initiated a Visit for a presumed bladder infection. When asked the question \"Please sign me up to receive news, health information and promotions. \", Mago responded \"No\".   Her symptoms started 1-3 days ago and consist of urgency, foul-smelling urine, feeling as if the bladder is never empty, dysuria, and urinary frequency.   Symptom details   Urine color: The color of her urine is yellow.    Denied symptoms include flank pain, chills, vaginal itching, nausea, urinary incontinence, vaginal discharge, abdominal pain, and vomiting. She does not feel feverish.   Mago has not used any over-the-counter medications or home remedies to relieve her current symptoms.  Precipitating events  Mago denies having a sexually transmitted disease.  Pertinent medical history  Mago has had a bladder infection before and has had 1 in the past 12 months. Her most recent bladder infection was not within the last 4 weeks. Her current symptoms are similar to her previous bladder infection symptoms.   She is not sure what antibiotics have been effective in treating her past bladder infections.   Mago has not been prescribed antibiotics to prevent frequent or repeated bladder infections in the past and does not get yeast infections when she takes antibiotics. She has not experienced problems or side effects with any of the common antibiotics used to treat bladder infections.   Mago does not have a history of kidney stones. She has not used a catheter or been a patient in a hospital or nursing home in the past 2 weeks.   Mago does not smoke or use smokeless tobacco.   Additional information as reported by the patient (free text): I am " currently taking Cipro (500mg) but my last dosage is today and the symptoms are still present.  It has not been very effective in the past either.  I am leaving for a 19 day trip to [place] on Thursday and need to get the UTI cleared up!   MEDICATIONS: Arimidex oral, Vivotif oral, ciprofloxacin oral, ALLERGIES: NKDA  Clinician Response:  Dear Mago,   I am sorry you are not feeling well. To determine the most appropriate care for you, I would like you to be seen in person to further discuss your health history and symptoms.  You will not be charged for this Visit. Thank you for trusting us with your care.   Diagnosis: Refer for additional evaluation  Diagnosis ICD: R69  Diagnosis ICD: 462.0

## 2018-10-30 NOTE — PROGRESS NOTES
SUBJECTIVE:   Mago Santiago is a 63 year old female who  presents today for a possible UTI. Symptoms of dysuria and frequency have been going on for 4day(s).  Hematuria no.  sudden onset and still presentand moderate.  There is no history of fever, chills, nausea or vomiting.  No history of vaginal discharge. This patient does have a history of urinary tract infections, but not frequently. Patient denies long duration, rigors and flank pain.    Pt took a 3-day course of ciprofloxacin starting Saturday.  [place] better Sunday, but now worsening again.    Pt is leaving for [place] early on Thursday morning.    Past Medical History:   Diagnosis Date     Anxiety      Breast cancer (H) 2011    diagnosed 2011- lumpectomy chemo and radaition and now on aromatase inhibitor     Celiac disease     endoscopy diagnosed     Current Outpatient Prescriptions   Medication Sig Dispense Refill     acetaZOLAMIDE (DIAMOX) 250 MG tablet Take 1 tablet (250 mg) by mouth 2 times daily Start 1 day prior to going to [facility], continue twice daily until at highest altitude,  and continue for 2 days after descent. 72 tablet 0     anastrozole (ARIMIDEX) 1 MG tablet Take 1 tablet (1 mg) by mouth daily 90 tablet 3     ASPIRIN PO Take 81 mg by mouth       ciprofloxacin (CIPRO) 500 MG tablet Take 1 tablet (500 mg) by mouth 2 times daily 6 tablet 0     multivitamin, therapeutic (THERA-VIT) TABS Take 1 tablet by mouth daily       VITAMIN D, CHOLECALCIFEROL, PO Take 1,000 Units by mouth daily       azithromycin (ZITHROMAX) 500 MG tablet Take one tablet daily for up to 3 days as needed for traveler's diarrhea (Patient not taking: Reported on 10/30/2018) 3 tablet 0     FLUoxetine (PROZAC) 20 MG capsule TAKE 1 CAPSULE(20 MG) BY MOUTH DAILY (Patient not taking: Reported on 10/30/2018) 90 capsule 3     typhoid (VIVOTIF) CR capsule Take 1 capsule by mouth every other day (Patient not taking: Reported on 10/30/2018) 4 capsule 0     Social History   Substance Use  Topics     Smoking status: Never Smoker     Smokeless tobacco: Never Used     Alcohol use 0.0 oz/week     0 Standard drinks or equivalent per week      Comment: Seldom       ROS:   As above.    OBJECTIVE:  BP 96/64 (BP Location: Right arm, Patient Position: Chair, Cuff Size: Adult Large)  Pulse 57  Temp 98.5  F (36.9  C) (Tympanic)  Wt 154 lb 5 oz (70 kg)  SpO2 98%  Breastfeeding? No  BMI 23.46 kg/m2  GENERAL APPEARANCE: healthy, alert and no distress  BACK: No CVA tenderness    Results for orders placed or performed in visit on 10/30/18   *UA reflex to Microscopic and Culture ([place] and [facility] (except [place] and [place])   Result Value Ref Range    Color Urine Yellow     Appearance Urine Clear     Glucose Urine Negative NEG^Negative mg/dL    Bilirubin Urine Negative NEG^Negative    Ketones Urine Negative NEG^Negative mg/dL    Specific Gravity Urine 1.010 1.003 - 1.035    Blood Urine Moderate (A) NEG^Negative    pH Urine 5.5 5.0 - 7.0 pH    Protein Albumin Urine Negative NEG^Negative mg/dL    Urobilinogen Urine 0.2 0.2 - 1.0 EU/dL    Nitrite Urine Negative NEG^Negative    Leukocyte Esterase Urine Negative NEG^Negative    Source Midstream Urine    Urine Microscopic   Result Value Ref Range    WBC Urine 0 - 5 OTO5^0 - 5 /HPF    RBC Urine 5-10 (A) OTO2^O - 2 /HPF    Bacteria Urine Few (A) NEG^Negative /HPF     ASSESSMENT:   Dysuria, likely due to UTI not responding to ciprofloxacin but hard to tell based on labs post-antibiotics  Culture pending.    PLAN:  Nitrofurantoin 100 mg BID x 7 days as per ordered above  Drink plenty of fluids.  Signs and symptoms of pyelonephritis mentioned. Pt will be available by cell phone while she's abroad (confirmed number in chart) in case follow-up is needed after early morning on Thursday.

## 2019-02-12 ENCOUNTER — ANCILLARY PROCEDURE (OUTPATIENT)
Dept: MAMMOGRAPHY | Facility: CLINIC | Age: 64
End: 2019-02-12
Payer: COMMERCIAL

## 2019-02-12 DIAGNOSIS — C50.919 MALIGNANT NEOPLASM OF BREAST IN FEMALE, ESTROGEN RECEPTOR POSITIVE, UNSPECIFIED LATERALITY, UNSPECIFIED SITE OF BREAST (H): ICD-10-CM

## 2019-02-12 DIAGNOSIS — Z17.0 MALIGNANT NEOPLASM OF BREAST IN FEMALE, ESTROGEN RECEPTOR POSITIVE, UNSPECIFIED LATERALITY, UNSPECIFIED SITE OF BREAST (H): ICD-10-CM

## 2019-02-12 PROCEDURE — 77063 BREAST TOMOSYNTHESIS BI: CPT | Mod: TC

## 2019-02-12 PROCEDURE — 77067 SCR MAMMO BI INCL CAD: CPT | Mod: TC

## 2019-02-18 DIAGNOSIS — C50.112 MALIGNANT NEOPLASM OF CENTRAL PORTION OF LEFT FEMALE BREAST, UNSPECIFIED ESTROGEN RECEPTOR STATUS (H): ICD-10-CM

## 2019-02-18 RX ORDER — ANASTROZOLE 1 MG/1
1 TABLET ORAL DAILY
Qty: 90 TABLET | Refills: 3 | Status: SHIPPED | OUTPATIENT
Start: 2019-02-18 | End: 2019-11-20

## 2019-04-10 ENCOUNTER — OFFICE VISIT (OUTPATIENT)
Dept: PODIATRY | Facility: CLINIC | Age: 64
End: 2019-04-10
Payer: COMMERCIAL

## 2019-04-10 ENCOUNTER — ANCILLARY PROCEDURE (OUTPATIENT)
Dept: GENERAL RADIOLOGY | Facility: CLINIC | Age: 64
End: 2019-04-10
Attending: PODIATRIST
Payer: COMMERCIAL

## 2019-04-10 VITALS
WEIGHT: 154 LBS | HEIGHT: 68 IN | DIASTOLIC BLOOD PRESSURE: 62 MMHG | SYSTOLIC BLOOD PRESSURE: 106 MMHG | BODY MASS INDEX: 23.34 KG/M2

## 2019-04-10 DIAGNOSIS — M79.672 LEFT FOOT PAIN: Primary | ICD-10-CM

## 2019-04-10 DIAGNOSIS — Q66.70 PES CAVUS: ICD-10-CM

## 2019-04-10 DIAGNOSIS — M21.622 TAILOR'S BUNIONETTE, LEFT: ICD-10-CM

## 2019-04-10 DIAGNOSIS — M77.52 TENDINITIS OF LEFT FOOT: ICD-10-CM

## 2019-04-10 DIAGNOSIS — M79.672 LEFT FOOT PAIN: ICD-10-CM

## 2019-04-10 PROCEDURE — 99203 OFFICE O/P NEW LOW 30 MIN: CPT | Performed by: PODIATRIST

## 2019-04-10 PROCEDURE — 73630 X-RAY EXAM OF FOOT: CPT | Mod: LT

## 2019-04-10 ASSESSMENT — MIFFLIN-ST. JEOR: SCORE: 1297.04

## 2019-04-10 NOTE — LETTER
4/10/2019         RE: Mago Santiago  [address] [place] [address]        Dear Colleague,    Thank you for referring your patient, Mago Santiago, to the [facility]. Please see a copy of my visit note below.    PATIENT HISTORY:   Mago Santiago is a 63 year old female who presents to clinic for pain to the left foot. Noticed bruising to it. Has been going on for 1 week. Denies specific injury but did a lot of walking and hiking. Pain si 8/10 with walking. Has iced and decreased activity with minimal relief. Wondering what is causing pain. Is training for a long hike.     Review of Systems:  Patient denies fever, chills, rash, wound, stiffness, numbness, weakness, heart burn, blood in stool, chest pain with activity, calf pain when walking, shortness of breath with activity, chronic cough, easy bleeding/bruising, swelling of ankles, excessive thirst, fatigue, depression, anxiety.  Patient admits to limping.     PAST MEDICAL HISTORY:   Past Medical History:   Diagnosis Date     Anxiety      Breast cancer (H) 2011    diagnosed 2011- lumpectomy chemo and radaition and now on aromatase inhibitor     Celiac disease     endoscopy diagnosed        PAST SURGICAL HISTORY:   Past Surgical History:   Procedure Laterality Date     ENT SURGERY      wisdom teeth     LASIK  1995    right eye     LUMPECTOMY BREAST  2011    left ; breast cancer         MEDICATIONS:   Current Outpatient Medications:      acetaZOLAMIDE (DIAMOX) 250 MG tablet, Take 1 tablet (250 mg) by mouth 2 times daily Start 1 day prior to going to [facility], continue twice daily until at highest altitude,  and continue for 2 days after descent., Disp: 72 tablet, Rfl: 0     anastrozole (ARIMIDEX) 1 MG tablet, Take 1 tablet (1 mg) by mouth daily, Disp: 90 tablet, Rfl: 3     ASPIRIN PO, Take 81 mg by mouth, Disp: , Rfl:      ciprofloxacin (CIPRO) 500 MG tablet, Take 1 tablet (500 mg) by mouth 2 times daily, Disp: 6 tablet, Rfl: 0      multivitamin, therapeutic (THERA-VIT) TABS, Take 1 tablet by mouth daily, Disp: , Rfl:      VITAMIN D, CHOLECALCIFEROL, PO, Take 1,000 Units by mouth daily, Disp: , Rfl:      azithromycin (ZITHROMAX) 500 MG tablet, Take one tablet daily for up to 3 days as needed for traveler's diarrhea (Patient not taking: Reported on 10/30/2018), Disp: 3 tablet, Rfl: 0     FLUoxetine (PROZAC) 20 MG capsule, TAKE 1 CAPSULE(20 MG) BY MOUTH DAILY (Patient not taking: Reported on 10/30/2018), Disp: 90 capsule, Rfl: 3     typhoid (VIVOTIF) CR capsule, Take 1 capsule by mouth every other day (Patient not taking: Reported on 10/30/2018), Disp: 4 capsule, Rfl: 0     ALLERGIES:  No Known Allergies     SOCIAL HISTORY:   Social History     Socioeconomic History     Marital status:      Spouse name: Not on file     Number of children: Not on file     Years of education: Not on file     Highest education level: Not on file   Occupational History     Not on file   Social Needs     Financial resource strain: Not on file     Food insecurity:     Worry: Not on file     Inability: Not on file     Transportation needs:     Medical: Not on file     Non-medical: Not on file   Tobacco Use     Smoking status: Never Smoker     Smokeless tobacco: Never Used   Substance and Sexual Activity     Alcohol use: Yes     Alcohol/week: 0.0 oz     Comment: Seldom     Drug use: No     Sexual activity: Not Currently   Lifestyle     Physical activity:     Days per week: Not on file     Minutes per session: Not on file     Stress: Not on file   Relationships     Social connections:     Talks on phone: Not on file     Gets together: Not on file     Attends Y[employer]dan[employer] service: Not on file     Active member of club or organization: Not on file     Attends meetings of clubs or organizations: Not on file     Relationship status: Not on file     Intimate partner violence:     Fear of current or ex partner: Not on file     Emotionally abused: Not on file     Physically  "abused: Not on file     Forced sexual activity: Not on file   Other Topics Concern     Parent/sibling w/ CABG, MI or angioplasty before 65F 55M? Not Asked   Social History Narrative     Not on file        FAMILY HISTORY:   Family History   Problem Relation Age of Onset     Genetic Disorder Mother         MS     Prostate Cancer Father      Genetic Disorder Sister         MS     Diabetes Daughter         Type 1        EXAM:Vitals: /62   Ht 1.727 m (5' 8\")   Wt 69.9 kg (154 lb)   BMI 23.42 kg/m     BMI= Body mass index is 23.42 kg/m .    General appearance: Patient is alert and fully cooperative with history & exam.  No sign of distress is noted during the visit.     Psychiatric: Affect is pleasant & appropriate.  Patient appears motivated to improve health.     Respiratory: Breathing is regular & unlabored while sitting.     HEENT: Hearing is intact to spoken word.  Speech is clear.  No gross evidence of visual impairment that would impact ambulation.     Dermatologic: small hyperkeratotic lesion to plantar left 5th metatarsal head.      Vascular: DP & PT pulses are intact & regular bilaterally.  No significant edema or varicosities noted.  CFT and skin temperature is normal to both lower extremities.     Neurologic: Lower extremity sensation is intact to light touch.  No evidence of weakness or contracture in the lower extremities.  No evidence of neuropathy.     Musculoskeletal: Patient is ambulatory without increase arch height. Pain onpalpation just proximal to left 5th met head. No gross ankle deformity noted.  No foot or ankle joint effusion is noted.    Radiographs:  I personally reviewed the xrays. Nor signs of fractures. Increase calcaneal inclination angle.      ASSESSMENT:    Left foot pain  Tendinitis of left foot  Tailor's bunionette, left  Pes cavus     PLAN:  Reviewed patient's chart in [facility]. Reviewed xrays. Currently no signs of fracture. Talked about stress fracture. Reviewed and discussed " causes of tailors bunion with patient.  Explained that it is in large part due to a patients foot type.   Discussed treatment options with patient including orthotics, splints, pads, and shoe gear.  We discussed that sometimes cortisone injections can help with the pain or physical therapy treatments such as ultrasound to help with pain but does not fix the problem.  Discussed that this is normally a structural issue in the foot and if conservative therapy doesn't work, surgery is considered.  Distal osteotomy versus spur removal.  With a distal osteotomy procedure, patient is normally minimally weight bearing in a cam boot for 6 weeks.  With spur removal, patient is normally minimal weight bearing in cam boot for 2-3 weeks.  With any surgery, patient needs to take the first 2 weeks off work for icing and elevating and could possible due seated work only for the remaining 4 weeks after that.  We discussed risks of surgery including infection, neuritis, non union, need for further surgery.    Reviewed and discussed causes of tendonitis.  We discussed treatments such as immobiliation, icing, stretching, heel lifts, orthotics, physical therapy, MRI.     Discussed it could be tendonitis as well given her increase training and foot structure. She would like to try inserts, icing and topical pain cream at this time. If pain continues, recommend boot and would treat like stress fracture.        Katie Morocho DPM, Podiatry/Foot and Ankle Surgery          Again, thank you for allowing me to participate in the care of your patient.        Sincerely,        Katie Morocho DPM, Podiatry/Foot and Ankle Surgery

## 2019-04-10 NOTE — PROGRESS NOTES
PATIENT HISTORY:   Mago Santiago is a 63 year old female who presents to clinic for pain to the left foot. Noticed bruising to it. Has been going on for 1 week. Denies specific injury but did a lot of walking and hiking. Pain si 8/10 with walking. Has iced and decreased activity with minimal relief. Wondering what is causing pain. Is training for a long hike.     Review of Systems:  Patient denies fever, chills, rash, wound, stiffness, numbness, weakness, heart burn, blood in stool, chest pain with activity, calf pain when walking, shortness of breath with activity, chronic cough, easy bleeding/bruising, swelling of ankles, excessive thirst, fatigue, depression, anxiety.  Patient admits to limping.     PAST MEDICAL HISTORY:   Past Medical History:   Diagnosis Date     Anxiety      Breast cancer (H) 2011    diagnosed 2011- lumpectomy chemo and radaition and now on aromatase inhibitor     Celiac disease     endoscopy diagnosed        PAST SURGICAL HISTORY:   Past Surgical History:   Procedure Laterality Date     ENT SURGERY      wisdom teeth     LASIK  1995    right eye     LUMPECTOMY BREAST  2011    left ; breast cancer         MEDICATIONS:   Current Outpatient Medications:      acetaZOLAMIDE (DIAMOX) 250 MG tablet, Take 1 tablet (250 mg) by mouth 2 times daily Start 1 day prior to going to [facility], continue twice daily until at highest altitude,  and continue for 2 days after descent., Disp: 72 tablet, Rfl: 0     anastrozole (ARIMIDEX) 1 MG tablet, Take 1 tablet (1 mg) by mouth daily, Disp: 90 tablet, Rfl: 3     ASPIRIN PO, Take 81 mg by mouth, Disp: , Rfl:      ciprofloxacin (CIPRO) 500 MG tablet, Take 1 tablet (500 mg) by mouth 2 times daily, Disp: 6 tablet, Rfl: 0     multivitamin, therapeutic (THERA-VIT) TABS, Take 1 tablet by mouth daily, Disp: , Rfl:      VITAMIN D, CHOLECALCIFEROL, PO, Take 1,000 Units by mouth daily, Disp: , Rfl:      azithromycin (ZITHROMAX) 500 MG tablet, Take one tablet daily for up to 3  days as needed for traveler's diarrhea (Patient not taking: Reported on 10/30/2018), Disp: 3 tablet, Rfl: 0     FLUoxetine (PROZAC) 20 MG capsule, TAKE 1 CAPSULE(20 MG) BY MOUTH DAILY (Patient not taking: Reported on 10/30/2018), Disp: 90 capsule, Rfl: 3     typhoid (VIVOTIF) CR capsule, Take 1 capsule by mouth every other day (Patient not taking: Reported on 10/30/2018), Disp: 4 capsule, Rfl: 0     ALLERGIES:  No Known Allergies     SOCIAL HISTORY:   Social History     Socioeconomic History     Marital status:      Spouse name: Not on file     Number of children: Not on file     Years of education: Not on file     Highest education level: Not on file   Occupational History     Not on file   Social Needs     Financial resource strain: Not on file     Food insecurity:     Worry: Not on file     Inability: Not on file     Transportation needs:     Medical: Not on file     Non-medical: Not on file   Tobacco Use     Smoking status: Never Smoker     Smokeless tobacco: Never Used   Substance and Sexual Activity     Alcohol use: Yes     Alcohol/week: 0.0 oz     Comment: Seldom     Drug use: No     Sexual activity: Not Currently   Lifestyle     Physical activity:     Days per week: Not on file     Minutes per session: Not on file     Stress: Not on file   Relationships     Social connections:     Talks on phone: Not on file     Gets together: Not on file     Attends [employer] service: Not on file     Active member of club or organization: Not on file     Attends meetings of clubs or organizations: Not on file     Relationship status: Not on file     Intimate partner violence:     Fear of current or ex partner: Not on file     Emotionally abused: Not on file     Physically abused: Not on file     Forced sexual activity: Not on file   Other Topics Concern     Parent/sibling w/ CABG, MI or angioplasty before 65F 55M? Not Asked   Social History Narrative     Not on file        FAMILY HISTORY:   Family History   Problem  "Relation Age of Onset     Genetic Disorder Mother         MS     Prostate Cancer Father      Genetic Disorder Sister         MS     Diabetes Daughter         Type 1        EXAM:Vitals: /62   Ht 1.727 m (5' 8\")   Wt 69.9 kg (154 lb)   BMI 23.42 kg/m    BMI= Body mass index is 23.42 kg/m .    General appearance: Patient is alert and fully cooperative with history & exam.  No sign of distress is noted during the visit.     Psychiatric: Affect is pleasant & appropriate.  Patient appears motivated to improve health.     Respiratory: Breathing is regular & unlabored while sitting.     HEENT: Hearing is intact to spoken word.  Speech is clear.  No gross evidence of visual impairment that would impact ambulation.     Dermatologic: small hyperkeratotic lesion to plantar left 5th metatarsal head.      Vascular: DP & PT pulses are intact & regular bilaterally.  No significant edema or varicosities noted.  CFT and skin temperature is normal to both lower extremities.     Neurologic: Lower extremity sensation is intact to light touch.  No evidence of weakness or contracture in the lower extremities.  No evidence of neuropathy.     Musculoskeletal: Patient is ambulatory without increase arch height. Pain onpalpation just proximal to left 5th met head. No gross ankle deformity noted.  No foot or ankle joint effusion is noted.    Radiographs:  I personally reviewed the xrays. Nor signs of fractures. Increase calcaneal inclination angle.      ASSESSMENT:    Left foot pain  Tendinitis of left foot  Tailor's bunionette, left  Pes cavus     PLAN:  Reviewed patient's chart in [facility]. Reviewed xrays. Currently no signs of fracture. Talked about stress fracture. Reviewed and discussed causes of tailors bunion with patient.  Explained that it is in large part due to a patients foot type.   Discussed treatment options with patient including orthotics, splints, pads, and shoe gear.  We discussed that sometimes cortisone injections can " help with the pain or physical therapy treatments such as ultrasound to help with pain but does not fix the problem.  Discussed that this is normally a structural issue in the foot and if conservative therapy doesn't work, surgery is considered.  Distal osteotomy versus spur removal.  With a distal osteotomy procedure, patient is normally minimally weight bearing in a cam boot for 6 weeks.  With spur removal, patient is normally minimal weight bearing in cam boot for 2-3 weeks.  With any surgery, patient needs to take the first 2 weeks off work for icing and elevating and could possible due seated work only for the remaining 4 weeks after that.  We discussed risks of surgery including infection, neuritis, non union, need for further surgery.    Reviewed and discussed causes of tendonitis.  We discussed treatments such as immobiliation, icing, stretching, heel lifts, orthotics, physical therapy, MRI.     Discussed it could be tendonitis as well given her increase training and foot structure. She would like to try inserts, icing and topical pain cream at this time. If pain continues, recommend boot and would treat like stress fracture.        Katie Morocho DPM, Podiatry/Foot and Ankle Surgery

## 2019-04-10 NOTE — PATIENT INSTRUCTIONS
Thank you for choosing [facility]!    Apply Bengay or Biofreeze to foot for pain    DR. ROBERTS'S CLINIC SCHEDULE  MONDAY AM - [place] TUESDAY - [place]   [address] [address] [place] [address] [place] [address]   [phone] / [phone] / [phone]       WEDNESDAY - [place] FRIDAY AM - [facility]   [address]   [place] [address] [place] [address]   [phone] / [phone]       FRIDAY PM - [place] SCHEDULE SURGERY: [phone] [address] BILLING QUESTIONS: [phone]   [place] [address] AFTER HOURS: [phone] / [phone] APPOINTMENTS: [phone]     Consumer Price Line (CPL) [phone]       TENDONITIS   Tendons are the strong fibrous portions ofmuscles that attach to bones and allow the muscle to move a joint when it contracts. Tendons are very strong because they have a lot of force exerted on them. Sometimes tendons can become painful because they have suffered an acute injury, in which too much force was exerted at one time, or an overuse injury, in which a normal force was exerted too frequently or over a prolonged period of time. As a result, there is damage to the tendon and its surrounding soft tissue structures and they become inflammed. Because tendons do not have a great blood supply, they do not heal rapidly and the inflammation can become chronic.   Conservative treatment for tendinitis involves rest and anti-inflammatory measures. Ice is applied 15 minutes 2-3 times daily. Anti-inflammatory medications called NSAIDs (ibuprofen, example) can be taken provided they are used with caution, as they can lead to internal bleeding and increase the risk ofstroke and heart attack. Sometimes topical nitroglycerin is prescribed to help with pain. Often your doctor will use a special shoe or removable walking cast to immobilize  the tendon, allowing it to heal without further damage from use. These devices are very useful in helping tendons heal, but they may slow you down or make you feel like your hip, knee, or back are out ofalignment. This is temporary and should go away once you are out ofthe immobilization. You should not use a walking cast when showering or driving. Another option is Platelet Rich Plasma injections. (Normally done with a Sports and Orthorapedic doctor.   If conservative measures fail, your physician may need to surgically repair the tendon by removing any chronic inflammatory tissue and sewing it back together. Sometimes it is sewn to an adjacent tendon with similar function for support and sometimes it is lengthened. . Sometimes the bones around the tendon need to be realigned or reshaped to better support the tendon or prevent further damage. Your foot and ankle surgeon will discuss the specifics of your surgery with you, should you need it.    Towel stretch: Sit on a hard surface with your injured leg stretched out in front of you. Loop a towel around your toes and the ball of your foot and pull the towel toward your body keeping your leg straight. Hold this position for 15 to 30 seconds and then relax. Repeat 3 times. Then push the towel away with the ball of your foot. Repeat 3 times.  When you don't feel much of a stretch using the towel, you can start the standing calf stretch and the following exercises.  Standing calf stretch: Stand facing a wall with your hands on the wall at about eye level. Keep your injured leg back with your heel on the floor. Keep the other leg forward with the knee bent. Turn your back foot slightly inward (as if you were pigeon-toed). Slowly lean into the wall until you feel a stretch in the back of your calf. Hold the stretch for 15 to 30 seconds. Return to the starting position. Repeat 3 times. Do this exercise several times each day.   Standing soleus stretch: Stand facing a wall  with your hands on the wall at about chest height. Keep your injured leg back with your heel on the floor. Keep the other leg forward with the knee bent. Turn your back foot slightly inward (as if you were pigeon-toed). Bend your back knee slightly and gently lean into the wall until you feel a stretch in the lower calf of your injured leg. Hold the stretch for 15 to 30 seconds. Return to the starting position. Repeat 3 times.   Achilles stretch: Stand with the ball of one foot on a stair. Reach for the step below with your heel until you feel a stretch in the arch of your foot. Hold this position for 15 to 30 seconds and then relax. Repeat 3 times.   Heel raise: Balance yourself while standing behind a chair or counter. Using the chair or counter as a support to help you, raise your body up onto your toes and hold for 5 seconds. Then slowly lower yourself down without holding onto the support. (It's OK to keep holding onto the support if you need to.) When this exercise becomes less painful, try lowering yourself down on the injured leg only. Repeat 15 times. Do 2 sets of 15. Rest 30 seconds between sets.   Step-up: Stand with the foot of your injured leg on a support 3 to 5 inches high (like a small step or block of wood). Keep your other foot flat on the floor. Shift your weight onto the injured leg on the support. Straighten your injured leg as the other leg comes off the floor. Return to the starting position by bending your injured leg and slowly lowering your uninjured leg back to the floor. Do 2 sets of 15.   Resisted ankle eversion: Sit with both legs stretched out in front of you, with your feet about a shoulder's width apart. Tie a loop in one end of elastic tubing. Put the foot of your injured leg through the loop so that the tubing goes around the arch of that foot and wraps around the outside of the other foot. Hold onto the other end of the tubing with your hand to provide tension. Turn the foot of  your injured leg up and out. Make sure you keep your other foot still so that it will allow the tubing to stretch as you move the foot of your injured leg. Return to the starting position. Do 2 sets of 15.   Balance and reach exercises: Stand next to a chair with your injured leg farther from the chair. The chair will provide support if you need it. Stand on the foot of your injured leg and bend your knee slightly. Try to raise the arch of this foot while keeping your big toe on the floor. Keep your foot in this position. With the hand that is farther away from the chair, reach forward in front of you by bending at the waist. Avoid bending your knee any more as you do this. Repeat this 10 times. To make the exercise more challenging, reach farther in front of you. Do 2 sets of 10.  the same position as above. While keeping your arch height, reach the hand that is farther away from the chair across your body toward the chair. The farther you reach, the more challenging the exercise. Do 2 sets of 10.     Resisted ankle eversion: Sit with both legs stretched out in front of you, with your feet about a shoulder's width apart. Tie a loop in one end of elastic tubing. Put the foot of your injured leg through the loop so that the tubing goes around the arch of that foot and wraps around the outside of the other foot. Hold onto the other end of the tubing with your hand to provide tension. Turn the foot of your injured leg up and out. Make sure you keep your other foot still so that it will allow the tubing to stretch as you move the foot of your injured leg. Return to the starting position. Do 2 sets of 15.   If you have access to a wobble board, do the following exercises:  Wobble board exercises:   Stand on a wobble board with your feet shoulder width apart. Rock the board forwards and backwards 30 times, then side to side 30 times. Hold on to a chair if you need support.   Rotate the wobble board around so that  the edge of the board is in contact with the floor at all times. Do this 30 times in a clockwise and then a counterclockwise direction.   Balance on the wobble board for as long as you can without letting the edges touch the floor. Try to do this for 2 minutes without touching the floor.   Rotate the wobble board in clockwise and counterclockwise circles, but do not let the edge of the board touch the floor.   When you have mastered exercises A through D, try repeating them while standing on just your injured leg.   After you are able to do these exercises on one leg, try to do them with your eyes closed. Make sure you have something nearby to support you in case you lose your balance.        BODY WEIGHT AND YOUR FEET  The following information is included in the after visit summary for all patients. Body weight can be a sensitive issue to discuss in clinic, but we think the following information is very important. Although we focus on the feet and ankles, we do support the overall health of our patients.     Many things can cause foot and ankle problems. Foot structure, activity level, foot mechanics and injuries are common causes of pain. One very important issue that often goes unmentioned, is body weight. Extra weight can cause increased stress on muscles, ligaments, bones and tendons. Sometimes just a few extra pounds is all it takes to put one over her/his threshold. Without reducing that stress, it can be difficult to alleviate pain. As Foot & Ankle specialists, our job is addressing the lower extremity problem and possible causes. Regarding extra body weight, we encourage patients to discuss diet and weight management plans with their primary care doctors. It is this team approach that gives you the best opportunity for pain relief and getting you back on your feet.      [facility] has a Comprehensive Weight Management Program. This program includes counseling, education, non-surgical and surgical approaches  to weight loss. If you are interested in learning more either talk to you primary care provider or call [phone].

## 2019-04-11 ENCOUNTER — ONCOLOGY VISIT (OUTPATIENT)
Dept: ONCOLOGY | Facility: CLINIC | Age: 64
End: 2019-04-11
Attending: INTERNAL MEDICINE
Payer: COMMERCIAL

## 2019-04-11 VITALS
RESPIRATION RATE: 18 BRPM | BODY MASS INDEX: 23.4 KG/M2 | TEMPERATURE: 97.5 F | WEIGHT: 154.4 LBS | HEIGHT: 68 IN | DIASTOLIC BLOOD PRESSURE: 69 MMHG | OXYGEN SATURATION: 98 % | SYSTOLIC BLOOD PRESSURE: 113 MMHG | HEART RATE: 70 BPM

## 2019-04-11 DIAGNOSIS — C50.919 MALIGNANT NEOPLASM OF BREAST IN FEMALE, ESTROGEN RECEPTOR POSITIVE, UNSPECIFIED LATERALITY, UNSPECIFIED SITE OF BREAST (H): Primary | ICD-10-CM

## 2019-04-11 DIAGNOSIS — Z17.0 MALIGNANT NEOPLASM OF BREAST IN FEMALE, ESTROGEN RECEPTOR POSITIVE, UNSPECIFIED LATERALITY, UNSPECIFIED SITE OF BREAST (H): Primary | ICD-10-CM

## 2019-04-11 PROCEDURE — 99213 OFFICE O/P EST LOW 20 MIN: CPT | Performed by: INTERNAL MEDICINE

## 2019-04-11 PROCEDURE — G0463 HOSPITAL OUTPT CLINIC VISIT: HCPCS

## 2019-04-11 ASSESSMENT — MIFFLIN-ST. JEOR: SCORE: 1298.85

## 2019-04-11 ASSESSMENT — PAIN SCALES - GENERAL: PAINLEVEL: MILD PAIN (2)

## 2019-04-11 NOTE — PROGRESS NOTES
Visit Date:   04/11/2019      HISTORY OF PRESENT ILLNESS:  Jack, 63-year-old female who comes in today for interim followup.  She has a diagnosis of left-sided breast cancer that was diagnosed in 09/2011, so she will be coming up on 8 years out from her diagnosis.  She has done well from a breast cancer standpoint, she is planning to continue on Arimidex for another couple of years.  Her bone density was stable as of 01/2018.  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  She is in very good health.  She denies today, cough, shortness of breath, bone pain, or any worrisome symptomatology.      REVIEW OF SYSTEMS:  A 14-point comprehensive review of systems is unremarkable.      MEDICATIONS:  As charted.      ALLERGIES:  AS CHARTED.      PHYSICAL EXAMINATION:  She is a well-appearing lady in no acute distress.   VITAL SIGNS:  Stable.   HEENT:  Oropharynx clear, mucous membranes moist.   NECK:  Has no masses or goiter.   LYMPH:  There is no cervical, supraclavicular or infraclavicular adenopathy.   CHEST:  Clear to auscultation and percussion bilaterally.   HEART:  S1, S2 normal.  No added sounds or murmurs.   BREASTS:  She is status post right-sided lumpectomy, the scar looks good, no further palpable masses.  Right breast normal, no palpable masses.  Right and left axilla negative.      DATA REVIEW:  She did have lab work done with her primary care physician in October.  I have not repeated that today.      IMPRESSION:  Patient with a history of left-sided breast cancer diagnosed in 2011, status post left-sided lumpectomy and currently on active treatment with adjuvant Arimidex.  She is going to continue the Arimidex as long as her bone density tolerates it.  She now sees me on a once-a-year basis.  We will repeat the bone density again in 2020.         NESS MARCELINO MD             D: 04/11/2019   T: 04/11/2019    MT: CHRISTIANO      Name:     DONATO FLORES   MRN:      [id number]        Account:      [id number]   :      1955           Visit Date:   2019      Document: [id number]

## 2019-04-11 NOTE — NURSING NOTE
"Oncology Rooming Note    April 11, 2019 10:28 AM   Mago Santiago is a 63 year old female who presents for:    Chief Complaint   Patient presents with     Oncology Clinic Visit     Malignant neoplasm of central portion of left female breast     Initial Vitals: /69   Pulse 70   Temp 97.5  F (36.4  C) (Oral)   Resp 18   Ht 1.727 m (5' 8\")   Wt 70 kg (154 lb 6.4 oz)   SpO2 98%   BMI 23.48 kg/m   Estimated body mass index is 23.48 kg/m  as calculated from the following:    Height as of this encounter: 1.727 m (5' 8\").    Weight as of this encounter: 70 kg (154 lb 6.4 oz). Body surface area is 1.83 meters squared.  Mild Pain (2) Comment: Data Unavailable   No LMP recorded. Patient is postmenopausal.  Allergies reviewed: Yes  Medications reviewed: Yes    Medications: Medication refills not needed today.  Pharmacy name entered into [employer]: [facility] [address] [place], [place] - [address] [facility]  [facility]    Clinical concerns: f/u       Libertad Maria, MAHOGANY              "

## 2019-04-11 NOTE — LETTER
4/11/2019         RE: Mago Santiago  [address] [place] [address]        Dear Colleague,    Thank you for referring your patient, Mago Santiago, to the [facility]. Please see a copy of my visit note below.    Visit Date:   04/11/2019      HISTORY OF PRESENT ILLNESS:  Jack, 63-year-old female who comes in today for interim followup.  She has a diagnosis of left-sided breast cancer that was diagnosed in 09/2011, so she will be coming up on 8 years out from her diagnosis.  She has done well from a breast cancer standpoint, she is planning to continue on Arimidex for another couple of years.  Her bone density was stable as of 01/2018.  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  She is in very good health.  She denies today, cough, shortness of breath, bone pain, or any worrisome symptomatology.      REVIEW OF SYSTEMS:  A 14-point comprehensive review of systems is unremarkable.      MEDICATIONS:  As charted.      ALLERGIES:  AS CHARTED.      PHYSICAL EXAMINATION:  She is a well-appearing lady in no acute distress.   VITAL SIGNS:  Stable.   HEENT:  Oropharynx clear, mucous membranes moist.   NECK:  Has no masses or goiter.   LYMPH:  There is no cervical, supraclavicular or infraclavicular adenopathy.   CHEST:  Clear to auscultation and percussion bilaterally.   HEART:  S1, S2 normal.  No added sounds or murmurs.   BREASTS:  She is status post right-sided lumpectomy, the scar looks good, no further palpable masses.  Right breast normal, no palpable masses.  Right and left axilla negative.      DATA REVIEW:  She did have lab work done with her primary care physician in October.  I have not repeated that today.      IMPRESSION:  Patient with a history of left-sided breast cancer diagnosed in 2011, status post left-sided lumpectomy and currently on active treatment with adjuvant Arimidex.   She is going to continue the Arimidex as long as her bone density tolerates it.  She now sees me on a once-a-year basis.  We will repeat the bone density again in 2020.         NESS BARILLAS MD             D: 2019   T: 2019   MT: CHRISTIANO      Name:     DONATO FLORES   MRN:      [id number]        Account:      [id number]   :      1955           Visit Date:   2019      Document: [id number]       Again, thank you for allowing me to participate in the care of your patient.        Sincerely,        Ness Barillas MD

## 2019-05-03 ENCOUNTER — TRANSFERRED RECORDS (OUTPATIENT)
Dept: HEALTH INFORMATION MANAGEMENT | Facility: CLINIC | Age: 64
End: 2019-05-03

## 2019-06-11 ENCOUNTER — TRANSFERRED RECORDS (OUTPATIENT)
Dept: HEALTH INFORMATION MANAGEMENT | Facility: CLINIC | Age: 64
End: 2019-06-11

## 2019-08-12 ENCOUNTER — OFFICE VISIT (OUTPATIENT)
Dept: ORTHOPEDICS | Facility: CLINIC | Age: 64
End: 2019-08-12
Payer: COMMERCIAL

## 2019-08-12 ENCOUNTER — ANCILLARY PROCEDURE (OUTPATIENT)
Dept: GENERAL RADIOLOGY | Facility: CLINIC | Age: 64
End: 2019-08-12
Attending: FAMILY MEDICINE
Payer: COMMERCIAL

## 2019-08-12 VITALS
DIASTOLIC BLOOD PRESSURE: 80 MMHG | WEIGHT: 158 LBS | SYSTOLIC BLOOD PRESSURE: 110 MMHG | BODY MASS INDEX: 23.95 KG/M2 | HEIGHT: 68 IN
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
## 2019-08-12 DIAGNOSIS — M17.11 PRIMARY OSTEOARTHRITIS OF RIGHT KNEE: ICD-10-CM

## 2019-08-12 DIAGNOSIS — M25.561 CHRONIC PAIN OF RIGHT KNEE: Primary | ICD-10-CM

## 2019-08-12 DIAGNOSIS — G89.29 CHRONIC PAIN OF RIGHT KNEE: Primary | ICD-10-CM

## 2019-08-12 DIAGNOSIS — G89.29 CHRONIC PAIN OF RIGHT KNEE: ICD-10-CM

## 2019-08-12 DIAGNOSIS — M25.561 CHRONIC PAIN OF RIGHT KNEE: ICD-10-CM

## 2019-08-12 PROCEDURE — 99203 OFFICE O/P NEW LOW 30 MIN: CPT | Performed by: FAMILY MEDICINE

## 2019-08-12 PROCEDURE — 73562 X-RAY EXAM OF KNEE 3: CPT | Mod: RT

## 2019-08-12 ASSESSMENT — MIFFLIN-ST. JEOR: SCORE: 1315.18

## 2019-08-12 NOTE — PROGRESS NOTES
"ASSESSMENT & PLAN    1. Chronic pain of right knee    2. Primary osteoarthritis of right knee      Seen for subacute right knee pain after a fall  Has underlying osteoarthritis that was exacerbated by her fall and suspect co-existing degenerative meniscal tearing  Reviewed xray - mostly patellofemoral arthritis  Discussed the progression of arthritis and treatment options  Continue to stay active  Use Aleve 2 tabs twice a day for 7-10 days and ice regularly   Reviewed recent BMP: GFR 60 and in my mind safe to use short course of NSAID  If the knee still feels tight by Friday recommend returning for a cortisone injection and drainage  Ok to add Turmeric tea to your daily regiment    Follow-up if pain prevents you from being successful in therapy.    -----    SUBJECTIVE  Mago Santiago is a/an 63 year old female who is seen as a self referral for evaluation of right knee pain. The patient is seen by themselves.    Onset: 8 month(s) ago. Patient describes injury as slipped and fell in Jan or Feb of this year. Has been dealing with waxing and waning pain since then.   Location of Pain: right knee, anterior and medial  Rating of Pain at worst: 6/10  Rating of Pain Currently: 2/10  Worsened by: walking, stairs, at night  Better with: activity avoidance  Treatments tried: ice and ibuprofen  Associated symptoms: feeling of instability  Orthopedic history: NO  Relevant surgical history: NO  Patient Social History: retired    Patient's past medical, surgical, social, and family histories were reviewed today and no changes are noted.    REVIEW OF SYSTEMS:  10 point ROS is negative other than symptoms noted above in HPI, Past Medical History or as stated below  Constitutional: NEGATIVE for fever, chills, change in weight  Skin: NEGATIVE for worrisome rashes, moles or lesions  GI/: NEGATIVE for bowel or bladder changes  Neuro: NEGATIVE for weakness, dizziness or paresthesias    OBJECTIVE:  /80   Ht 1.727 m (5' 8\")   " Wt 71.7 kg (158 lb)   BMI 24.02 kg/m     General: healthy, alert and in no distress  HEENT: no scleral icterus or conjunctival erythema  Skin: no suspicious lesions or rash. No jaundice.  CV: no pedal edema  Resp: normal respiratory effort without conversational dyspnea   Psych: normal mood and affect  Gait: normal steady gait with appropriate coordination and balance  Neuro: Normal light sensory exam of lower extremity  MSK:  RIGHT KNEE  Inspection:    normal alignment  Palpation:    Tender about the medial patellar facet and medial tibial plateau. Remainder of bony and ligamentous landmarks are nontender.    Mild effusion is present    Patellofemoral crepitus is Present  Range of Motion:     00 extension to 1200 flexion  Strength:    Extensor mechanism intact  Special Tests:    Positive: Patellar grind, Dacia's    Negative: MCL/valgus stress (0 & 30 deg), LCL/varus stress (0 & 30 deg), Lachman's, posterior drawer    Independent visualization of the below image:  KNEE STANDING AP BILATERAL, SUNRISE BILATERAL, LATERAL RIGHT    8/12/2019 12:55 PM      HISTORY:  Chronic pain of right knee.     COMPARISON: None.     FINDINGS: Small patellofemoral osteophytes and joint space narrowing  bilaterally. Small ossicle lateral to the patella likely related to  chronic degeneration or old trauma, bilaterally. Moderate right knee  joint effusion.                                                                      IMPRESSION: Osteoarthrosis of the patellofemoral compartment  bilaterally and moderate right knee joint effusion.     MD Kristin GEORGE, DO [facility]

## 2019-08-12 NOTE — LETTER
8/12/2019         RE: Mago Santiago  [address] [place] [address]        Dear Colleague,    Thank you for referring your patient, Mago Santiago, to the [facility]. Please see a copy of my visit note below.    ASSESSMENT & PLAN    1. Chronic pain of right knee    2. Primary osteoarthritis of right knee      Seen for subacute right knee pain after a fall  Has underlying osteoarthritis that was exacerbated by her fall and suspect co-existing degenerative meniscal tearing  Reviewed xray - mostly patellofemoral arthritis  Discussed the progression of arthritis and treatment options  Continue to stay active  Use Aleve 2 tabs twice a day for 7-10 days and ice regularly   Reviewed recent BMP: GFR 60 and in my mind safe to use short course of NSAID  If the knee still feels tight by Friday recommend returning for a cortisone injection and drainage  Ok to add Turmeric tea to your daily regiment    Follow-up if pain prevents you from being successful in therapy.    -----    SUBJECTIVE  Mago Santiago is a/an 63 year old female who is seen as a self referral for evaluation of right knee pain. The patient is seen by themselves.    Onset: 8 month(s) ago. Patient describes injury as slipped and fell in Jan or Feb of this year. Has been dealing with waxing and waning pain since then.   Location of Pain: right knee, anterior and medial  Rating of Pain at worst: 6/10  Rating of Pain Currently: 2/10  Worsened by: walking, stairs, at night  Better with: activity avoidance  Treatments tried: ice and ibuprofen  Associated symptoms: feeling of instability  Orthopedic history: NO  Relevant surgical history: NO  Patient Social History: retired    Patient's past medical, surgical, social, and family histories were reviewed today and no changes are noted.    REVIEW OF SYSTEMS:  10 point ROS is negative other than symptoms noted above in HPI, Past Medical History or as stated below  Constitutional:  "NEGATIVE for fever, chills, change in weight  Skin: NEGATIVE for worrisome rashes, moles or lesions  GI/: NEGATIVE for bowel or bladder changes  Neuro: NEGATIVE for weakness, dizziness or paresthesias    OBJECTIVE:  /80   Ht 1.727 m (5' 8\")   Wt 71.7 kg (158 lb)   BMI 24.02 kg/m      General: healthy, alert and in no distress  HEENT: no scleral icterus or conjunctival erythema  Skin: no suspicious lesions or rash. No jaundice.  CV: no pedal edema  Resp: normal respiratory effort without conversational dyspnea   Psych: normal mood and affect  Gait: normal steady gait with appropriate coordination and balance  Neuro: Normal light sensory exam of lower extremity  MSK:  RIGHT KNEE  Inspection:    normal alignment  Palpation:    Tender about the medial patellar facet and medial tibial plateau. Remainder of bony and ligamentous landmarks are nontender.    Mild effusion is present    Patellofemoral crepitus is Present  Range of Motion:     00 extension to 1200 flexion  Strength:    Extensor mechanism intact  Special Tests:    Positive: Patellar grind, Dacia's    Negative: MCL/valgus stress (0 & 30 deg), LCL/varus stress (0 & 30 deg), Lachman's, posterior drawer    Independent visualization of the below image:  KNEE STANDING AP BILATERAL, SUNRISE BILATERAL, LATERAL RIGHT    8/12/2019 12:55 PM      HISTORY:  Chronic pain of right knee.     COMPARISON: None.     FINDINGS: Small patellofemoral osteophytes and joint space narrowing  bilaterally. Small ossicle lateral to the patella likely related to  chronic degeneration or old trauma, bilaterally. Moderate right knee  joint effusion.                                                                      IMPRESSION: Osteoarthrosis of the patellofemoral compartment  bilaterally and moderate right knee joint effusion.     MD Kristin GEORGE, DO [facility]      Again, thank you for allowing me to participate in the " care of your patient.        Sincerely,        Kristin Solo, DO

## 2019-08-12 NOTE — PATIENT INSTRUCTIONS
1. Chronic pain of right knee    2. Primary osteoarthritis of right knee      Reviewed xray - arthritis mostly behind your kneecap  Discussed the progression of arthritis and treatment options  Goal is to keep you active  Use Aleve 2 tabs twice a day for 7-10 days and ice regularly   If the knee still feels tight by Friday recommend returning for a cortisone injection and drainage  Ok to add Turmeric tea to your daily regiment    Follow-up if pain / stiffness does not improve

## 2019-08-26 ENCOUNTER — OFFICE VISIT (OUTPATIENT)
Dept: ORTHOPEDICS | Facility: CLINIC | Age: 64
End: 2019-08-26
Payer: COMMERCIAL

## 2019-08-26 DIAGNOSIS — M17.11 PRIMARY OSTEOARTHRITIS OF RIGHT KNEE: Primary | ICD-10-CM

## 2019-08-26 PROCEDURE — 20611 DRAIN/INJ JOINT/BURSA W/US: CPT | Mod: RT | Performed by: FAMILY MEDICINE

## 2019-08-26 RX ORDER — ROPIVACAINE HYDROCHLORIDE 5 MG/ML
3 INJECTION, SOLUTION EPIDURAL; INFILTRATION; PERINEURAL
Status: DISCONTINUED | OUTPATIENT
Start: 2019-08-26 | End: 2020-05-13

## 2019-08-26 RX ORDER — METHYLPREDNISOLONE ACETATE 40 MG/ML
40 INJECTION, SUSPENSION INTRA-ARTICULAR; INTRALESIONAL; INTRAMUSCULAR; SOFT TISSUE
Status: DISCONTINUED | OUTPATIENT
Start: 2019-08-26 | End: 2020-05-13

## 2019-08-26 RX ADMIN — METHYLPREDNISOLONE ACETATE 40 MG: 40 INJECTION, SUSPENSION INTRA-ARTICULAR; INTRALESIONAL; INTRAMUSCULAR; SOFT TISSUE at 09:42

## 2019-08-26 RX ADMIN — ROPIVACAINE HYDROCHLORIDE 3 ML: 5 INJECTION, SOLUTION EPIDURAL; INFILTRATION; PERINEURAL at 09:42

## 2019-08-26 NOTE — LETTER
8/26/2019         RE: Mago Santiago  [address] [place] [address]        Dear Colleague,    Thank you for referring your patient, Mago Santiago, to the [facility]. Please see a copy of my visit note below.    ASSESSMENT & PLAN    1. Primary osteoarthritis of right knee      Steroid injection of the right knee was performed today in clinic  Can repeat the injection anytime after 4 months    Follow up for repeat injection when pain returns.     -----      SUBJECTIVE:  Mago Santiago is a 64 year old female who is seen in follow-up for right knee pain.They were last seen 8/12/2019 and opted to try OTC meds at that time.  Returns for drainage and cortisone injection.      They indicate that their current pain level is 2/10.  Pain is located right lateral knee and is made worse with down steps/down hill. They have tried ice and Aleve.      The patient is seen by themselves.    Large Joint Injection/Arthocentesis: R knee joint  Date/Time: 8/26/2019 9:42 AM  Performed by: Kristin Solo DO  Authorized by: Kristin Solo DO     Indications:  Osteoarthritis, pain and joint swelling  Needle Size:  22 G  Guidance: ultrasound    Approach:  Superolateral  Location:  Knee      Medications:  40 mg methylPREDNISolone 40 MG/ML; 3 mL ropivacaine 5 MG/ML  Aspirate amount (mL):  16  Aspirate:  Serous  Outcome:  Tolerated well, no immediate complications  Procedure discussed: discussed risks, benefits, and alternatives    Consent Given by:  Patient  Timeout: timeout called immediately prior to procedure    Prep: patient was prepped and draped in usual sterile fashion       Pain noted to be a 2/10 before completion of the procedure and 2/10 after completion of the procedure.            Kristin Solo DO [facility]        Again, thank you for allowing me to participate in the care of your patient.        Sincerely,        Kristin Solo DO

## 2019-08-26 NOTE — PROGRESS NOTES
ASSESSMENT & PLAN    1. Primary osteoarthritis of right knee      Steroid injection of the right knee was performed today in clinic  Can repeat the injection anytime after 4 months    Follow up for repeat injection when pain returns.     -----      SUBJECTIVE:  Mago Santiago is a 64 year old female who is seen in follow-up for right knee pain.They were last seen 8/12/2019 and opted to try OTC meds at that time.  Returns for drainage and cortisone injection.      They indicate that their current pain level is 2/10.  Pain is located right lateral knee and is made worse with down steps/down hill. They have tried ice and Aleve.      The patient is seen by themselves.    Large Joint Injection/Arthocentesis: R knee joint  Date/Time: 8/26/2019 9:42 AM  Performed by: Kristin Solo DO  Authorized by: Kristin Solo DO     Indications:  Osteoarthritis, pain and joint swelling  Needle Size:  22 G  Guidance: ultrasound    Approach:  Superolateral  Location:  Knee      Medications:  40 mg methylPREDNISolone 40 MG/ML; 3 mL ropivacaine 5 MG/ML  Aspirate amount (mL):  16  Aspirate:  Serous  Outcome:  Tolerated well, no immediate complications  Procedure discussed: discussed risks, benefits, and alternatives    Consent Given by:  Patient  Timeout: timeout called immediately prior to procedure    Prep: patient was prepped and draped in usual sterile fashion       Pain noted to be a 2/10 before completion of the procedure and 2/10 after completion of the procedure.            Kristin Solo DO CAM  [facility]

## 2019-08-26 NOTE — PATIENT INSTRUCTIONS
1. Primary osteoarthritis of right knee      Steroid injection of the right knee was performed today in clinic    - Would not soak in a hot tub, bath or swimming pool for 48 hours  - Ok to shower  - Ice today and only do your normal amounts of activity  - The lidocaine (what is giving you pain relief right now) will likely stop working in 1-2 hours.  You will then have pain again, similar to before you received the injection. The corticosteroid will not start working until approximately 1-2 weeks from now.  In a small percentage of people, cortisone can cause flushing/redness in the face. This usually lasts for 1-3 days and resolves. Cool compress and Ibuprofen/Tylenol can help if this happens.  Can repeat the injection anytime after 4 months    Follow up for repeat injection when pain returns.

## 2019-09-11 ENCOUNTER — MYC MEDICAL ADVICE (OUTPATIENT)
Dept: ORTHOPEDICS | Facility: CLINIC | Age: 64
End: 2019-09-11

## 2019-09-11 DIAGNOSIS — M25.561 CHRONIC PAIN OF RIGHT KNEE: ICD-10-CM

## 2019-09-11 DIAGNOSIS — M17.11 PRIMARY OSTEOARTHRITIS OF RIGHT KNEE: Primary | ICD-10-CM

## 2019-09-11 DIAGNOSIS — G89.29 CHRONIC PAIN OF RIGHT KNEE: ICD-10-CM

## 2019-10-04 ENCOUNTER — THERAPY VISIT (OUTPATIENT)
Dept: PHYSICAL THERAPY | Facility: CLINIC | Age: 64
End: 2019-10-04
Attending: FAMILY MEDICINE
Payer: COMMERCIAL

## 2019-10-04 DIAGNOSIS — M25.561 KNEE PAIN, RIGHT: ICD-10-CM

## 2019-10-04 DIAGNOSIS — M17.11 PRIMARY OSTEOARTHRITIS OF RIGHT KNEE: ICD-10-CM

## 2019-10-04 DIAGNOSIS — M25.561 CHRONIC PAIN OF RIGHT KNEE: ICD-10-CM

## 2019-10-04 DIAGNOSIS — G89.29 CHRONIC PAIN OF RIGHT KNEE: ICD-10-CM

## 2019-10-04 PROCEDURE — 97110 THERAPEUTIC EXERCISES: CPT | Mod: GP | Performed by: PHYSICAL THERAPIST

## 2019-10-04 PROCEDURE — 97161 PT EVAL LOW COMPLEX 20 MIN: CPT | Mod: GP | Performed by: PHYSICAL THERAPIST

## 2019-10-04 ASSESSMENT — ACTIVITIES OF DAILY LIVING (ADL)
SIT WITH YOUR KNEE BENT: ACTIVITY IS MINIMALLY DIFFICULT
SQUAT: ACTIVITY IS SOMEWHAT DIFFICULT
HOW_WOULD_YOU_RATE_THE_CURRENT_FUNCTION_OF_YOUR_KNEE_DURING_YOUR_USUAL_DAILY_ACTIVITIES_ON_A_SCALE_FROM_0_TO_100_WITH_100_BEING_YOUR_LEVEL_OF_KNEE_FUNCTION_PRIOR_TO_YOUR_INJURY_AND_0_BEING_THE_INABILITY_TO_PERFORM_ANY_OF_YOUR_USUAL_DAILY_ACTIVITIES?: 90
KNEEL ON THE FRONT OF YOUR KNEE: ACTIVITY IS FAIRLY DIFFICULT
WALK: ACTIVITY IS MINIMALLY DIFFICULT
GIVING WAY, BUCKLING OR SHIFTING OF KNEE: THE SYMPTOM AFFECTS MY ACTIVITY SLIGHTLY
RISE FROM A CHAIR: ACTIVITY IS MINIMALLY DIFFICULT
LIMPING: I DO NOT HAVE THE SYMPTOM
RAW_SCORE: 50
HOW_WOULD_YOU_RATE_THE_OVERALL_FUNCTION_OF_YOUR_KNEE_DURING_YOUR_USUAL_DAILY_ACTIVITIES?: NEARLY NORMAL
STIFFNESS: THE SYMPTOM AFFECTS MY ACTIVITY SLIGHTLY
KNEE_ACTIVITY_OF_DAILY_LIVING_SCORE: 71.43
PAIN: THE SYMPTOM AFFECTS MY ACTIVITY SLIGHTLY
STAND: ACTIVITY IS MINIMALLY DIFFICULT
WEAKNESS: THE SYMPTOM AFFECTS MY ACTIVITY SLIGHTLY
GO DOWN STAIRS: ACTIVITY IS SOMEWHAT DIFFICULT
AS_A_RESULT_OF_YOUR_KNEE_INJURY,_HOW_WOULD_YOU_RATE_YOUR_CURRENT_LEVEL_OF_DAILY_ACTIVITY?: NEARLY NORMAL
SWELLING: I DO NOT HAVE THE SYMPTOM
GO UP STAIRS: ACTIVITY IS MINIMALLY DIFFICULT
KNEE_ACTIVITY_OF_DAILY_LIVING_SUM: 50

## 2019-10-04 NOTE — PROGRESS NOTES
"[facility] Initial Evaluation  Subjective:  The history is provided by the patient. No  was used.   Mago Santiago being seen for knee pain.   Date of Onset: Jan 2019. Where condition occurred: for unknown reasons and at home.Problem occurred: possibly a fall, known OA  and reported as 2/10 (increases to 8/10) on pain scale. General health as reported by patient is excellent. Pertinent medical history includes:  Cancer. Other medical history details: Celiac.    Surgeries include:  Cancer surgery.  Current medications:  Other. Other medications details: Anastrozole.     Pain is described as aching and shooting and is intermittent. Pain is worse in the A.M.. Since onset symptoms are unchanged. Special tests:  X-ray (R patellofemoral OA). Previous treatment includes other (steroid injection). There was mild improvement following previous treatment.   Patient is retired.   Barriers include:  None as reported by patient.  Red flags:  None as reported by patient.  Type of problem:  Right knee   Condition occurred with:  A fall/slip and insidious onset. This is a chronic condition   Problem details: Patient reporting onset of R subpatellar knee pain after falling in Jan 2019. Pain increases descending stairs, hiking, and walking down hills. Steroid injection on 8/26/2019 with little relief. Will be going on hiking trip to [place] in 1 week. Planning day hikes, 4 days..   Patient reports pain:  In the joint and sub patellar. Radiates to:  Lower leg. Associated symptoms:  Other (\"clicking\"). Symptoms are exacerbated by descending stairs, other, bending/squatting, kneeling, transfers and walking (walking down hill, hiking) and relieved by nothing.                      Objective:        Flexibility/Screens:       Lower Extremity:  Decreased left lower extremity flexibility:Quadriceps    Decreased right lower extremity flexibility:  Quadriceps                                        "          Hip Evaluation    Hip Strength:    Flexion:   Left: 5/5   Pain:  Right: 5/5   Pain:                    Extension:  Left: 4/5  Pain:Right: 4-/5    Pain:    Abduction:  Left: 4/5     Pain:Right: 4-/5    Pain:    Internal Rotation:  Left: 5/5    Pain:Right: 5/5   Pain:  External Rotation:  Left: 5-/5   Pain:  Right: 5-/5   Pain:  Knee Flexion:  Left: 5/5   Pain:Right: 5/5   Pain:  Knee Extension:  Left: 5/5   Pain:Right: 5/5    Pain:                 Knee Evaluation:  ROM:    AROM      Extension:  Left: 0    Right:  0  Flexion: Left: 144    Right: 135 painful              Palpation:        Right knee tenderness not present at:  Patellar Medial    Mobility Testing:          Patellofemoral Superior:  Left: hypomobile    Right: hypomobile  Patellofemoral Inferior:  Left: hypomobile    Right: hypomobile  Functional Testing:          Quad:    Single Leg Squat:  Left:       Right:        Bilateral Leg Squat:  Partial  Mild loss of control    Retro Step Up: Left:  8 in ant knee excursion, mild knee valgus    Right: 8 in ant knee excursion, mod knee valgus    % of Uninvolved:           General     ROS    Assessment/Plan:    Patient is a 64 year old female with right side knee complaints.    Patient has the following significant findings with corresponding treatment plan.                Diagnosis 1:  R knee pain  Pain -  hot/cold therapy, manual therapy, splint/taping/bracing/orthotics, education, directional preference exercise and home program  Decreased joint mobility - manual therapy and therapeutic exercise  Decreased strength - therapeutic exercise and therapeutic activities  Decreased proprioception - neuro re-education and therapeutic activities  Impaired muscle performance - neuro re-education  Decreased function - therapeutic activities    Therapy Evaluation Codes:   1) History comprised of:   Personal factors that impact the plan of care:      Age.    Comorbidity factors that impact the plan of care are:       anemia, celiac.     Medications impacting care: anastrozle.  2) Examination of Body Systems comprised of:   Body structures and functions that impact the plan of care:      Knee.   Activity limitations that impact the plan of care are:      Bending, Squatting/kneeling, Stairs, Walking and transfers.  3) Clinical presentation characteristics are:   Stable/Uncomplicated.  4) Decision-Making    Low complexity using standardized patient assessment instrument and/or measureable assessment of functional outcome.  Cumulative Therapy Evaluation is: Low complexity.    Previous and current functional limitations:  (See Goal Flow Sheet for this information)    Short term and Long term goals: (See Goal Flow Sheet for this information)     Communication ability:  Patient appears to be able to clearly communicate and understand verbal and written communication and follow directions correctly.  Treatment Explanation - The following has been discussed with the patient:   RX ordered/plan of care  Anticipated outcomes  Possible risks and side effects  This patient would benefit from PT intervention to resume normal activities.   Rehab potential is good.    Frequency:  1 X week, once daily  Duration:  for 8 weeks  Discharge Plan:  Achieve all LTG.  Independent in home treatment program.  Reach maximal therapeutic benefit.    Please refer to the daily flowsheet for treatment today, total treatment time and time spent performing 1:1 timed codes.

## 2019-10-10 ENCOUNTER — THERAPY VISIT (OUTPATIENT)
Dept: PHYSICAL THERAPY | Facility: CLINIC | Age: 64
End: 2019-10-10
Payer: COMMERCIAL

## 2019-10-10 DIAGNOSIS — M25.561 KNEE PAIN, RIGHT: ICD-10-CM

## 2019-10-10 PROCEDURE — 97110 THERAPEUTIC EXERCISES: CPT | Mod: GP | Performed by: PHYSICAL THERAPIST

## 2019-10-10 PROCEDURE — 97112 NEUROMUSCULAR REEDUCATION: CPT | Mod: GP | Performed by: PHYSICAL THERAPIST

## 2019-10-18 ENCOUNTER — THERAPY VISIT (OUTPATIENT)
Dept: PHYSICAL THERAPY | Facility: CLINIC | Age: 64
End: 2019-10-18
Payer: COMMERCIAL

## 2019-10-18 DIAGNOSIS — M25.561 KNEE PAIN, RIGHT: ICD-10-CM

## 2019-10-18 PROCEDURE — 97112 NEUROMUSCULAR REEDUCATION: CPT | Mod: GP | Performed by: PHYSICAL THERAPIST

## 2019-10-18 PROCEDURE — 97110 THERAPEUTIC EXERCISES: CPT | Mod: GP | Performed by: PHYSICAL THERAPIST

## 2019-10-21 ASSESSMENT — ENCOUNTER SYMPTOMS
SHORTNESS OF BREATH: 0
DIARRHEA: 0
DIZZINESS: 0
ABDOMINAL PAIN: 0
WEAKNESS: 0
FREQUENCY: 0
CHILLS: 0
PALPITATIONS: 0
JOINT SWELLING: 0
EYE PAIN: 0
CONSTIPATION: 0
HEMATOCHEZIA: 0
HEARTBURN: 0
HEMATURIA: 0
NERVOUS/ANXIOUS: 0
MYALGIAS: 0
HEADACHES: 0
ARTHRALGIAS: 0
COUGH: 0
DYSURIA: 0
SORE THROAT: 0
PARESTHESIAS: 0
FEVER: 0
NAUSEA: 0
BREAST MASS: 0

## 2019-10-21 ASSESSMENT — ACTIVITIES OF DAILY LIVING (ADL): CURRENT_FUNCTION: NO ASSISTANCE NEEDED

## 2019-10-22 ENCOUNTER — OFFICE VISIT (OUTPATIENT)
Dept: PEDIATRICS | Facility: CLINIC | Age: 64
End: 2019-10-22
Payer: COMMERCIAL

## 2019-10-22 VITALS
SYSTOLIC BLOOD PRESSURE: 90 MMHG | HEART RATE: 71 BPM | RESPIRATION RATE: 16 BRPM | BODY MASS INDEX: 23.49 KG/M2 | TEMPERATURE: 98 F | HEIGHT: 68 IN | DIASTOLIC BLOOD PRESSURE: 60 MMHG | OXYGEN SATURATION: 100 % | WEIGHT: 155 LBS

## 2019-10-22 DIAGNOSIS — M85.89 OSTEOPENIA OF MULTIPLE SITES: ICD-10-CM

## 2019-10-22 DIAGNOSIS — N18.2 CKD (CHRONIC KIDNEY DISEASE) STAGE 2, GFR 60-89 ML/MIN: ICD-10-CM

## 2019-10-22 DIAGNOSIS — Z00.00 ENCOUNTER FOR MEDICARE ANNUAL WELLNESS EXAM: ICD-10-CM

## 2019-10-22 DIAGNOSIS — Z23 NEED FOR VACCINATION WITH 13-POLYVALENT PNEUMOCOCCAL CONJUGATE VACCINE: Primary | ICD-10-CM

## 2019-10-22 DIAGNOSIS — C50.112 MALIGNANT NEOPLASM OF CENTRAL PORTION OF LEFT FEMALE BREAST, UNSPECIFIED ESTROGEN RECEPTOR STATUS (H): ICD-10-CM

## 2019-10-22 LAB
ANION GAP SERPL CALCULATED.3IONS-SCNC: 6 MMOL/L (ref 3–14)
BUN SERPL-MCNC: 14 MG/DL (ref 7–30)
CALCIUM SERPL-MCNC: 9.6 MG/DL (ref 8.5–10.1)
CHLORIDE SERPL-SCNC: 107 MMOL/L (ref 94–109)
CHOLEST SERPL-MCNC: 173 MG/DL
CO2 SERPL-SCNC: 27 MMOL/L (ref 20–32)
CREAT SERPL-MCNC: 0.82 MG/DL (ref 0.52–1.04)
GFR SERPL CREATININE-BSD FRML MDRD: 76 ML/MIN/{1.73_M2}
GLUCOSE SERPL-MCNC: 86 MG/DL (ref 70–99)
HDLC SERPL-MCNC: 58 MG/DL
LDLC SERPL CALC-MCNC: 91 MG/DL
NONHDLC SERPL-MCNC: 115 MG/DL
POTASSIUM SERPL-SCNC: 4.1 MMOL/L (ref 3.4–5.3)
SODIUM SERPL-SCNC: 140 MMOL/L (ref 133–144)
TRIGL SERPL-MCNC: 121 MG/DL

## 2019-10-22 PROCEDURE — G0009 ADMIN PNEUMOCOCCAL VACCINE: HCPCS | Performed by: INTERNAL MEDICINE

## 2019-10-22 PROCEDURE — G0402 INITIAL PREVENTIVE EXAM: HCPCS | Performed by: INTERNAL MEDICINE

## 2019-10-22 PROCEDURE — 36415 COLL VENOUS BLD VENIPUNCTURE: CPT | Performed by: INTERNAL MEDICINE

## 2019-10-22 PROCEDURE — 90670 PCV13 VACCINE IM: CPT | Performed by: INTERNAL MEDICINE

## 2019-10-22 PROCEDURE — 80061 LIPID PANEL: CPT | Performed by: INTERNAL MEDICINE

## 2019-10-22 PROCEDURE — 90662 IIV NO PRSV INCREASED AG IM: CPT | Performed by: INTERNAL MEDICINE

## 2019-10-22 PROCEDURE — 80048 BASIC METABOLIC PNL TOTAL CA: CPT | Performed by: INTERNAL MEDICINE

## 2019-10-22 PROCEDURE — G0008 ADMIN INFLUENZA VIRUS VAC: HCPCS | Performed by: INTERNAL MEDICINE

## 2019-10-22 ASSESSMENT — ENCOUNTER SYMPTOMS
HEARTBURN: 0
DIARRHEA: 0
HEMATOCHEZIA: 0
MYALGIAS: 0
FREQUENCY: 0
COUGH: 0
NAUSEA: 0
FEVER: 0
SHORTNESS OF BREATH: 0
NERVOUS/ANXIOUS: 0
HEMATURIA: 0
BREAST MASS: 0
PALPITATIONS: 0
DYSURIA: 0
SORE THROAT: 0
EYE PAIN: 0
CHILLS: 0
HEADACHES: 0
ARTHRALGIAS: 0
WEAKNESS: 0
JOINT SWELLING: 0
ABDOMINAL PAIN: 0
CONSTIPATION: 0
DIZZINESS: 0
PARESTHESIAS: 0

## 2019-10-22 ASSESSMENT — MIFFLIN-ST. JEOR: SCORE: 1292.09

## 2019-10-22 ASSESSMENT — ACTIVITIES OF DAILY LIVING (ADL): CURRENT_FUNCTION: NO ASSISTANCE NEEDED

## 2019-10-22 NOTE — PROGRESS NOTES
"SUBJECTIVE:   Mago Santiago is a 64 year old female who presents for Preventive Visit.  Are you in the first 12 months of your Medicare coverage?  Yes,  Visual Acuity:  Right Eye: 20/63   Left Eye: 20/25  Both Eyes: 20/25    Healthy Habits:     In general, how would you rate your overall health?  Good    Frequency of exercise:  6-7 days/week    Duration of exercise:  45-60 minutes    Do you usually eat at least 4 servings of fruit and vegetables a day, include whole grains    & fiber and avoid regularly eating high fat or \"junk\" foods?  Yes    Taking medications regularly:  Yes    Medication side effects:  Not applicable    Ability to successfully perform activities of daily living:  No assistance needed    Home Safety:  No safety concerns identified    Hearing Impairment:  No hearing concerns    In the past 6 months, have you been bothered by leaking of urine?  No    In general, how would you rate your overall mental or emotional health?  Excellent      PHQ-2 Total Score: 0    Additional concerns today:  No    Do you feel safe in your environment? Yes    Do you have a Health Care Directive? Yes: Advance Directive has been received and scanned.      Right Ear:      1000 Hz RESPONSE- on Level: 40 db (Conditioning sound)   1000 Hz: RESPONSE- on Level:   20 db    2000 Hz: RESPONSE- on Level:   20 db    4000 Hz: RESPONSE- on Level:   20 db     Left Ear:      4000 Hz: RESPONSE- on Level: tone not heard   2000 Hz: RESPONSE- on Level:   20 db    1000 Hz: RESPONSE- on Level:   20 db     500 Hz: RESPONSE- on Level: 25 db    Right Ear:    500 Hz: RESPONSE- on Level: 25 db    Hearing Acuity: REFER if symptomatic.     Hearing Assessment: abnormal--refer to audiology if symptomatic  Fall risk  Fallen 2 or more times in the past year?: No  Any fall with injury in the past year?: No    Cognitive Screening   1) Repeat 3 items (Leader, Season, Table)    2) Clock draw: NORMAL  3) 3 item recall: Recalls 3 objects  Results: 3 items " recalled: COGNITIVE IMPAIRMENT LESS LIKELY    Mini-CogTM Copyright RICHARD Arias. Licensed by the author for use in [facility]; reprinted with permission (sabino@[facility]). All rights reserved.      Do you have sleep apnea, excessive snoring or daytime drowsiness?: no    Reviewed and updated as needed this visit by clinical staff  Tobacco  Allergies  Meds         Reviewed and updated as needed this visit by Provider  Meds        Social History     Tobacco Use     Smoking status: Never Smoker     Smokeless tobacco: Never Used   Substance Use Topics     Alcohol use: Yes     Alcohol/week: 0.0 standard drinks     Frequency: 2-3 times a week     Drinks per session: 1 or 2     Binge frequency: Never     Comment: Seldom     If you drink alcohol do you typically have >3 drinks per day or >7 drinks per week? No    Alcohol Use 10/21/2019   Prescreen: >3 drinks/day or >7 drinks/week? No           Current providers sharing in care for this patient include:   Patient Care Team:  Hanny Kunz MD as PCP - General (Internal Medicine)  Joyce Barillas MD as MD (Hematology)  Karena Tafoya RN as Continuity Care Coordinator  Hanny Kunz MD as Assigned PCP    The following health maintenance items are reviewed in Epic and correct as of today:  Health Maintenance   Topic Date Due     ANNUAL REVIEW OF HM ORDERS  1955     FALL RISK ASSESSMENT  08/24/2019     PNEUMOCOCCAL IMMUNIZATION 65+ HIGH/HIGHEST RISK (1 of 2 - PCV13) 08/24/2019     INFLUENZA VACCINE (1) 09/01/2019     ZOSTER IMMUNIZATION (3 of 3) 04/09/2019     MEDICARE ANNUAL WELLNESS VISIT  10/08/2019     ADVANCE CARE PLANNING  11/03/2020     MAMMO SCREENING  02/12/2021     LIPID  10/08/2023     DTAP/TDAP/TD IMMUNIZATION (2 - Td) 07/03/2025     COLONOSCOPY  05/03/2029     DEXA  Completed     HEPATITIS C SCREENING  Completed     HIV SCREENING  Completed     PHQ-2  Completed     IPV IMMUNIZATION  Aged Out     MENINGITIS IMMUNIZATION  Aged Out  "    Patient reports she has been dealing with sinus pressure/congestion for past 5 days or so. No fevers or chills. Taking advil and using netipot.       Review of Systems   Constitutional: Negative for chills and fever.   HENT: Negative for congestion, ear pain, hearing loss and sore throat.    Eyes: Negative for pain and visual disturbance.   Respiratory: Negative for cough and shortness of breath.    Cardiovascular: Negative for chest pain, palpitations and peripheral edema.   Gastrointestinal: Negative for abdominal pain, constipation, diarrhea, heartburn, hematochezia and nausea.   Breasts:  Negative for tenderness, breast mass and discharge.   Genitourinary: Negative for dysuria, frequency, genital sores, hematuria, pelvic pain, urgency, vaginal bleeding and vaginal discharge.   Musculoskeletal: Negative for arthralgias, joint swelling and myalgias.   Skin: Negative for rash.   Neurological: Negative for dizziness, weakness, headaches and paresthesias.   Psychiatric/Behavioral: Negative for mood changes. The patient is not nervous/anxious.          OBJECTIVE:   BP 90/60 (BP Location: Right arm, Patient Position: Sitting, Cuff Size: Adult Regular)   Pulse 71   Temp 98  F (36.7  C) (Tympanic)   Resp 16   Ht 1.72 m (5' 7.72\")   Wt 70.3 kg (155 lb)   SpO2 100%   BMI 23.77 kg/m   Estimated body mass index is 23.77 kg/m  as calculated from the following:    Height as of this encounter: 1.72 m (5' 7.72\").    Weight as of this encounter: 70.3 kg (155 lb).  Physical Exam  GENERAL: healthy, alert and no distress  EYES: Eyes grossly normal to inspection, PERRL and conjunctivae and sclerae normal  HENT: ear canals and TM's normal, nose and mouth without ulcers or lesions. Mild bilateral maxillary sinus tenderness  NECK: no adenopathy, no asymmetry, masses, or scars and thyroid normal to palpation  RESP: lungs clear to auscultation - no rales, rhonchi or wheezes  BREAST: deferred  CV: regular rate and rhythm, normal " "S1 S2, no S3 or S4, no murmur, click or rub, no peripheral edema and peripheral pulses strong  ABDOMEN: soft, nontender, no hepatosplenomegaly, no masses and bowel sounds normal  MS: no gross musculoskeletal defects noted, no edema  SKIN: no suspicious lesions or rashes  NEURO: Normal strength and tone, mentation intact and speech normal  PSYCH: mentation appears normal, affect normal/bright        ASSESSMENT / PLAN:   1. Encounter for Medicare annual wellness exam  Counseling as below, otherwise doing well and no concerns.   - Lipid panel reflex to direct LDL Fasting  - Basic metabolic panel  (Ca, Cl, CO2, Creat, Gluc, K, Na, BUN)    2. Malignant neoplasm of central portion of left female breast, unspecified estrogen receptor status (H)  Follows with Oncology annually, on Arimidex. Gets mammo, DEXA through Onc. Does have osteopenia, planning to repeat every 3 years.     3. CKD (chronic kidney disease) stage 2, GFR 60-89 ml/min  Will check BMP today. Does have borderline renal function based on prior labs.       End of Life Planning:  Patient currently has an advanced directive: No.  I have verified the patient's ablity to prepare an advanced directive/make health care decisions.  Literature was provided to assist patient in preparing an advanced directive.    COUNSELING:  Reviewed preventive health counseling, as reflected in patient instructions       Regular exercise       Healthy diet/nutrition       Vision screening       Hearing screening       Osteoporosis Prevention/Bone Health       Colon cancer screening    Estimated body mass index is 24.02 kg/m  as calculated from the following:    Height as of 8/12/19: 1.727 m (5' 8\").    Weight as of 8/12/19: 71.7 kg (158 lb).         reports that she has never smoked. She has never used smokeless tobacco.      Appropriate preventive services were discussed with this patient, including applicable screening as appropriate for cardiovascular disease, diabetes, " osteopenia/osteoporosis, and glaucoma.  As appropriate for age/gender, discussed screening for colorectal cancer, prostate cancer, breast cancer, and cervical cancer. Checklist reviewing preventive services available has been given to the patient.    Reviewed patients plan of care and provided an AVS. The Basic Care Plan (routine screening as documented in Health Maintenance) for Mago meets the Care Plan requirement. This Care Plan has been established and reviewed with the Patient.    Counseling Resources:  ATP IV Guidelines  Pooled Cohorts Equation Calculator  Breast Cancer Risk Calculator  FRAX Risk Assessment  ICSI Preventive Guidelines  Dietary Guidelines for Americans, 2010  USDA's MyPlate  ASA Prophylaxis  Lung CA Screening    Hanny Nava MD  [facility]    Identified Health Risks:

## 2019-10-22 NOTE — PATIENT INSTRUCTIONS
Message if sinus symptoms not improved by next Monday.    Vaccines today.     Fasting labs today. We will recheck your kidney function.    Patient Education   Personalized Prevention Plan  You are due for the preventive services outlined below.  Your care team is available to assist you in scheduling these services.  If you have already completed any of these items, please share that information with your care team to update in your medical record.  Health Maintenance Due   Topic Date Due     ANNUAL REVIEW OF HM ORDERS  1955     FALL RISK ASSESSMENT  08/24/2019     Pneumococcal Vaccine (1 of 2 - PCV13) 08/24/2019     Flu Vaccine (1) 09/01/2019     Zoster (Shingles) Vaccine (3 of 3) 04/09/2019     Annual Wellness Visit  10/08/2019

## 2019-10-28 ENCOUNTER — THERAPY VISIT (OUTPATIENT)
Dept: PHYSICAL THERAPY | Facility: CLINIC | Age: 64
End: 2019-10-28
Payer: COMMERCIAL

## 2019-10-28 DIAGNOSIS — M25.561 KNEE PAIN, RIGHT: ICD-10-CM

## 2019-10-28 PROCEDURE — 97112 NEUROMUSCULAR REEDUCATION: CPT | Mod: GP | Performed by: PHYSICAL THERAPIST

## 2019-10-28 PROCEDURE — 97110 THERAPEUTIC EXERCISES: CPT | Mod: GP | Performed by: PHYSICAL THERAPIST

## 2019-11-20 ENCOUNTER — MYC REFILL (OUTPATIENT)
Dept: ONCOLOGY | Facility: CLINIC | Age: 64
End: 2019-11-20

## 2019-11-20 DIAGNOSIS — C50.112 MALIGNANT NEOPLASM OF CENTRAL PORTION OF LEFT FEMALE BREAST, UNSPECIFIED ESTROGEN RECEPTOR STATUS (H): ICD-10-CM

## 2019-11-25 RX ORDER — ANASTROZOLE 1 MG/1
1 TABLET ORAL DAILY
Qty: 90 TABLET | Refills: 3 | Status: SHIPPED | OUTPATIENT
Start: 2019-11-25 | End: 2020-12-30

## 2020-01-17 PROBLEM — M25.561 KNEE PAIN, RIGHT: Status: RESOLVED | Noted: 2019-10-04 | Resolved: 2020-01-17

## 2020-01-17 NOTE — PROGRESS NOTES
Discharge Note    Progress reporting period is from initial evaluation date (please see noted date below) to Oct 28, 2019.  Linked Episodes   Type: Episode: Status: Noted: Resolved: Last update: Updated by:   PHYSICAL THERAPY R knee 10/4/2019 Active 10/4/2019  10/28/2019  7:58 AM Kelli Pozo PT      Comments:       Mago failed to follow up and current status is unknown.  Please see information below for last relevant information on current status.  Patient seen for 4 visits.    SUBJECTIVE  Subjective changes noted by patient:  Knee is doing well, exercises are going well. [facility] had to [facility] a few positions because of discomfort. Hasn't had to tape this week.  .  Current pain level is  .     Previous pain level was  2/10.   Changes in function:  Yes (See Goal flowsheet attached for changes in current functional level)  Adverse reaction to treatment or activity: None    OBJECTIVE  Changes noted in objective findings:       ASSESSMENT/PLAN  Diagnosis: R knee pain   Updated problem list and treatment plan:   Impaired muscle performance - HEP  STG/LTGs have been met or progress has been made towards goals:  Yes, please see goal flowsheet for most current information  Assessment of Progress: current status is unknown.    Last current status:     Self Management Plans:  HEP  I have re-evaluated this patient and find that the nature, scope, duration and intensity of the therapy is appropriate for the medical condition of the patient.  Mago continues to require the following intervention to meet STG and LTG's:  HEP.    Recommendations:  Discharge with current home program.  Patient to follow up with MD as needed.    Please refer to the daily flowsheet for treatment today, total treatment time and time spent performing 1:1 timed codes.

## 2020-01-27 ENCOUNTER — OFFICE VISIT (OUTPATIENT)
Dept: PODIATRY | Facility: CLINIC | Age: 65
End: 2020-01-27
Payer: COMMERCIAL

## 2020-01-27 VITALS
SYSTOLIC BLOOD PRESSURE: 118 MMHG | TEMPERATURE: 97.5 F | HEART RATE: 59 BPM | BODY MASS INDEX: 23.77 KG/M2 | DIASTOLIC BLOOD PRESSURE: 63 MMHG | WEIGHT: 155 LBS | OXYGEN SATURATION: 100 %

## 2020-01-27 DIAGNOSIS — L84 CALLUS: Primary | ICD-10-CM

## 2020-01-27 DIAGNOSIS — M21.622 TAILOR'S BUNIONETTE, LEFT: ICD-10-CM

## 2020-01-27 PROCEDURE — 99214 OFFICE O/P EST MOD 30 MIN: CPT | Performed by: PODIATRIST

## 2020-01-27 NOTE — PROGRESS NOTES
"Foot & Ankle Surgery   January 27, 2020    S:  Pt is seen today for evaluation of multiple issues.  Pain L 5th MPJ, saw Dr Morocho for this April 2019.  Can be sore with pressure, such as shoes.  Has calluses on hallux/MPJ and sub 2nd MPJ R foot, as well as lateral R 4th toe.  Also sstates the bottoms of the feet can be sore, like \"standing on bone\".  Likes to hike, takes hiking trips.    Vitals:    01/27/20 0835   BP: 118/63   BP Location: Right arm   Cuff Size: Adult Regular   Pulse: 59   Temp: 97.5  F (36.4  C)   TempSrc: Tympanic   SpO2: 100%   Weight: 70.3 kg (155 lb)   '      ROS - Pos for CC.  Patient denies current nausea, vomiting, chills, fevers, belly pain, calf pain, chest pain or SOB.  Complete remainder of ROS it otherwise neg.      PE:  Gen:   No apparent distress  Eye:    Visual scanning without deficit  Ear:    Response to auditory stimuli wnl  Lung:    Non-labored breathing on RA noted  Abd:    NTND per patient report  Lymph:    Neg for pitting/non-pitting edema BLE  Vasc:    Pulses palpable, CFT minimally delayed  Neuro:    Light touch sensation intact to all sensory nerve distributions without paresthesias  Derm:    Mild callusing plantar medial hallux/MPJ bilateral, and small chain of calluses sub 2nd MPJ R, with some peeling skin lateral R 4th toe  MSK:    No specific plantar foot pain today.  Adductovarus R 4th and 5th toes  Calf:    Neg for redness, swelling or tenderness    ssessment:  64 year old female with tailor's bunion left; bilateral foot/toe callusing; plantar foot discomfort      Plan:  Discussed etiologies, anatomy and options  1.  Tailor's bunion left lower extremity   -discussed shoes, padding and RICE/NSAID vs tylenol  -briefly discussed surgery as next step if above plan fails to provide sufficient relief.     -personally reviewed xrays from April 2019 visit with Dr Morocho    2.  Bilateral hallux/1st MPJ and R 4th toe callusing  -We discussed that many hyper-keratotic lesions are " caused by pressure or shearing forces on the skin, and these can often be treated sufficiently with shoes, inserts and local tissue debridement.  Some lesions, however, can have a deep core, and while home treatments are helpful, occasional clinical debridement may be necessary.  In certain cases, surgical excision may be required if no other treatments have proven sufficient.  -Our home instruction handout was dispensed, detailing home therapies to minimize regrowth of the hyperkeratotic tissue.    -advised toe cap/spacer for 4th toe callusing right lower extremity     3.  Bilateral plantar foot pain  -discussed that the plantar fat pad can atrophy and bones can become more prominnent  -OTC insert handout for support/padding  -discussed OTC gel inserts for cushion, as well as custom orthotic option        Follow up:  prn or sooner with acute issues      Body mass index is 23.77 kg/m .           Derick Kaur DPM FACFAS FACFAOM  Podiatric Foot & Ankle Surgeon  [facility]  [phone]

## 2020-01-27 NOTE — PATIENT INSTRUCTIONS
Thank you for choosing [facility] Podiatry / Foot & Ankle Surgery!    DR. MOSLEY'S CLINIC LOCATIONS:   MONDAY - [place] TUESDAY AM - [place]   [address] [facility]  [address]   [place] [address] [place] [address]   [phone]       THURSDAY AM - [place] THURSDAY PM - [place]   [address]   [place] [address] [place] [address]   [phone]       FRIDAY AM - [place] SET UP SURGERY: [phone] [address] APPOINTMENTS: [phone]   [place] [address] BILLING QUESTIONS: [phone] FAX NUMBER: [phone]     Follow Up: prn      FYI: The following information is included in the after visit summary for all patients:  Body weight can be a sensitive issue to discuss in clinic, but we think the following information is very important. Although we focus on the feet and ankles, we do support the overall health of our patients. Many things can cause foot and ankle problems. Foot structure, activity level, foot mechanics and injuries are common causes of pain. One very important issue that often goes unmentioned, is body weight. Extra weight can cause increased stress on muscles, ligaments, bones and tendons. Sometimes just a few extra pounds is all it takes to put one over her/his threshold. Without reducing that stress, it can be difficult to alleviate pain. As Foot & Ankle specialists, our job is addressing the lower extremity problem and possible causes. Regarding extra body weight, we encourage patients to discuss diet and weight management plans with their primary care doctors. It is this team approach that gives you the best opportunity for pain relief and getting you back on your feet. [facility] has a Comprehensive Weight Management Program. This program includes counseling, education, non-surgical and surgical approaches to weight loss. If you are interested in learning more either talk  to you primary care provider or call [phone].      PLANTAR FASCIITIS   What is plantar fasciitis?   Plantar fasciitis is often referred to as heel spurs or heel pain. Plantar fasciitis is a very common problem that affects people of all foot shapes, age, weight and activity level. Pain may be in the arch or on the weight-bearing surface of the heel. The pain maycome on without injury or identifiable cause. Pain is generally present when first getting out of bed in the morning or up from a seated break.   What causes plantar fasciitis?   The plantar fascia is a dense fibrous band of tissue that stretches across the bottom surface of the foot. The fascia helps support the foot muscles and arch. Plantar fasciitis is thought to be caused by mechanical strain or overload. Frequent walking without shoes or wearing unsupportive shoes is thought to cause structural overload and ultimately inflammation of the plantar fascia. Some people have heel spurs that can be seen on x-ray. The heel spur is actually a minor component of plantar fascitis and is largely ignored.   How long will this last?   Plantar fasciitis can last from one day to a lifetime. Some people get intermittent fascitis that is very short-lived. Others suffer daily for years. Excessive body weight, frequent bare foot walking, long hours on the feet, inadequate shoes, predisposing foot structures and excessive activity such as running are all potential issues that lead to chronic and/or recurring plantar fascitis. Having plantar fasciitis means that you are forever prone to this problem and will require modification of some of the above factors. Most people seek treatment within one to four months. Healing usually requires a similar one to four month time frame. Healing time is relative to the amount of effort spent treating the problem.   What can I do?   The easiest solution is to stop walking around your home without shoes. Plantar fasciitis is largely  a shoe problem. Shoes are either not being worn often enough or your current shoes are inadequate for your weight, foot structure or activity level. The majority of shoes on the market today are not sufficient to resist development of plantar fasciitis or to promote healing. Assume that your current shoes are inadequate and will need to be replaced. Even high quality shoes wear out with 6 months to one year of frequent use. Weightloss is another option. Losing ten pounds in the next two months may be enough to resolve the problem. Ice applied to the area of pain two to three times per day for ten minutes each session can be very helpful. This should continue until the problem resolves. Achilles tendon stretching is essential. Stretch multiple times daily to promote healing and to prevent recurrence in the future.     What if this does not help?   Medical treatments often include custom arch supports, cortisone injections, physical therapy, splints to be worn in bed, prescription medications and surgery. The home treatments listed above will be necessary regardless of these advanced medical treatments. Surgery is rarely needed but is very helpful in selected cases.     Heel pain in my future?   Plantar fasciitis is highly recurrent. Risk factors often continue, including return to bare foot walking, inadequate shoes, excessive body weight, excessive activities, etc. Your life style and foot structure may predispose you to recurrent plantar fasciitis. A daily prevention regimen can be very helpful. Ongoing use of shoe inserts, careful attention to appropriate shoes, daily Achilles stretching, etc. may prevent recurrence. Prompt attention at the earliest warning signs of heel pain can resolve the problem in as short as a few days.   Below are some exercises for Plantar fasciitis:  Stair exercise  You can step on the stairs with the ball of your foot and hold your position for at least 15 seconds, then slowly step down  with the heels of your foot. You can do this daily and as often as you want. Plantar fasciitis exercise equipment and handout materials are useful in relieving pain.  Picking the towel  Plantar fasciitis exercise equipment and handout teach you how to exercise by picking the towel. You can sit comfortably and then pick the towel with your toes. Do this at least 10 to 20 times regularly. You can use any object other than a towel as long as the material can be soft and you can pick it up with your toes.  Rolling the bottle or ball  You can get a small ball or bottle and then roll it with your foot. Do this daily for at least 15 to 20 times. Plantar fasciitis exercise equipment and handout are very useful in treating the symptoms of the foot condition.  Stretching the calf  You could lie supine, raise one foot, and then point your toes towards the floor. Do this daily for at least 15 to 20 times. The calf is connected to the heel and the balls of the foot, so you should also exercise this also. Plantar fasciitis exercise equipment and handout usually mentions the importance of exercising the calf also.  Flex the toes  Sit comfortably and then flex your toes by pointing it towards the floor or towards your body. This will relax and flex your foot and exercise your plantar fascia, the calf, and the Achilles tendon. The inability of the foot to stretch often causes the bunching up of the plantar fascia area leading to the pain.  Massaging the calf and the plantar fascia also helps a lot in alleviating the pain and preventing its recurrence. If you prefer standard plantar fasciitis exercise equipment and handout, then you can avail of this from legitimate sources. You can use the standard stretching device, the wheel, and the belt. These are all significant devices in treating the pain in the plantar fascia.    Therapies covered by Dr Kaur today:    1.  Supportive shoes, minimizing barefoot ambulation - helps to  "provide cushion, padding and support to the ligament that is inflamed.  Socks, flip flops, flats and some slippers are not typically sufficient to provide support.  Shoes should be worn even in-doors  2.  Insert/orthotics - inserts/orthotics that have an arch support built in to them provide further stress relief for the ligament.  3. Icing - using a frozen water bottle or orange, and rolling it along the bottom of the arch/heel can help to alleviate discomfort, and can act as a tissue massage to the painful, inflamed ligament.  There is evidence that shows icing at least three times daily can be beneficial  4.  Anti-inflammatory(NSAIDS)/Tylenol - anti-inflammatories, such as ibuprofen or Aleve, as well as Tylenol can be used to help decrease symptoms and improve pain levels.  If you have high blood pressure, heart disease, stomach or kidney problems, use anti-inflammatories sparingly.  Tylenol should not be used if you have liver problems.  If you can safely taken them, you can use NSAIDS and tylenol in combination for pain relief  5. Activity modifications - if there are certain things that you do, whether it's going barefoot or certain shoes/activities, you should try to minimize those activities as much as possible until your symptoms are sufficiently resolved.  Certainly, some activities, such as running on the treadmill, are easier to take a break from versus others, such as work or chores at home.  \"If there are certain activities that hurt your heel, and you keep doing those activities that hurt your heel, your heel will keep hurting\".    6.  Stretching - Stretching your Achilles and hamstring can help to decrease stress on the plantar fascia.                   Hold each stretch for 10 seconds.  Stretch 10 times per set, three sets per day.  Morning, afternoon and evening.  If your heel pain is very severe in the morning, consider doing the first set of stretches before you get out of bed.            If " these initial therapies are insufficient, we have our tier 2 therapies that can more aggressively work to improve your symptoms and get you back to the activities that you enjoy.        OVER THE COUNTER INSERTS    Most of these can be found at your local [employer], [employer], [employer], sporting [employer], or online:    SimpleDealeet   Sofsole Fit Spenco   Power Step   Walk-Fit  ([employer]) Arch Cradles       **A good high quality over the counter insert should cost around $40-$50            Tips for treating painful calluses:    1.  Pumice stone/korey board therapy multiple times weekly to remove callus tissue as it builds up    2.  Good supportive shoes and minimizing barefoot ambulation can slow down callus formation, and can decrease pain levels    3.  Gel inserts can also provide padding to the bottom of the foot, to prevent pain and slow recurrence.    4.  Applying lotion daily/twice daily to the callus tissue can keep it softer and less painful.  Lotions with lactic acid or urea work well, but most lotions are sufficient

## 2020-03-04 ENCOUNTER — ANCILLARY PROCEDURE (OUTPATIENT)
Dept: MAMMOGRAPHY | Facility: CLINIC | Age: 65
End: 2020-03-04
Attending: INTERNAL MEDICINE
Payer: COMMERCIAL

## 2020-03-04 DIAGNOSIS — Z12.31 VISIT FOR SCREENING MAMMOGRAM: ICD-10-CM

## 2020-03-04 PROCEDURE — 77063 BREAST TOMOSYNTHESIS BI: CPT | Mod: TC

## 2020-03-04 PROCEDURE — 77067 SCR MAMMO BI INCL CAD: CPT | Mod: TC

## 2020-05-13 ENCOUNTER — VIRTUAL VISIT (OUTPATIENT)
Dept: ONCOLOGY | Facility: CLINIC | Age: 65
End: 2020-05-13
Attending: INTERNAL MEDICINE
Payer: COMMERCIAL

## 2020-05-13 DIAGNOSIS — C50.112 MALIGNANT NEOPLASM OF CENTRAL PORTION OF LEFT FEMALE BREAST, UNSPECIFIED ESTROGEN RECEPTOR STATUS (H): Primary | ICD-10-CM

## 2020-05-13 PROCEDURE — 99213 OFFICE O/P EST LOW 20 MIN: CPT | Mod: 95 | Performed by: INTERNAL MEDICINE

## 2020-05-13 NOTE — PROGRESS NOTES
"Mago Santiago is a 64 year old female who is being evaluated via a billable video visit.      The patient has been notified of following:     \"This video visit will be conducted via a call between you and your physician/provider. We have found that certain health care needs can be provided without the need for an in-person physical exam.  This service lets us provide the care you need with a video conversation.  If a prescription is necessary we can send it directly to your pharmacy.  If lab work is needed we can place an order for that and you can then stop by our lab to have the test done at a later time.    Video visits are billed at different rates depending on your insurance coverage.  Please reach out to your insurance provider with any questions.    If during the course of the call the physician/provider feels a video visit is not appropriate, you will not be charged for this service.\"    Patient has given verbal consent for Video visit? Yes    How would you like to obtain your AVS? Yasmeen    Patient would like the video invitation sent by: Text to cell phone: [phone]    Will anyone else be joining your video visit? No      Libertad Maria CMA      Video-Visit Details    Type of service:  Video Visit    Video Start Time:   Video End Time:     Originating Location (pt. Location):     Distant Location (provider location):  [facility]     Platform used for Video Visit: jose roberto"

## 2020-05-16 NOTE — PROGRESS NOTES
Visit Date:   05/13/2020      Mago Santiago is a 64-year-old patient is being evaluated today by a video visit due to public health emergency with coronavirus.      LOCATION:  Home.      MD LOCATION:  [facility].        VIDEO VISIT PLATFORM:  [employer].      CONSULTATION TIME: 20 minutes.      HISTORY OF PRESENTING COMPLAINT:  Mago is a 64-year-old patient with a history of left-sided breast cancer.  She was diagnosed in 2011.  She will be coming up on 9 years out from her diagnosis this fall.  She has actually done well from a breast cancer standpoint.  She continues currently on Arimidex and is going to continue that for another year or so.  We did a bone density test in 01/2018 and that looked stable.  Her last mammogram was done in 03/2020.  I reviewed that with her today and that was negative.  She will be due for another bone density at some point in the next 6 months.  She is currently retired from teaching.  She recently had some issues with left wrist tendinitis and had a steroid injection done.  She has no worrisome symptomatology from my standpoint today.      REVIEW OF SYSTEMS:  A 12-point comprehensive review of systems is otherwise unremarkable.  She denies any respiratory, cardiovascular, genitourinary symptomatology.      MEDICATIONS AND ALLERGIES:  OUTLINED IN THE NURSING RECORDS.      PAIN ASSESSMENT:  The patient is in no pain today.      SOCIAL HISTORY:  The patient has a daughter who lives out in [place], also grandchildren who also live in [place].      PHYSICAL EXAMINATION:   GENERAL:  She looks good.  She is in no acute distress.   EYES:  Eyes have no redness or discharge.   SKIN:  Has no rashes or lesions.  She has no labored breathing or cough.   MUSCULOSKELETAL:  She is moving all extremities.   NEUROLOGICAL:  She has no tremors.  She is alert and oriented x 3.     The rest of the video visit exam has been deferred due to public health emergency and video visit restrictions.       IMPRESSION:  This is a patient who is 64 years old with a history of left-sided breast cancer.  She is currently on active treatment with adjuvant Arimidex.  She is going to continue it.        The plan is to repeat the bone density sometime in the next 6 months.  I will see her back in 6 months, hopefully in person, and we will get lab work on return.  I have discussed this with her today and she is in agreement.         NESS MARCELINO MD             D: 05/15/2020   T: 05/15/2020   MT:       Name:     DONATO FLORES   MRN:      [id number]        Account:      [id number]   :      1955           Visit Date:   2020      Document: [id number]

## 2020-11-05 ENCOUNTER — HOSPITAL ENCOUNTER (OUTPATIENT)
Facility: CLINIC | Age: 65
Setting detail: SPECIMEN
Discharge: HOME | End: 2020-11-05
Attending: INTERNAL MEDICINE | Admitting: INTERNAL MEDICINE
Payer: COMMERCIAL

## 2020-11-05 ENCOUNTER — ONCOLOGY VISIT (OUTPATIENT)
Dept: ONCOLOGY | Facility: CLINIC | Age: 65
End: 2020-11-05
Attending: INTERNAL MEDICINE
Payer: COMMERCIAL

## 2020-11-05 VITALS
BODY MASS INDEX: 23.14 KG/M2 | DIASTOLIC BLOOD PRESSURE: 74 MMHG | OXYGEN SATURATION: 98 % | TEMPERATURE: 97.1 F | SYSTOLIC BLOOD PRESSURE: 122 MMHG | WEIGHT: 150.9 LBS | HEART RATE: 71 BPM | RESPIRATION RATE: 16 BRPM

## 2020-11-05 DIAGNOSIS — C50.112 MALIGNANT NEOPLASM OF CENTRAL PORTION OF LEFT FEMALE BREAST, UNSPECIFIED ESTROGEN RECEPTOR STATUS (H): Primary | ICD-10-CM

## 2020-11-05 LAB
ALBUMIN SERPL-MCNC: 3.7 G/DL (ref 3.4–5)
ALP SERPL-CCNC: 70 U/L (ref 40–150)
ALT SERPL W P-5'-P-CCNC: 19 U/L (ref 0–50)
ANION GAP SERPL CALCULATED.3IONS-SCNC: 7 MMOL/L (ref 3–14)
AST SERPL W P-5'-P-CCNC: 10 U/L (ref 0–45)
BILIRUB SERPL-MCNC: 0.4 MG/DL (ref 0.2–1.3)
BUN SERPL-MCNC: 12 MG/DL (ref 7–30)
CALCIUM SERPL-MCNC: 9.2 MG/DL (ref 8.5–10.1)
CHLORIDE SERPL-SCNC: 107 MMOL/L (ref 94–109)
CO2 SERPL-SCNC: 27 MMOL/L (ref 20–32)
CREAT SERPL-MCNC: 0.89 MG/DL (ref 0.52–1.04)
ERYTHROCYTE [DISTWIDTH] IN BLOOD BY AUTOMATED COUNT: 13.3 % (ref 10–15)
GFR SERPL CREATININE-BSD FRML MDRD: 68 ML/MIN/{1.73_M2}
GLUCOSE SERPL-MCNC: 85 MG/DL (ref 70–99)
HCT VFR BLD AUTO: 37.6 % (ref 35–47)
HGB BLD-MCNC: 11.3 G/DL (ref 11.7–15.7)
MCH RBC QN AUTO: 26.9 PG (ref 26.5–33)
MCHC RBC AUTO-ENTMCNC: 30.1 G/DL (ref 31.5–36.5)
MCV RBC AUTO: 90 FL (ref 78–100)
PLATELET # BLD AUTO: 371 10E9/L (ref 150–450)
POTASSIUM SERPL-SCNC: 4.2 MMOL/L (ref 3.4–5.3)
PROT SERPL-MCNC: 7.4 G/DL (ref 6.8–8.8)
RBC # BLD AUTO: 4.2 10E12/L (ref 3.8–5.2)
SODIUM SERPL-SCNC: 141 MMOL/L (ref 133–144)
WBC # BLD AUTO: 4 10E9/L (ref 4–11)

## 2020-11-05 PROCEDURE — G0463 HOSPITAL OUTPT CLINIC VISIT: HCPCS

## 2020-11-05 PROCEDURE — 36415 COLL VENOUS BLD VENIPUNCTURE: CPT

## 2020-11-05 PROCEDURE — 85027 COMPLETE CBC AUTOMATED: CPT | Performed by: INTERNAL MEDICINE

## 2020-11-05 PROCEDURE — 99207 PR NO CHARGE LOS: CPT

## 2020-11-05 PROCEDURE — 80053 COMPREHEN METABOLIC PANEL: CPT | Performed by: INTERNAL MEDICINE

## 2020-11-05 PROCEDURE — 99213 OFFICE O/P EST LOW 20 MIN: CPT | Performed by: INTERNAL MEDICINE

## 2020-11-05 ASSESSMENT — PAIN SCALES - GENERAL: PAINLEVEL: NO PAIN (0)

## 2020-11-05 NOTE — LETTER
"    11/5/2020         RE: Mago Santiago  [address] [place] [address]        Dear Colleague,    Thank you for referring your patient, Mago Santiago, to the [facility]. Please see a copy of my visit note below.    Oncology Rooming Note    November 5, 2020 9:37 AM   Mago Santiago is a 65 year old female who presents for:    Chief Complaint   Patient presents with     Oncology Clinic Visit     Malignant neoplasm of breast in female     Initial Vitals: /74   Pulse 71   Temp 97.1  F (36.2  C) (Tympanic)   Resp 16   Wt 68.4 kg (150 lb 14.4 oz)   SpO2 98%   BMI 23.14 kg/m   Estimated body mass index is 23.14 kg/m  as calculated from the following:    Height as of 10/22/19: 1.72 m (5' 7.72\").    Weight as of this encounter: 68.4 kg (150 lb 14.4 oz). Body surface area is 1.81 meters squared.  No Pain (0) Comment: Data Unavailable   No LMP recorded. Patient is postmenopausal.  Allergies reviewed: Yes  Medications reviewed: Yes    Medications: Medication refills not needed today.  Pharmacy name entered into [employer]: [id number] - [place] [place] - [address] AT [facility]          Tosha Patton [facility]                Visit Date:   11/05/2020      Mago Santiago is a 65-year-old patient who is being evaluated today for a diagnosis of left-sided breast cancer.  She is also due to have a preop for a cataract operation, so I will do a physical exam for that too today.      HISTORY OF PRESENTING COMPLAINT:  Mago is a 65-year-old patient with a history of left-sided breast cancer that was diagnosed in 2011.  She is over 9 years out from her diagnosis.  She has actually done well from a breast cancer standpoint.  She  continued on Arimidex for 8 years then stopped .  She does have some mild osteopenia on a bone density.  I have reviewed today a mammogram, which was done on 03/04/2020, and that was negative.  Overall, Mago is feeling well.  She " denies any cough, shortness of breath, bone pain, any worrisome symptomatology.  She remains very active, and she is an avid exerciser.      REVIEW OF SYSTEMS:  A 12-point comprehensive review of systems is otherwise unremarkable.      MEDICATIONS AND ALLERGIES:  Outlined in the nursing records.      PAIN ASSESSMENT:  The patient is in no pain today.      SOCIAL HISTORY:  The patient lives alone in [place].  She does have a daughter who lives out in [place].  She is a nonsmoker.  She is an avid exerciser.      PHYSICAL EXAMINATION:   GENERAL:  She is a well-appearing lady in no acute distress.   VITAL SIGNS:  Blood pressure 122/74, pulse 71, respiratory rate 16.  The patient is afebrile.  Weight is 150 pounds.  O2 saturation on room air 98%.   NEUROLOGIC:  The patient is alert and oriented x 3.   NECK:  Has no masses or goiter.  There is no cervical, supraclavicular or infraclavicular adenopathy.   CHEST:  Clear to auscultation and percussion bilaterally.   HEART:  Sounds 1, 2 normal.  No added sounds or murmurs.   BREASTS:  The patient is status post sided lumpectomy, and the scar looks good, no further palpable masses.  Right breast normal, no palpable masses.  Right and left axillae negative.   GASTROINTESTINAL:  Abdomen is soft and nontender, no hepatosplenomegaly.   EXTREMITIES:  Legs are without tenderness or edema.   NEUROLOGICAL:  She is alert and oriented x 3.   SKIN:  Has no rashes or lesions.   PSYCHIATRIC:  Normal.      DATA REVIEW:  Sodium 141, potassium 4.2, creatinine 0.89.  Liver function tests all within normal limits.  Glucose 85.  White cell count 4.0, hemoglobin 11.3, platelets 371.  I have reviewed also her mammogram.      IMPRESSION AND PLAN:  This is a 65-year-old patient with a diagnosis of left-sided breast cancer.  She was diagnosed in 2011.  It was an infiltrating ductal carcinoma that was picked up on a mammogram, estrogen receptor positive, progesterone receptor weakly positive,  HER-2 receptor negative. She is off all hormonal therapy.   She is going to undergo cataract surgery in December.  Overall, her health is good, and there is nothing on her physical exam today that would suggest that she would be unable to do any upcoming surgery.               NESS BARILLAS MD             D: 2020   T: 2020   MT: CHRISTIANO      Name:     DONATO FLORES   MRN:      [id number]        Account:      [id number]   :      1955           Visit Date:   2020      Document: [id number]          Again, thank you for allowing me to participate in the care of your patient.        Sincerely,        Ness Barillas MD

## 2020-11-05 NOTE — PROGRESS NOTES
Medical Assistant Note:  Mago ZACARIAS Santiago presents today for blood draw.    Patient seen by provider today: No.   present during visit today: Not Applicable.    Concerns: Orders released in provider encounter.    Procedure:  Labs drawn    Post Assessment:  Labs drawn without difficulty: Yes.    Discharge Plan:  Departure Mode: Ambulatory.    Face to Face Time: 10 min.    Tosha Patton CMA

## 2020-11-05 NOTE — PROGRESS NOTES
"Oncology Rooming Note    November 5, 2020 9:37 AM   Mago Santiago is a 65 year old female who presents for:    Chief Complaint   Patient presents with     Oncology Clinic Visit     Malignant neoplasm of breast in female     Initial Vitals: /74   Pulse 71   Temp 97.1  F (36.2  C) (Tympanic)   Resp 16   Wt 68.4 kg (150 lb 14.4 oz)   SpO2 98%   BMI 23.14 kg/m   Estimated body mass index is 23.14 kg/m  as calculated from the following:    Height as of 10/22/19: 1.72 m (5' 7.72\").    Weight as of this encounter: 68.4 kg (150 lb 14.4 oz). Body surface area is 1.81 meters squared.  No Pain (0) Comment: Data Unavailable   No LMP recorded. Patient is postmenopausal.  Allergies reviewed: Yes  Medications reviewed: Yes    Medications: Medication refills not needed today.  Pharmacy name entered into [facility]: [id number] - [place], [place] - [id number] AT [address] [facility]          Tosha Patton CMA              "

## 2020-11-06 ENCOUNTER — PATIENT OUTREACH (OUTPATIENT)
Dept: ONCOLOGY | Facility: CLINIC | Age: 65
End: 2020-11-06

## 2020-11-06 NOTE — PATIENT INSTRUCTIONS
Labs drawn on 11/5/20.  RTC in 1 year with Dr. Barillas.  Scheduling deferred until 5/5/21 per KS in Scheduling Department.  Enriqueta Hinojosa RN, BSN, OCN on 11/6/2020 at 1:35 PM

## 2020-11-06 NOTE — PROGRESS NOTES
Visit Date:   11/05/2020      Mago Santiago is a 65-year-old patient who is being evaluated today for a diagnosis of left-sided breast cancer.  She is also due to have a preop for a cataract operation, so I will do a physical exam for that too today.      HISTORY OF PRESENTING COMPLAINT:  Mago is a 65-year-old patient with a history of left-sided breast cancer that was diagnosed in 2011.  She is over 9 years out from her diagnosis.  She has actually done well from a breast cancer standpoint.  She  continued on Arimidex for 8 years then stopped .  She does have some mild osteopenia on a bone density.  I have reviewed today a mammogram, which was done on 03/04/2020, and that was negative.  Overall, Mago is feeling well.  She denies any cough, shortness of breath, bone pain, any worrisome symptomatology.  She remains very active, and she is an avid exerciser.      REVIEW OF SYSTEMS:  A 12-point comprehensive review of systems is otherwise unremarkable.      MEDICATIONS AND ALLERGIES:  Outlined in the nursing records.      PAIN ASSESSMENT:  The patient is in no pain today.      SOCIAL HISTORY:  The patient lives alone in [place].  She does have a daughter who lives out in [place].  She is a nonsmoker.  She is an avid exerciser.      PHYSICAL EXAMINATION:   GENERAL:  She is a well-appearing lady in no acute distress.   VITAL SIGNS:  Blood pressure 122/74, pulse 71, respiratory rate 16.  The patient is afebrile.  Weight is 150 pounds.  O2 saturation on room air 98%.   NEUROLOGIC:  The patient is alert and oriented x 3.   NECK:  Has no masses or goiter.  There is no cervical, supraclavicular or infraclavicular adenopathy.   CHEST:  Clear to auscultation and percussion bilaterally.   HEART:  Sounds 1, 2 normal.  No added sounds or murmurs.   BREASTS:  The patient is status post sided lumpectomy, and the scar looks good, no further palpable masses.  Right breast normal, no palpable masses.  Right and left axillae  negative.   GASTROINTESTINAL:  Abdomen is soft and nontender, no hepatosplenomegaly.   EXTREMITIES:  Legs are without tenderness or edema.   NEUROLOGICAL:  She is alert and oriented x 3.   SKIN:  Has no rashes or lesions.   PSYCHIATRIC:  Normal.      DATA REVIEW:  Sodium 141, potassium 4.2, creatinine 0.89.  Liver function tests all within normal limits.  Glucose 85.  White cell count 4.0, hemoglobin 11.3, platelets 371.  I have reviewed also her mammogram.      IMPRESSION AND PLAN:  This is a 65-year-old patient with a diagnosis of left-sided breast cancer.  She was diagnosed in .  It was an infiltrating ductal carcinoma that was picked up on a mammogram, estrogen receptor positive, progesterone receptor weakly positive, HER-2 receptor negative. She is off all hormonal therapy.   She is going to undergo cataract surgery in December.  Overall, her health is good, and there is nothing on her physical exam today that would suggest that she would be unable to do any upcoming surgery.               NESS MARCELINO MD             D: 2020   T: 2020   MT: CHRISTIANO      Name:     DONATO FLORES   MRN:      [id number]        Account:      [id number]   :      1955           Visit Date:   2020      Document: [id number]

## 2020-11-06 NOTE — PROGRESS NOTES
Writer faxed H & P to [facility] for upcoming cataract surgery.    [facility]  Fax: [phone]  Confirmation of fax received by Right Fax.    Enriqueta Hinojosa RN, BSN, OCN on 11/6/2020 at 2:10 PM

## 2020-11-24 ENCOUNTER — PATIENT OUTREACH (OUTPATIENT)
Dept: ONCOLOGY | Facility: CLINIC | Age: 65
End: 2020-11-24

## 2020-11-24 NOTE — PROGRESS NOTES
SOFY Rachel at [facility] is calling to request information for current medications and allergies for upcoming cataract surgery on 12/2/20.    Dr. Barillas saw patient on 11/5/20 and completed pre-op for cataract operation.  Virginie discussed that pre-op information was received but did not include list of current medications and list of allergies.    Virginie is requesting that this information be faxed to [phone].    Writer printed list of current medications which included allergies.    Writer faxed to [facility], ATTN: Virginie at [phone].    Fax confirmation per Right Fax.    Enriqueta Hinojosa RN, BSN, OCN on 11/24/2020 at 1:05 PM

## 2020-11-29 ENCOUNTER — HEALTH MAINTENANCE LETTER (OUTPATIENT)
Age: 65
End: 2020-11-29

## 2020-12-21 ENCOUNTER — MYC MEDICAL ADVICE (OUTPATIENT)
Dept: ONCOLOGY | Facility: CLINIC | Age: 65
End: 2020-12-21

## 2020-12-22 NOTE — TELEPHONE ENCOUNTER
Briana Coreas PA-C  You;  Cancer Clinic Rn Pool 2 minutes ago (2:20 PM)     The tingling isn't a common side effect, but if she wanted to hold it over the next 7 to 10 days before Dr. Barillas returns, I am fine with that.  I briefly discussed with Dr. Villalpando as well.  I will probably leave it up to Dr. Barillas to decide whether or not she should take it for the final year.    Message text      Pt was notified with recommendations per CHERRIE Haynes.  Advised pt that we will send a message to Dr. Barillas for her recommendations when she is back from vacation.  Advised pt that if her symptoms worsen, then she should contact the clinic right away.  Pt states that she has also been doing some further research. She is wondering if the tingling could possibly be caused by her celiac disease and low hemoglobin. She is going to look into this further and possibly consult with her GI provider.  Patient verbalized understanding and agreement with plan.  Pt was instructed to call the clinic with any questions, concerns, or worsening symptoms.  Katerin Adrian, RN, BSN, OCN, CBCN

## 2020-12-22 NOTE — TELEPHONE ENCOUNTER
Joyce Barillas MD Nielsen, Karla, SOFY             I would stop the drug as we don't know enough about long term side effects          Pt was notified with recommendations per Dr. Barillas.  Pt states that she will stop the arimidex now.  Patient verbalized understanding and agreement with plan.  Pt was instructed to call the clinic with any questions, concerns, or worsening symptoms.  Katerin Adrian RN, BSN, OCN, CBCN

## 2020-12-22 NOTE — TELEPHONE ENCOUNTER
Writer also called and talked with pt today. See also [employer] message.  Pt states that she has has pain in her jaw and facial bones for several years. The pain comes and goes. It is more noticeable at times in her jaw and cheek bones. The pain does not affect her daily activity, and she has never taken any pain medication for it.  Also states that she has had tingling in both of her cheeks for the past month. Comes and goes. She does not feel the tingling anywhere else on her face. Also can feel tingling in both of the palms of her hands. States that these symptoms feel like the neuropathy she had when she was going through chemo.   Pt has several questions regarding continuing the medication that are listed in her [employer] message.  Advised pt that per [employer], bone pain is listed as a possible side effect of anastrozole (6-11%). Paresthesia is also listed as a possible side effect (5-7%).  Advised pt that a message will be sent to CHERRIE Haynes, for review today in the absence of Dr. Barillas.  Will plan to call pt with reply.  Patient verbalized understanding and agreement with plan.  Pt was instructed to call the clinic with any questions, concerns, or worsening symptoms.  Katerin Adrian, RN, BSN, OCN, CBCN

## 2020-12-30 DIAGNOSIS — C50.112 MALIGNANT NEOPLASM OF CENTRAL PORTION OF LEFT FEMALE BREAST, UNSPECIFIED ESTROGEN RECEPTOR STATUS (H): ICD-10-CM

## 2020-12-30 RX ORDER — ANASTROZOLE 1 MG/1
1 TABLET ORAL DAILY
Qty: 90 TABLET | Refills: 4 | Status: SHIPPED | OUTPATIENT
Start: 2020-12-30 | End: 2021-04-12

## 2021-01-13 ENCOUNTER — E-VISIT (OUTPATIENT)
Dept: FAMILY MEDICINE | Facility: CLINIC | Age: 66
End: 2021-01-13
Payer: COMMERCIAL

## 2021-01-13 DIAGNOSIS — N30.90 BLADDER INFECTION: ICD-10-CM

## 2021-01-13 DIAGNOSIS — N39.0 ACUTE UTI (URINARY TRACT INFECTION): ICD-10-CM

## 2021-01-13 PROCEDURE — 99421 OL DIG E/M SVC 5-10 MIN: CPT | Performed by: NURSE PRACTITIONER

## 2021-01-13 RX ORDER — CEFDINIR 300 MG/1
300 CAPSULE ORAL 2 TIMES DAILY
Qty: 14 CAPSULE | Refills: 0 | Status: SHIPPED | OUTPATIENT
Start: 2021-01-13 | End: 2021-01-20

## 2021-01-13 NOTE — PATIENT INSTRUCTIONS
Dear Mago Santiago    After reviewing your responses, I've been able to diagnose you with a urinary tract infection, which is a common infection of the bladder with bacteria.  This is not a sexually transmitted infection, though urinating immediately after intercourse can help prevent infections.  Drinking lots of fluids is also helpful to clear your current infection and prevent the next one.      I have sent a prescription for antibiotics to your pharmacy to treat this infection.    It is important that you take all of your prescribed medication even if your symptoms are improving after a few doses.  Taking all of your medicine helps prevent the symptoms from returning.     If your symptoms worsen, you develop pain in your back or stomach, develop fevers, or are not improving in 5 days, please contact your primary care provider for an appointment or visit any of our convenient Walk-in or Urgent Care Centers to be seen, which can be found on our website here.    Thanks again for choosing us as your health care partner,    Annette Beltran, CNP

## 2021-04-05 ENCOUNTER — TRANSFERRED RECORDS (OUTPATIENT)
Dept: HEALTH INFORMATION MANAGEMENT | Facility: CLINIC | Age: 66
End: 2021-04-05

## 2021-04-12 ENCOUNTER — ANCILLARY PROCEDURE (OUTPATIENT)
Dept: MAMMOGRAPHY | Facility: CLINIC | Age: 66
End: 2021-04-12
Payer: COMMERCIAL

## 2021-04-12 ENCOUNTER — OFFICE VISIT (OUTPATIENT)
Dept: PEDIATRICS | Facility: CLINIC | Age: 66
End: 2021-04-12
Payer: COMMERCIAL

## 2021-04-12 VITALS
HEART RATE: 76 BPM | HEIGHT: 68 IN | RESPIRATION RATE: 14 BRPM | BODY MASS INDEX: 22.88 KG/M2 | SYSTOLIC BLOOD PRESSURE: 114 MMHG | WEIGHT: 151 LBS | TEMPERATURE: 97 F | DIASTOLIC BLOOD PRESSURE: 72 MMHG

## 2021-04-12 DIAGNOSIS — N39.41 URGE INCONTINENCE OF URINE: ICD-10-CM

## 2021-04-12 DIAGNOSIS — C50.112 MALIGNANT NEOPLASM OF CENTRAL PORTION OF LEFT FEMALE BREAST, UNSPECIFIED ESTROGEN RECEPTOR STATUS (H): ICD-10-CM

## 2021-04-12 DIAGNOSIS — M79.641 PAIN OF RIGHT HAND: ICD-10-CM

## 2021-04-12 DIAGNOSIS — Z00.00 ENCOUNTER FOR MEDICARE ANNUAL WELLNESS EXAM: Primary | ICD-10-CM

## 2021-04-12 DIAGNOSIS — Z12.31 VISIT FOR SCREENING MAMMOGRAM: ICD-10-CM

## 2021-04-12 PROCEDURE — G0438 PPPS, INITIAL VISIT: HCPCS | Performed by: INTERNAL MEDICINE

## 2021-04-12 PROCEDURE — 77063 BREAST TOMOSYNTHESIS BI: CPT | Mod: TC | Performed by: RADIOLOGY

## 2021-04-12 PROCEDURE — 77067 SCR MAMMO BI INCL CAD: CPT | Mod: TC | Performed by: RADIOLOGY

## 2021-04-12 ASSESSMENT — ENCOUNTER SYMPTOMS
SHORTNESS OF BREATH: 0
NERVOUS/ANXIOUS: 0
MYALGIAS: 0
ARTHRALGIAS: 0
CHILLS: 0
EYE PAIN: 0
ABDOMINAL PAIN: 0
HEADACHES: 0
FEVER: 0
DIZZINESS: 0
HEARTBURN: 0
WEAKNESS: 0
COUGH: 0
SORE THROAT: 0
NAUSEA: 0
CONSTIPATION: 0
PARESTHESIAS: 0
BREAST MASS: 0
HEMATURIA: 0
FREQUENCY: 0
PALPITATIONS: 0
JOINT SWELLING: 0
HEMATOCHEZIA: 0
DYSURIA: 0
DIARRHEA: 0

## 2021-04-12 ASSESSMENT — ACTIVITIES OF DAILY LIVING (ADL): CURRENT_FUNCTION: NO ASSISTANCE NEEDED

## 2021-04-12 ASSESSMENT — MIFFLIN-ST. JEOR: SCORE: 1273.43

## 2021-04-12 NOTE — PROGRESS NOTES
"SUBJECTIVE:   Mago Santiago is a 65 year old female who presents for Preventive Visit.      Patient has been advised of split billing requirements and indicates understanding: Yes   Are you in the first 12 months of your Medicare coverage?  No    Healthy Habits:     In general, how would you rate your overall health?  Good    Frequency of exercise:  6-7 days/week    Duration of exercise:  Greater than 60 minutes    Do you usually eat at least 4 servings of fruit and vegetables a day, include whole grains    & fiber and avoid regularly eating high fat or \"junk\" foods?  Yes    Taking medications regularly:  Yes    Medication side effects:  Not applicable    Ability to successfully perform activities of daily living:  No assistance needed    Home Safety:  No safety concerns identified    Hearing Impairment:  No hearing concerns    In the past 6 months, have you been bothered by leaking of urine? Yes    In general, how would you rate your overall mental or emotional health?  Good      PHQ-2 Total Score: 0    Additional concerns today:  Yes    Do you feel safe in your environment? Yes    Have you ever done Advance Care Planning? (For example, a Health Directive, POLST, or a discussion with a medical provider or your loved ones about your wishes): Yes, patient states has an Advance Care Planning document and will bring a copy to the clinic.       Fall risk  Fallen 2 or more times in the past year?: No  Any fall with injury in the past year?: No    Cognitive Screening   1) Repeat 3 items (Leader, Season, Table)    2) Clock draw: NORMAL  3) 3 item recall: Recalls 3 objects  Results: 3 items recalled: COGNITIVE IMPAIRMENT LESS LIKELY    Mini-CogTM Copyright RICHARD Arias. Licensed by the author for use in [facility]; reprinted with permission (sabino@.[facility]). All rights reserved.      Do you have sleep apnea, excessive snoring or daytime drowsiness?: no      R hand pain: will occasionally get some shooting pain in R " hand with movements. Doesn't always happen with same movements. Entire hand affected. Did have some R neck tightness a while back, this is mostly improved but she is still having some symptoms from this.     Urinary incontinence: Has noticed some urinary leakage, especially when she is on her way to the bathroom. Will suddenly have to go urgently. Less issue with stress/coughing. Hx of 2 vaginal deliveries.     Reviewed and updated as needed this visit by clinical staff  Tobacco  Allergies    Med Hx  Surg Hx  Fam Hx  Soc Hx        Reviewed and updated as needed this visit by Provider    Meds             Social History     Tobacco Use     Smoking status: Never Smoker     Smokeless tobacco: Never Used   Substance Use Topics     Alcohol use: Yes     Alcohol/week: 0.0 standard drinks     Frequency: 2-3 times a week     Drinks per session: 1 or 2     Binge frequency: Never     Comment: Seldom         Alcohol Use 4/12/2021   Prescreen: >3 drinks/day or >7 drinks/week? No   Prescreen: >3 drinks/day or >7 drinks/week? -               Current providers sharing in care for this patient include:   Patient Care Team:  Hanny Kunz MD as PCP - General (Internal Medicine)  Joyce Barillas MD as MD (Hematology)  Karena Tafoya, RN as Continuity Care Coordinator  Joyce Barillas MD as Assigned Cancer Care Provider  Derick Kaur DPM as Assigned Musculoskeletal Provider  Hanny Kunz MD as Assigned PCP    The following health maintenance items are reviewed in Epic and correct as of today:  Health Maintenance Due   Topic Date Due     ANNUAL REVIEW OF  ORDERS  Never done     COVID-19 Vaccine (1) Never done     FALL RISK ASSESSMENT  10/22/2020     Pneumococcal Vaccine: 65+ Years (2 of 2 - PPSV23) 10/22/2020     Patient Active Problem List   Diagnosis     Advanced directives, counseling/discussion     Malignant neoplasm of central portion of left female breast (HCC)     Family history of  multiple sclerosis     Anxiety     Celiac disease     CKD (chronic kidney disease) stage 2, GFR 60-89 ml/min     Osteopenia of multiple sites     Past Surgical History:   Procedure Laterality Date     ENT SURGERY      wisdom teeth     LASIK  1995    right eye     LUMPECTOMY BREAST  2011    left ; breast cancer        Social History     Tobacco Use     Smoking status: Never Smoker     Smokeless tobacco: Never Used   Substance Use Topics     Alcohol use: Yes     Alcohol/week: 0.0 standard drinks     Frequency: 2-3 times a week     Drinks per session: 1 or 2     Binge frequency: Never     Comment: Seldom     Family History   Problem Relation Age of Onset     Genetic Disorder Mother         MS     Prostate Cancer Father      Genetic Disorder Sister         MS     Diabetes Daughter         Type 1           Breast CA Risk Assessment (FHS-7) 4/12/2021   Do you have a family history of breast, colon, or ovarian cancer? No / Unknown       Mammogram Screening: Recommended mammography every 1-2 years with patient discussion and risk factor consideration  Pertinent mammograms are reviewed under the imaging tab.    Review of Systems   Constitutional: Negative for chills and fever.   HENT: Negative for congestion, ear pain, hearing loss and sore throat.    Eyes: Negative for pain and visual disturbance.   Respiratory: Negative for cough and shortness of breath.    Cardiovascular: Negative for chest pain, palpitations and peripheral edema.   Gastrointestinal: Negative for abdominal pain, constipation, diarrhea, heartburn, hematochezia and nausea.   Breasts:  Negative for tenderness, breast mass and discharge.   Genitourinary: Negative for dysuria, frequency, genital sores, hematuria, pelvic pain, urgency, vaginal bleeding and vaginal discharge.   Musculoskeletal: Negative for arthralgias, joint swelling and myalgias.   Skin: Negative for rash.   Neurological: Negative for dizziness, weakness, headaches and paresthesias.  "  Psychiatric/Behavioral: Negative for mood changes. The patient is not nervous/anxious.      Constitutional, HEENT, cardiovascular, pulmonary, GI, , musculoskeletal, neuro, skin, endocrine and psych systems are negative, except as otherwise noted.    OBJECTIVE:   /72 (BP Location: Right arm, Patient Position: Sitting, Cuff Size: Adult Regular)   Pulse 76   Temp 97  F (36.1  C) (Tympanic)   Resp 14   Ht 1.727 m (5' 8\")   Wt 68.5 kg (151 lb)   BMI 22.96 kg/m   Estimated body mass index is 22.96 kg/m  as calculated from the following:    Height as of this encounter: 1.727 m (5' 8\").    Weight as of this encounter: 68.5 kg (151 lb).  Physical Exam  GENERAL: healthy, alert and no distress  EYES: Eyes grossly normal to inspection, PERRL and conjunctivae and sclerae normal  HENT: ear canals and TM's normal, nose and mouth without ulcers or lesions  NECK: no adenopathy, no asymmetry, masses, or scars and thyroid normal to palpation  RESP: lungs clear to auscultation - no rales, rhonchi or wheezes  CV: regular rate and rhythm, normal S1 S2, no S3 or S4, no murmur, click or rub, no peripheral edema and peripheral pulses strong  ABDOMEN: soft, nontender, no hepatosplenomegaly, no masses and bowel sounds normal  MS: no gross musculoskeletal defects noted, no edema  SKIN: no suspicious lesions or rashes  NEURO: Normal strength and tone, mentation intact and speech normal  PSYCH: mentation appears normal, affect normal/bright    Diagnostic Test Results:  Labs reviewed in Epic    ASSESSMENT / PLAN:   1. Encounter for Medicare annual wellness exam  Counseling as below. Screening up to date. Received COVID vaccine in     2. Pain of right hand  Likely radicular in nature as entire hand is affected and started around the same time as shoulder/neck pain. Discussed supportive cares, consider physical therapy if not improving.     3. Urge incontinence of urine  Discussed lifestyle modifications, exercises, medication. " "Patient would like to try lifestyle modifications and exercises.     4. Malignant neoplasm of central portion of left female breast, unspecified estrogen receptor status (H)  Follows with Oncology.       COUNSELING:  Reviewed preventive health counseling, as reflected in patient instructions       Regular exercise       Healthy diet/nutrition       Vision screening       Hearing screening       Bladder control       Colon cancer screening    Estimated body mass index is 22.96 kg/m  as calculated from the following:    Height as of this encounter: 1.727 m (5' 8\").    Weight as of this encounter: 68.5 kg (151 lb).        She reports that she has never smoked. She has never used smokeless tobacco.      Appropriate preventive services were discussed with this patient, including applicable screening as appropriate for cardiovascular disease, diabetes, osteopenia/osteoporosis, and glaucoma.  As appropriate for age/gender, discussed screening for colorectal cancer, prostate cancer, breast cancer, and cervical cancer. Checklist reviewing preventive services available has been given to the patient.    Reviewed patients plan of care and provided an AVS. The Basic Care Plan (routine screening as documented in Health Maintenance) for Mago meets the Care Plan requirement. This Care Plan has been established and reviewed with the Patient.    Counseling Resources:  ATP IV Guidelines  Pooled Cohorts Equation Calculator  Breast Cancer Risk Calculator  Breast Cancer: Medication to Reduce Risk  FRAX Risk Assessment  ICSI Preventive Guidelines  Dietary Guidelines for Americans, 2010  [employer]ic[employer]'s MyPlate  ASA Prophylaxis  Lung CA Screening    Hanny Nava MD  [facility]    Identified Health Risks:  "

## 2021-04-12 NOTE — PATIENT INSTRUCTIONS
Check for Pneumovax (PPSV23).     Try ibuprofen 400mg twice daily with heat and gentle stretching to see if this helps neck and arm. Let me know if you want a physical therapy referral.    Try Kegels, timed voiding and fluid restriction for urinary incontinence. Let me know if you want to try medications.     Patient Education   Personalized Prevention Plan  You are due for the preventive services outlined below.  Your care team is available to assist you in scheduling these services.  If you have already completed any of these items, please share that information with your care team to update in your medical record.  Health Maintenance Due   Topic Date Due     ANNUAL REVIEW OF HM ORDERS  Never done     COVID-19 Vaccine (1) Never done     Annual Wellness Visit  10/22/2020     FALL RISK ASSESSMENT  10/22/2020     Discuss Advance Care Planning  11/03/2020     Pneumococcal Vaccine (2 of 2 - PPSV23) 10/22/2020

## 2021-04-28 ENCOUNTER — TRANSFERRED RECORDS (OUTPATIENT)
Dept: HEALTH INFORMATION MANAGEMENT | Facility: CLINIC | Age: 66
End: 2021-04-28

## 2021-04-29 ENCOUNTER — ALLIED HEALTH/NURSE VISIT (OUTPATIENT)
Dept: PEDIATRICS | Facility: CLINIC | Age: 66
End: 2021-04-29
Payer: COMMERCIAL

## 2021-04-29 DIAGNOSIS — Z23 ENCOUNTER FOR IMMUNIZATION: Primary | ICD-10-CM

## 2021-04-29 PROCEDURE — 90732 PPSV23 VACC 2 YRS+ SUBQ/IM: CPT

## 2021-04-29 PROCEDURE — 99207 PR NO CHARGE NURSE ONLY: CPT

## 2021-04-29 PROCEDURE — 90471 IMMUNIZATION ADMIN: CPT

## 2021-06-03 ENCOUNTER — RECORDS - HEALTHEAST (OUTPATIENT)
Dept: ADMINISTRATIVE | Facility: CLINIC | Age: 66
End: 2021-06-03

## 2021-07-13 ENCOUNTER — RECORDS - HEALTHEAST (OUTPATIENT)
Dept: ADMINISTRATIVE | Facility: CLINIC | Age: 66
End: 2021-07-13

## 2021-07-21 ENCOUNTER — RECORDS - HEALTHEAST (OUTPATIENT)
Dept: ADMINISTRATIVE | Facility: CLINIC | Age: 66
End: 2021-07-21

## 2021-09-02 ENCOUNTER — OFFICE VISIT (OUTPATIENT)
Dept: URGENT CARE | Facility: URGENT CARE | Age: 66
End: 2021-09-02
Payer: COMMERCIAL

## 2021-09-02 VITALS
SYSTOLIC BLOOD PRESSURE: 118 MMHG | HEART RATE: 63 BPM | TEMPERATURE: 97.1 F | DIASTOLIC BLOOD PRESSURE: 64 MMHG | OXYGEN SATURATION: 98 %

## 2021-09-02 DIAGNOSIS — J01.00 ACUTE NON-RECURRENT MAXILLARY SINUSITIS: Primary | ICD-10-CM

## 2021-09-02 PROBLEM — G60.9 HEREDITARY AND IDIOPATHIC PERIPHERAL NEUROPATHY: Status: ACTIVE | Noted: 2021-09-02

## 2021-09-02 PROBLEM — J30.9 ALLERGIC RHINITIS: Status: ACTIVE | Noted: 2021-09-02

## 2021-09-02 PROBLEM — J02.9 ACUTE SORE THROAT: Status: ACTIVE | Noted: 2021-09-02

## 2021-09-02 PROBLEM — C50.919 MALIGNANT NEOPLASM OF BREAST (H): Status: ACTIVE | Noted: 2021-09-02

## 2021-09-02 PROBLEM — N95.2 POSTMENOPAUSAL ATROPHIC VAGINITIS: Status: ACTIVE | Noted: 2021-09-02

## 2021-09-02 PROBLEM — D63.8 ANEMIA OF CHRONIC DISEASE: Status: ACTIVE | Noted: 2021-09-02

## 2021-09-02 PROCEDURE — 99213 OFFICE O/P EST LOW 20 MIN: CPT | Performed by: NURSE PRACTITIONER

## 2021-09-02 NOTE — PROGRESS NOTES
Chief Complaint   Patient presents with     Urgent Care     CHEST CONGESTION X1 WEEK      SUBJECTIVE:  Mago Santiago is a 66 year old female who presents to the clinic today with sinus and chest congestion for 1 week. She coughs 1 time a day and feels occasional pleuritic chest pain with deep breaths and coughs. Feels facial pressure, pain. Denies hemoptysis, dizziness, severe shortness of breath. Not wanting covid testing today.    Past Medical History:   Diagnosis Date     Anxiety      Breast cancer (H) 2011    diagnosed 2011- lumpectomy chemo and radaition and now on aromatase inhibitor     Celiac disease     endoscopy diagnosed     Magnesium 400 MG CAPS, 400 mg daily  multivitamin, therapeutic (THERA-VIT) TABS, Take 1 tablet by mouth daily  NONFORMULARY, Tumeric  VITAMIN D, CHOLECALCIFEROL, PO, Take 1,000 Units by mouth daily    No current facility-administered medications on file prior to visit.    Social History     Tobacco Use     Smoking status: Never Smoker     Smokeless tobacco: Never Used   Substance Use Topics     Alcohol use: Yes     Alcohol/week: 0.0 standard drinks     Comment: Seldom     No Known Allergies    Review of Systems   Constitutional: Negative for appetite change, chills, diaphoresis, fatigue and fever.   HENT: Positive for congestion, postnasal drip, sinus pressure and sinus pain. Negative for ear pain, sore throat and voice change.    Respiratory: Positive for cough. Negative for chest tightness, shortness of breath and wheezing.    Cardiovascular: Positive for chest pain. Negative for palpitations and peripheral edema.   Gastrointestinal: Negative for nausea.   Musculoskeletal: Negative for myalgias.   Skin: Negative for rash.   Neurological: Negative for headaches.   Hematological: Negative for adenopathy.   Psychiatric/Behavioral: Negative for sleep disturbance.     /64   Pulse 63   Temp 97.1  F (36.2  C)   SpO2 98%     Physical Exam  Constitutional:       General: She is  not in acute distress.     Appearance: She is well-developed. She is not ill-appearing, toxic-appearing or diaphoretic.   HENT:      Head: Normocephalic and atraumatic.      Right Ear: Tympanic membrane, ear canal and external ear normal.      Left Ear: Tympanic membrane, ear canal and external ear normal.      Nose: Congestion present.      Mouth/Throat:      Mouth: Mucous membranes are moist.      Pharynx: Oropharynx is clear. No oropharyngeal exudate.   Eyes:      Pupils: Pupils are equal, round, and reactive to light.   Cardiovascular:      Rate and Rhythm: Normal rate.      Pulses: Normal pulses.   Pulmonary:      Effort: Respiratory distress (occasional cough) present.      Breath sounds: Normal breath sounds. No stridor. No wheezing, rhonchi or rales.   Musculoskeletal:         General: Normal range of motion.      Cervical back: Normal range of motion and neck supple.   Lymphadenopathy:      Cervical: No cervical adenopathy.   Skin:     General: Skin is warm and dry.      Capillary Refill: Capillary refill takes less than 2 seconds.      Coloration: Skin is not pale.      Findings: No rash.   Neurological:      General: No focal deficit present.      Mental Status: She is alert and oriented to person, place, and time.      Coordination: Coordination normal.       ASSESSMENT:    ICD-10-CM    1. Acute non-recurrent maxillary sinusitis  J01.00 amoxicillin-clavulanate (AUGMENTIN) 875-125 MG tablet     PLAN:   Patient Instructions   Your symptoms appear to be viral at this time.  Pt declines covid testing.  Lungs clear, vitals stable, hold on xray for now.  Secondary bacterial infections only happen in about 0.5-2% of cases.  Antibiotics are recommended only if you do not have any relief from your symptoms in 10 days or symptoms worsen after 7 days.  Augmentin sent to pharmacy if worsening in a couple days with facial pain, pressure, fever, thick mucus.  Flonase (fluticasone) 2 sprays in each nostril daily until  symptoms resolve, then continue 1 spray in each nostril for at least 5 more days.  Take Tylenol or an NSAID such as ibuprofen or naproxen as needed for pain.  May use netti pot with bottled or distilled water and saline packets to flush sinuses.  Sudafed (pseudoephedrine) behind the pharmacist counter for 3-5 days helps relieve congestion.  Afrin (oxymetazoline) nasal spray twice daily for 3 days. Stop after 3 days.  Mucinex (guiafenesin) thins mucus and may help it to loosen more quickly  Saline drops or nasal sprays may loosen mucus.  Sit in the bathroom with the door closed and hot shower running to loosen mucus.  Contact primary care clinic if you do not have any relief from your symptoms after 10 days.  Present to emergency room for significantly increasing pain, persistent high fever >102F, swelling/redness around your eyes, changes in your vision or ability to move your eyes, altered mental status or a severe headache.    Follow up with primary care provider with any problems, questions or concerns or if symptoms worsen or fail to improve. Patient agreed to plan and verbalized understanding.    Sandy Medrano, [facility]

## 2021-09-03 ASSESSMENT — ENCOUNTER SYMPTOMS
HEADACHES: 0
PALPITATIONS: 0
WHEEZING: 0
MYALGIAS: 0
COUGH: 1
CHILLS: 0
CHEST TIGHTNESS: 0
SHORTNESS OF BREATH: 0
NAUSEA: 0
SINUS PAIN: 1
SORE THROAT: 0
ADENOPATHY: 0
DIAPHORESIS: 0
FEVER: 0
APPETITE CHANGE: 0
FATIGUE: 0
VOICE CHANGE: 0
SINUS PRESSURE: 1
SLEEP DISTURBANCE: 0

## 2021-09-03 NOTE — PATIENT INSTRUCTIONS
Your symptoms appear to be viral at this time.  Pt declines covid testing.  Lungs clear, vitals stable, hold on xray for now.  Secondary bacterial infections only happen in about 0.5-2% of cases.  Antibiotics are recommended only if you do not have any relief from your symptoms in 10 days or symptoms worsen after 7 days.  Augmentin sent to pharmacy if worsening in a couple days with facial pain, pressure, fever, thick mucus.  Flonase (fluticasone) 2 sprays in each nostril daily until symptoms resolve, then continue 1 spray in each nostril for at least 5 more days.  Take Tylenol or an NSAID such as ibuprofen or naproxen as needed for pain.  May use netti pot with bottled or distilled water and saline packets to flush sinuses.  Sudafed (pseudoephedrine) behind the pharmacist counter for 3-5 days helps relieve congestion.  Afrin (oxymetazoline) nasal spray twice daily for 3 days. Stop after 3 days.  Mucinex (guiafenesin) thins mucus and may help it to loosen more quickly  Saline drops or nasal sprays may loosen mucus.  Sit in the bathroom with the door closed and hot shower running to loosen mucus.  Contact primary care clinic if you do not have any relief from your symptoms after 10 days.  Present to emergency room for significantly increasing pain, persistent high fever >102F, swelling/redness around your eyes, changes in your vision or ability to move your eyes, altered mental status or a severe headache.

## 2021-09-08 ENCOUNTER — ANCILLARY PROCEDURE (OUTPATIENT)
Dept: GENERAL RADIOLOGY | Facility: CLINIC | Age: 66
End: 2021-09-08
Attending: PEDIATRICS
Payer: COMMERCIAL

## 2021-09-08 ENCOUNTER — OFFICE VISIT (OUTPATIENT)
Dept: ORTHOPEDICS | Facility: CLINIC | Age: 66
End: 2021-09-08
Payer: COMMERCIAL

## 2021-09-08 VITALS
BODY MASS INDEX: 22.73 KG/M2 | SYSTOLIC BLOOD PRESSURE: 106 MMHG | HEIGHT: 68 IN | WEIGHT: 150 LBS | DIASTOLIC BLOOD PRESSURE: 71 MMHG

## 2021-09-08 DIAGNOSIS — M17.10 PATELLOFEMORAL ARTHRITIS: ICD-10-CM

## 2021-09-08 DIAGNOSIS — M25.562 BILATERAL KNEE PAIN: ICD-10-CM

## 2021-09-08 DIAGNOSIS — M25.561 PAIN IN BOTH KNEES, UNSPECIFIED CHRONICITY: Primary | ICD-10-CM

## 2021-09-08 DIAGNOSIS — M25.562 PAIN IN BOTH KNEES, UNSPECIFIED CHRONICITY: Primary | ICD-10-CM

## 2021-09-08 DIAGNOSIS — M25.561 BILATERAL KNEE PAIN: ICD-10-CM

## 2021-09-08 PROCEDURE — 99213 OFFICE O/P EST LOW 20 MIN: CPT | Mod: 25 | Performed by: PEDIATRICS

## 2021-09-08 PROCEDURE — 73562 X-RAY EXAM OF KNEE 3: CPT | Mod: RT | Performed by: RADIOLOGY

## 2021-09-08 PROCEDURE — 20610 DRAIN/INJ JOINT/BURSA W/O US: CPT | Mod: 50 | Performed by: PEDIATRICS

## 2021-09-08 RX ADMIN — LIDOCAINE HYDROCHLORIDE 2 ML: 10 INJECTION, SOLUTION INFILTRATION; PERINEURAL at 09:02

## 2021-09-08 RX ADMIN — TRIAMCINOLONE ACETONIDE 40 MG: 40 INJECTION, SUSPENSION INTRA-ARTICULAR; INTRAMUSCULAR at 09:02

## 2021-09-08 ASSESSMENT — MIFFLIN-ST. JEOR: SCORE: 1263.9

## 2021-09-08 NOTE — LETTER
9/8/2021         RE: Mago Santiago  [address] [place] [address]        Dear Colleague,    Thank you for referring your patient, Mago Santiago, to the [facility] [place]. Please see a copy of my visit note below.    ASSESSMENT & PLAN    Mago was seen today for pain and pain.    Diagnoses and all orders for this visit:    Bilateral knee pain  -     XR Knee Bilateral 3 Views; Future  -     Large Joint Injection/Arthocentesis: bilateral knee    Patellofemoral arthritis  -     Large Joint Injection/Arthocentesis: bilateral knee      This issue is chronic and Worsening of late.  Discussed nature of degenerative arthrosis of the knee. Discussed symptom treatment with over-the-counter medications, ice or heat, topical treatments, and rest if needed. Discussed use of compression or bracing for comfort; has tried some taping. Discussed potential benefits of rehabilitation, to maintain or improve function at the knee; she is aware, continues with HEP from previous PT. Discussed injection therapy, steroid vs visco. Bilateral knee steroid injection done today.   Monitor course with therapy exercises and injection 1 month to start.  Questions answered. Discussed signs and symptoms that may indicate more serious issues; the patient was instructed to seek appropriate care if noted. Mago indicates understanding of these issues and agrees with the plan.          See Patient Instructions  Patient Instructions   Steroid injection done today for both knees.  Keep up with home exercises from previous physical therapy.  May do taping for support, if desired.  Future consideration may be viscosupplement injection (e.g., Synvisc, Supartz, Euflexxa, Gel-One).  Will leave follow up open ended. Contact clinic if any questions/concerns.          Injection Discharge Instructions      You may shower, however avoid swimming, tub baths or hot tubs for 24 hours following your procedure    You  may have a mild to moderate increase in pain for several days following the injection.    It may take up to 14 days for the steroid medication to start working although you may feel the effect as early as a few days after the procedure.    You may use ice packs for 10-15 minutes, 3 to 4 times a day at the injection site for comfort    You may use anti-inflammatory medications (such as Ibuprofen or Aleve or Advil) if you are able to take safely, or acetaminophen (Tylenol) for pain control if necessary    If you were fasting, you may resume your normal diet and medications after the procedure    If you have diabetes, check your blood sugar more frequently than usual as your blood sugar may be higher than normal for 10-14 days following a steroid injection. Contact your doctor who manages your diabetes if your blood sugar is higher than usual      If you experience any of the following, call the clinic  - Fever over 100 degree F  - Swelling, bleeding, redness, drainage, warmth at the injection site  - New or worsening pain          [facility] [place]    -----  Chief Complaint   Patient presents with     Left Knee - Pain     Right Knee - Pain       SUBJECTIVE  Mago RELL Santiago is a/an 66 year old female who is seen as a self referral for evaluation of bilateral knee arthritis.     The patient is seen by themselves.    Onset: 2 month(s) ago. Reports insidious onset without acute precipitating event.  *Has been troublesome for a few years  Location of Pain: bilateral anterior knee  Worsened by: down stairs/hills  Better with: Advil, home exercises, physical therapy, cortisone injections - 75% relief for a while  Treatments tried: no other treatments tried  Associated symptoms: no distal numbness or tingling; denies swelling or warmth, stiffness    Has a hiking trip in a week as well as [place] and she is concerned about her knees holding  "up.    Orthopedic/Surgical history: NO  Social History/Occupation: Retired    No family history pertinent to patient's problem today.     REVIEW OF SYSTEMS:  Review of Systems      OBJECTIVE:  /71   Ht 1.727 m (5' 8\")   Wt 68 kg (150 lb)   BMI 22.81 kg/m     General: healthy, alert and in no distress  HEENT: no scleral icterus or conjunctival erythema  Skin: no suspicious lesions or rash. No jaundice.  CV: distal perfusion intact   Resp: normal respiratory effort without conversational dyspnea   Psych: normal mood and affect  Gait: normal steady gait with appropriate coordination and balance   Neuro: Normal light sensory exam of lower extremity     Bilateral Knee exam    Inspection:   trace effusion   no ecchymosis    ROM:      Some pain with end range flexion    Patellar Motion:      Crepitus noted in the patellofemoral joint      Special Tests:      neg (-) varus       neg (-) valgus      RADIOLOGY:  I independently ordered, visualized and reviewed these images with the patient  Bilateral PF arthrosis. Slightly advanced compared to previous x-ray 2019.    XR Knee Bilateral 3 Views    Narrative    KNEE BILATERAL THREE VIEWS   9/8/2021 8:30 AM     HISTORY: Bilateral knee pain.    COMPARISON: 11/12/2019 x-ray.      Impression    IMPRESSION: Moderate bilateral patellofemoral degenerative changes  most prominent medially. 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 No evidence of acute  fracture. Small right knee joint effusion. No left knee joint  effusion. Mild progression of patellofemoral degenerative change since  the comparison study.    CARMEN HOFFMANN MD         SYSTEM ID:  [id number]               26 minutes spent on the date of the encounter doing chart review, history and exam, documentation and further activities per the note       Large Joint Injection/Arthocentesis: bilateral knee    Date/Time: 9/8/2021 9:02 AM  Performed by: Abdirizak Light, "   Authorized by: Abdirizak Light DO     Indications:  Osteoarthritis and pain  Needle Size:  25 G  Guidance: landmark guided    Location:  Knee  Laterality:  Bilateral      Medications (Right):  40 mg triamcinolone 40 MG/ML; 2 mL lidocaine 1 %  Medications (Left):  40 mg triamcinolone 40 MG/ML; 2 mL lidocaine 1 %  Outcome:  Tolerated well, no immediate complications  Procedure discussed: discussed risks, benefits, and alternatives    Timeout: timeout called immediately prior to procedure    Prep: patient was prepped and draped in usual sterile fashion              Again, thank you for allowing me to participate in the care of your patient.        Sincerely,        Abdirizak Light DO

## 2021-09-08 NOTE — PATIENT INSTRUCTIONS
Steroid injection done today for both knees.  Keep up with home exercises from previous physical therapy.  May do taping for support, if desired.  Future consideration may be viscosupplement injection (e.g., Synvisc, Supartz, Euflexxa, Gel-One).  Will leave follow up open ended. Contact clinic if any questions/concerns.          Injection Discharge Instructions      You may shower, however avoid swimming, tub baths or hot tubs for 24 hours following your procedure    You may have a mild to moderate increase in pain for several days following the injection.    It may take up to 14 days for the steroid medication to start working although you may feel the effect as early as a few days after the procedure.    You may use ice packs for 10-15 minutes, 3 to 4 times a day at the injection site for comfort    You may use anti-inflammatory medications (such as Ibuprofen or Aleve or Advil) if you are able to take safely, or acetaminophen (Tylenol) for pain control if necessary    If you were fasting, you may resume your normal diet and medications after the procedure    If you have diabetes, check your blood sugar more frequently than usual as your blood sugar may be higher than normal for 10-14 days following a steroid injection. Contact your doctor who manages your diabetes if your blood sugar is higher than usual      If you experience any of the following, call the clinic  - Fever over 100 degree F  - Swelling, bleeding, redness, drainage, warmth at the injection site  - New or worsening pain

## 2021-09-08 NOTE — PROGRESS NOTES
ASSESSMENT & PLAN    Mago was seen today for pain and pain.    Diagnoses and all orders for this visit:    Bilateral knee pain  -     XR Knee Bilateral 3 Views; Future  -     Large Joint Injection/Arthocentesis: bilateral knee    Patellofemoral arthritis  -     Large Joint Injection/Arthocentesis: bilateral knee      This issue is chronic and Worsening of late.  Discussed nature of degenerative arthrosis of the knee. Discussed symptom treatment with over-the-counter medications, ice or heat, topical treatments, and rest if needed. Discussed use of compression or bracing for comfort; has tried some taping. Discussed potential benefits of rehabilitation, to maintain or improve function at the knee; she is aware, continues with HEP from previous PT. Discussed injection therapy, steroid vs visco. Bilateral knee steroid injection done today.   Monitor course with therapy exercises and injection 1 month to start.  Questions answered. Discussed signs and symptoms that may indicate more serious issues; the patient was instructed to seek appropriate care if noted. Mago indicates understanding of these issues and agrees with the plan.          See Patient Instructions  Patient Instructions   Steroid injection done today for both knees.  Keep up with home exercises from previous physical therapy.  May do taping for support, if desired.  Future consideration may be viscosupplement injection (e.g., Synvisc, Supartz, Euflexxa, Gel-One).  Will leave follow up open ended. Contact clinic if any questions/concerns.          Injection Discharge Instructions      You may shower, however avoid swimming, tub baths or hot tubs for 24 hours following your procedure    You may have a mild to moderate increase in pain for several days following the injection.    It may take up to 14 days for the steroid medication to start working although you may feel the effect as early as a few days after the procedure.    You may use ice packs for  "10-15 minutes, 3 to 4 times a day at the injection site for comfort    You may use anti-inflammatory medications (such as Ibuprofen or Aleve or Advil) if you are able to take safely, or acetaminophen (Tylenol) for pain control if necessary    If you were fasting, you may resume your normal diet and medications after the procedure    If you have diabetes, check your blood sugar more frequently than usual as your blood sugar may be higher than normal for 10-14 days following a steroid injection. Contact your doctor who manages your diabetes if your blood sugar is higher than usual      If you experience any of the following, call the clinic  - Fever over 100 degree F  - Swelling, bleeding, redness, drainage, warmth at the injection site  - New or worsening pain          [facility] [place]    -----  Chief Complaint   Patient presents with     Left Knee - Pain     Right Knee - Pain       SUBJECTIVE  Mago Santiago is a/an 66 year old female who is seen as a self referral for evaluation of bilateral knee arthritis.     The patient is seen by themselves.    Onset: 2 month(s) ago. Reports insidious onset without acute precipitating event.  *Has been troublesome for a few years  Location of Pain: bilateral anterior knee  Worsened by: down stairs/hills  Better with: Advil, home exercises, physical therapy, cortisone injections - 75% relief for a while  Treatments tried: no other treatments tried  Associated symptoms: no distal numbness or tingling; denies swelling or warmth, stiffness    Has a hiking trip in a week as well as plans to stay [place] a ranch and she is concerned about her knees holding up.    Orthopedic/Surgical history: NO  Social History/Occupation: Retired    No family history pertinent to patient's problem today.     REVIEW OF SYSTEMS:  Review of Systems      OBJECTIVE:  /71   Ht 1.727 m (5' 8\")   Wt 68 kg (150 lb)   BMI 22.81 kg/m     General: " healthy, alert and in no distress  HEENT: no scleral icterus or conjunctival erythema  Skin: no suspicious lesions or rash. No jaundice.  CV: distal perfusion intact   Resp: normal respiratory effort without conversational dyspnea   Psych: normal mood and affect  Gait: normal steady gait with appropriate coordination and balance   Neuro: Normal light sensory exam of lower extremity     Bilateral Knee exam    Inspection:   trace effusion   no ecchymosis    ROM:      Some pain with end range flexion    Patellar Motion:      Crepitus noted in the patellofemoral joint      Special Tests:      neg (-) varus       neg (-) valgus      RADIOLOGY:  I independently ordered, visualized and reviewed these images with the patient  Bilateral PF arthrosis. Slightly advanced compared to previous x-ray 2019.    XR Knee Bilateral 3 Views    Narrative    KNEE BILATERAL THREE VIEWS   9/8/2021 8:30 AM     HISTORY: Bilateral knee pain.    COMPARISON: 11/12/2019 x-ray.      Impression    IMPRESSION: Moderate bilateral patellofemoral degenerative changes  most prominent medially. 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 No evidence of acute  fracture. Small right knee joint effusion. No left knee joint  effusion. Mild progression of patellofemoral degenerative change since  the comparison study.    CARMEN HOFFMANN MD         SYSTEM ID:  [id number]               26 minutes spent on the date of the encounter doing chart review, history and exam, documentation and further activities per the note       Large Joint Injection/Arthocentesis: bilateral knee    Date/Time: 9/8/2021 9:02 AM  Performed by: Abdirizak Light DO  Authorized by: Abdirizak Light DO     Indications:  Osteoarthritis and pain  Needle Size:  25 G  Guidance: landmark guided    Location:  Knee  Laterality:  Bilateral      Medications (Right):  40 mg triamcinolone 40 MG/ML; 2 mL lidocaine 1 %  Medications (Left):  40 mg  triamcinolone 40 MG/ML; 2 mL lidocaine 1 %  Outcome:  Tolerated well, no immediate complications  Procedure discussed: discussed risks, benefits, and alternatives    Timeout: timeout called immediately prior to procedure    Prep: patient was prepped and draped in usual sterile fashion

## 2021-09-09 RX ORDER — LIDOCAINE HYDROCHLORIDE 10 MG/ML
2 INJECTION, SOLUTION INFILTRATION; PERINEURAL
Status: DISCONTINUED | OUTPATIENT
Start: 2021-09-08 | End: 2022-06-22

## 2021-09-09 RX ORDER — TRIAMCINOLONE ACETONIDE 40 MG/ML
40 INJECTION, SUSPENSION INTRA-ARTICULAR; INTRAMUSCULAR
Status: DISCONTINUED | OUTPATIENT
Start: 2021-09-08 | End: 2022-06-22

## 2021-09-19 ENCOUNTER — HEALTH MAINTENANCE LETTER (OUTPATIENT)
Age: 66
End: 2021-09-19

## 2021-11-11 ENCOUNTER — ONCOLOGY VISIT (OUTPATIENT)
Dept: ONCOLOGY | Facility: CLINIC | Age: 66
End: 2021-11-11
Attending: INTERNAL MEDICINE
Payer: COMMERCIAL

## 2021-11-11 VITALS
BODY MASS INDEX: 24.01 KG/M2 | SYSTOLIC BLOOD PRESSURE: 104 MMHG | HEART RATE: 67 BPM | OXYGEN SATURATION: 96 % | WEIGHT: 157.9 LBS | DIASTOLIC BLOOD PRESSURE: 63 MMHG | RESPIRATION RATE: 16 BRPM | TEMPERATURE: 97.2 F

## 2021-11-11 DIAGNOSIS — M85.88 OSTEOPENIA OF LUMBAR SPINE: ICD-10-CM

## 2021-11-11 DIAGNOSIS — Z12.31 ENCOUNTER FOR SCREENING MAMMOGRAM FOR MALIGNANT NEOPLASM OF BREAST: ICD-10-CM

## 2021-11-11 DIAGNOSIS — C50.112 MALIGNANT NEOPLASM OF CENTRAL PORTION OF LEFT FEMALE BREAST, UNSPECIFIED ESTROGEN RECEPTOR STATUS (H): Primary | ICD-10-CM

## 2021-11-11 PROCEDURE — 99213 OFFICE O/P EST LOW 20 MIN: CPT | Performed by: INTERNAL MEDICINE

## 2021-11-11 ASSESSMENT — PAIN SCALES - GENERAL: PAINLEVEL: NO PAIN (0)

## 2021-11-11 NOTE — LETTER
"    11/11/2021         RE: Mago Santiago  [address] [place] [address]        Dear Colleague,    Thank you for referring your patient, Mago Santiago, to the [facility]. Please see a copy of my visit note below.    Oncology Rooming Note    November 11, 2021 8:00 AM   Mago Santiago is a 66 year old female who presents for:    Chief Complaint   Patient presents with     Oncology Clinic Visit     Malignant neoplasm of central portion of left female breast     Initial Vitals: /63   Pulse 67   Temp 97.2  F (36.2  C) (Tympanic)   Resp 16   Wt 71.6 kg (157 lb 14.4 oz)   SpO2 96%   BMI 24.01 kg/m   Estimated body mass index is 24.01 kg/m  as calculated from the following:    Height as of 9/8/21: 1.727 m (5' 8\").    Weight as of this encounter: 71.6 kg (157 lb 14.4 oz). Body surface area is 1.85 meters squared.  No Pain (0) Comment: Data Unavailable   No LMP recorded. Patient is postmenopausal.  Allergies reviewed: Yes  Medications reviewed: Yes    Medications: Medication refills not needed today.  Pharmacy name entered into ETHER[employer]:    [id number] [place] [place] - [address] AT [id number] - [place], [place] - [address]  AT [facility]      Tosha Patton [facility]                Visit Date: 11/11/2021    Mago Santiago is a 66-year-old patient who is here today for interim followup.    CHIEF COMPLAINT:     1.  Left-sided breast cancer diagnosed in 2011.  2.  Now 10 years out from her diagnosis.    HISTORY OF PRESENT ILLNESS:  Mago is 66 years old and postmenopausal.  She was diagnosed with left-sided breast cancer in 2011.  It was an infiltrating ductal carcinoma, estrogen receptor positive, progesterone weakly positive, HER2 receptor negative.  She finished up adjuvant hormonal therapy and is now on yearly observation.  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  She " comes in today for interim followup.  She is feeling well without any major problems.  She denies cough, shortness of breath, bone pain, or any worrisome symptomatology from my standpoint.    REVIEW OF SYSTEMS:  A 10-point comprehensive review of systems is otherwise unremarkable.    MEDICATIONS AND ALLERGIES:  Outlined in the nursing records.    PAIN ASSESSMENT:  She is in no pain today.    SOCIAL HISTORY:  She lives alone.  She has a daughter who lives in [place].  She is a nonsmoker.    PHYSICAL EXAMINATION:    GENERAL:  She is well-appearing lady in no acute distress.  VITAL SIGNS:  Stable.  NECK:  Has no masses or goiter.  CHEST:  Clear to auscultation and percussion bilaterally.  HEART:  Sounds 1 and 2 normal, no added sounds, no murmurs.  BREASTS:  The patient is status post left-sided lumpectomy.  The scar looks good.  No further palpable masses.  Right breast normal, no palpable masses.  Right and left axillae negative.  GASTROINTESTINAL:  Abdomen soft and nontender.  No hepatosplenomegaly.  EXTREMITIES:  Legs are without tenderness or edema.  NEUROLOGIC:  The patient is alert and oriented x3.    DATA REVIEW:  Bilateral mammogram was done on 2021 and that was negative.  She is due for a DEXA scan in .    ASSESSMENT AND PLAN:  This is a 66-year-old patient with a history of left-sided breast cancer.  She is now 10 years out from that diagnosis.  It was estrogen receptor positive, progesterone receptor weakly positive, HER2 receptor negative.  She is off all hormonal therapy at this point.  She is going to see me on a 1-2 year basis.  I have written her up for her next mammogram and also bone density.  I am happy with how she is doing.    Joyce Barillas MD        D: 2021   T: 2021   MT: CHARLENE    Name:     DONATO FLORES  MRN:      [id number]        Account:    [id number]   :      1955           Visit Date: 2021     Document: [id number]      Again, thank you for  allowing me to participate in the care of your patient.        Sincerely,        Joyce Barillas MD

## 2021-11-11 NOTE — PROGRESS NOTES
"Oncology Rooming Note    November 11, 2021 8:00 AM   Mago Santiago is a 66 year old female who presents for:    Chief Complaint   Patient presents with     Oncology Clinic Visit     Malignant neoplasm of central portion of left female breast     Initial Vitals: /63   Pulse 67   Temp 97.2  F (36.2  C) (Tympanic)   Resp 16   Wt 71.6 kg (157 lb 14.4 oz)   SpO2 96%   BMI 24.01 kg/m   Estimated body mass index is 24.01 kg/m  as calculated from the following:    Height as of 9/8/21: 1.727 m (5' 8\").    Weight as of this encounter: 71.6 kg (157 lb 14.4 oz). Body surface area is 1.85 meters squared.  No Pain (0) Comment: Data Unavailable   No LMP recorded. Patient is postmenopausal.  Allergies reviewed: Yes  Medications reviewed: Yes    Medications: Medication refills not needed today.  Pharmacy name entered into [employer]:    [id number] - [place], [place] - [address] AT [id number] - [place], [place] - [address]  AT [facility]      Tosha Patton CMA              "

## 2021-11-12 NOTE — PROGRESS NOTES
Visit Date: 11/11/2021    Mago Santiago is a 66-year-old patient who is here today for interim followup.    CHIEF COMPLAINT:     1.  Left-sided breast cancer diagnosed in 2011.  2.  Now 10 years out from her diagnosis.    HISTORY OF PRESENT ILLNESS:  Mago is 66 years old and postmenopausal.  She was diagnosed with left-sided breast cancer in 2011.  It was an infiltrating ductal carcinoma, estrogen receptor positive, progesterone weakly positive, HER2 receptor negative.  She finished up adjuvant hormonal therapy and is now on yearly observation.  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  She comes in today for interim followup.  She is feeling well without any major problems.  She denies cough, shortness of breath, bone pain, or any worrisome symptomatology from my standpoint.    REVIEW OF SYSTEMS:  A 10-point comprehensive review of systems is otherwise unremarkable.    MEDICATIONS AND ALLERGIES:  Outlined in the nursing records.    PAIN ASSESSMENT:  She is in no pain today.    SOCIAL HISTORY:  She lives alone.  She has a daughter who lives in [place].  She is a nonsmoker.    PHYSICAL EXAMINATION:    GENERAL:  She is well-appearing lady in no acute distress.  VITAL SIGNS:  Stable.  NECK:  Has no masses or goiter.  CHEST:  Clear to auscultation and percussion bilaterally.  HEART:  Sounds 1 and 2 normal, no added sounds, no murmurs.  BREASTS:  The patient is status post left-sided lumpectomy.  The scar looks good.  No further palpable masses.  Right breast normal, no palpable masses.  Right and left axillae negative.  GASTROINTESTINAL:  Abdomen soft and nontender.  No hepatosplenomegaly.  EXTREMITIES:  Legs are without tenderness or edema.  NEUROLOGIC:  The patient is alert and oriented x3.    DATA REVIEW:  Bilateral mammogram was done on 04/12/2021 and that was negative.  She is due for a DEXA scan in 2022.    ASSESSMENT AND PLAN:  This is a 66-year-old patient with a history of  left-sided breast cancer.  She is now 10 years out from that diagnosis.  It was estrogen receptor positive, progesterone receptor weakly positive, HER2 receptor negative.  She is off all hormonal therapy at this point.  She is going to see me on a 1-2 year basis.  I have written her up for her next mammogram and also bone density.  I am happy with how she is doing.    Joyce Barillas MD        D: 2021   T: 2021   MT: CHARLENE    Name:     DONATO FLORES  MRN:      [id number]        Account:    [id number]   :      1955           Visit Date: 2021     Document: [id number]

## 2021-12-27 ENCOUNTER — PATIENT OUTREACH (OUTPATIENT)
Dept: ONCOLOGY | Facility: CLINIC | Age: 66
End: 2021-12-27
Payer: COMMERCIAL

## 2021-12-27 NOTE — LETTER
Mago Santiago  [address] [place] [address]          December 27, 2021    Dear Mago:    Our clinic records indicate we have attempted to contact you three (3) or more times to schedule a follow up appointment. Unfortunately, we have been unable to reach you. To prevent further delays in your care, please contact our office to schedule your appointment.      Sincerely,     Karena Small, RN, BSN, OCN  Nurse Care Coordinator  [facility] [place]  P: [phone]     F: [phone]

## 2021-12-27 NOTE — PROGRESS NOTES
has attempted to contact Mago (3) times to schedule follow-up. Writer mailed letter to Mago requesting a call to the clinic.    Karena Small, RN, BSN, OCN  Nurse Care Coordinator  [facility] [place]  P: [phone]     F: [phone]

## 2022-01-10 NOTE — PATIENT INSTRUCTIONS
RTC MD 1-2 years  Mammogram 4/2022  Bone density 4/2022    6 voicemails left and letter sent to attempt scheduling    Karena Small, RN, BSN, OCN  Nurse Care Coordinator  [facility] [place]  P: [phone]     F: [phone]

## 2022-04-13 NOTE — PROGRESS NOTES
ASSESSMENT & PLAN  Patient Instructions     1. Pain in both knees, unspecified chronicity      -Patient is following up for bilateral chronic knee pain due to degeneration  -Patient tolerated bilateral cortisone injection today without complications.  Patient was given postprocedure instructions  -Patient may purchase over-the-counter Voltaren gel for mild to moderate pain.  Patient may take oral Tylenol for moderate to severe pain  -Patient will continue with home exercise program  -Patient may wear a compression sleeve or over-the-counter brace while hiking  -Patient had further questions regarding next of treatment options.  We discussed possible viscosupplementation versus PRP or platelet rich plasma injections.  Patient was informed that PRP is not covered by insurance and will cost $800 per treatment.  Patient may need surgery in the future if she no longer response to conservative treatments.  -Patient will follow up when pain returns.    -Call direct clinic number [[phone]] at any time with questions or concerns.    Albert Yeo MD [facility]  [facility]          -----    SUBJECTIVE:  Mago Santiago is a 66 year old female who is seen in follow-up for bilateral knee pain.They were last seen 9/8/2021 with Dr. Abdirizak Light    Since their last visit reports worsening pain. They indicate that their current pain level is 6/10. They have tried corticosteroid injection (most recent date: 9/8/2021) that provided  3 month(s) of relief, physical therapy in 2019.      The patient is seen by themselves.    Patient's past medical, surgical, social, and family histories were reviewed today and no changes are noted.    REVIEW OF SYSTEMS:  Constitutional: NEGATIVE for fever, chills, change in weight  Skin: NEGATIVE for worrisome rashes, moles or lesions  GI/: NEGATIVE for bowel or bladder changes  Neuro: NEGATIVE for weakness, dizziness or  paresthesias    OBJECTIVE:  Wt 71.2 kg (157 lb)   BMI 23.87 kg/m     General: healthy, alert and in no distress  HEENT: no scleral icterus or conjunctival erythema  Skin: no suspicious lesions or rash. No jaundice.  CV: regular rhythm by palpation, no pedal edema  Resp: normal respiratory effort without conversational dyspnea   Psych: normal mood and affect  Gait: normal steady gait with appropriate coordination and balance  Neuro: normal light touch sensory exam of the extremities.    MSK:  BILATERAL KNEE  Inspection:    Normal alignment; no edema, erythema, or ecchymosis present  Palpation:    Tender about the lateral patellar facet, medial patellar facet and medial joint line. Remainder of bony and ligamentous landmarks are nontender.    Trace effusion is present    Patellofemoral crepitus is Absent  Range of Motion:     00 extension to 1200 flexion  Strength:    Quadriceps grossly intact    Extensor mechanism intact  Special Tests:    Positive: none    Negative: MCL/valgus stress (0 & 30 deg), LCL/varus stress (0 & 30 deg), Lachman's, anterior drawer, posterior drawer, Dacia's        Independent visualization of the below image:        Large Joint Injection/Arthocentesis: bilateral knee    Date/Time: 4/18/2022 10:24 AM  Performed by: Yeo, Albert, MD  Authorized by: Yeo, Albert, MD     Needle Size:  25 G  Guidance: ultrasound    Location:  Knee  Laterality:  Bilateral      Medications (Right):  4 mL lidocaine 1 %; 40 mg methylPREDNISolone 40 MG/ML  Medications (Left):  40 mg methylPREDNISolone 40 MG/ML; 4 mL lidocaine 1 %  Outcome:  Tolerated well, no immediate complications  Procedure discussed: discussed risks, benefits, and alternatives    Consent Given by:  Patient  Timeout: timeout called immediately prior to procedure    Prep: patient was prepped and draped in usual sterile fashion        Patient's conditions were thoroughly discussed during today's visit with total time spent face-to-face with the  patient and documentation being 30 minutes.    Albert Yeo MD, CAQSM  [facility]

## 2022-04-18 ENCOUNTER — OFFICE VISIT (OUTPATIENT)
Dept: ORTHOPEDICS | Facility: CLINIC | Age: 67
End: 2022-04-18
Payer: COMMERCIAL

## 2022-04-18 VITALS — WEIGHT: 157 LBS | BODY MASS INDEX: 23.87 KG/M2

## 2022-04-18 DIAGNOSIS — M25.561 PAIN IN BOTH KNEES, UNSPECIFIED CHRONICITY: Primary | ICD-10-CM

## 2022-04-18 DIAGNOSIS — M25.562 PAIN IN BOTH KNEES, UNSPECIFIED CHRONICITY: Primary | ICD-10-CM

## 2022-04-18 PROCEDURE — 99214 OFFICE O/P EST MOD 30 MIN: CPT | Mod: 25 | Performed by: FAMILY MEDICINE

## 2022-04-18 PROCEDURE — 20611 DRAIN/INJ JOINT/BURSA W/US: CPT | Mod: 50 | Performed by: FAMILY MEDICINE

## 2022-04-18 RX ORDER — LIDOCAINE HYDROCHLORIDE 10 MG/ML
4 INJECTION, SOLUTION INFILTRATION; PERINEURAL
Status: DISCONTINUED | OUTPATIENT
Start: 2022-04-18 | End: 2022-06-22

## 2022-04-18 RX ORDER — METHYLPREDNISOLONE ACETATE 40 MG/ML
40 INJECTION, SUSPENSION INTRA-ARTICULAR; INTRALESIONAL; INTRAMUSCULAR; SOFT TISSUE
Status: DISCONTINUED | OUTPATIENT
Start: 2022-04-18 | End: 2022-06-22

## 2022-04-18 RX ADMIN — LIDOCAINE HYDROCHLORIDE 4 ML: 10 INJECTION, SOLUTION INFILTRATION; PERINEURAL at 10:24

## 2022-04-18 RX ADMIN — METHYLPREDNISOLONE ACETATE 40 MG: 40 INJECTION, SUSPENSION INTRA-ARTICULAR; INTRALESIONAL; INTRAMUSCULAR; SOFT TISSUE at 10:24

## 2022-04-18 NOTE — PATIENT INSTRUCTIONS
1. Pain in both knees, unspecified chronicity      -Patient is following up for bilateral chronic knee pain due to degeneration  -Patient tolerated bilateral cortisone injection today without complications.  Patient was given postprocedure instructions  -Patient may purchase over-the-counter Voltaren gel for mild to moderate pain.  Patient may take oral Tylenol for moderate to severe pain  -Patient will continue with home exercise program  -Patient may wear a compression sleeve or over-the-counter brace while hiking  -Patient had further questions regarding next of treatment options.  We discussed possible viscosupplementation versus PRP or platelet rich plasma injections.  Patient was informed that PRP is not covered by insurance and will cost $800 per treatment.  Patient may need surgery in the future if she no longer response to conservative treatments.  -Patient will follow up when pain returns.    -Call direct clinic number [[phone]] at any time with questions or concerns.    Albert Yeo MD [facility]  [facility]

## 2022-04-18 NOTE — LETTER
4/18/2022         RE: Mago Santiago  [address] [place] [address]        Dear Colleague,    Thank you for referring your patient, Mago Santiago, to the [facility] [place]. Please see a copy of my visit note below.    ASSESSMENT & PLAN  Patient Instructions     1. Pain in both knees, unspecified chronicity      -Patient is following up for bilateral chronic knee pain due to degeneration  -Patient tolerated bilateral cortisone injection today without complications.  Patient was given postprocedure instructions  -Patient may purchase over-the-counter Voltaren gel for mild to moderate pain.  Patient may take oral Tylenol for moderate to severe pain  -Patient will continue with home exercise program  -Patient may wear a compression sleeve or over-the-counter brace while hiking  -Patient had further questions regarding next of treatment options.  We discussed possible viscosupplementation versus PRP or platelet rich plasma injections.  Patient was informed that PRP is not covered by insurance and will cost $800 per treatment.  Patient may need surgery in the future if she no longer response to conservative treatments.  -Patient will follow up when pain returns.    -Call direct clinic number [[phone]] at any time with questions or concerns.    Albert Yeo MD [facility]  [facility] [place]          -----    SUBJECTIVE:  Mago Santiago is a 66 year old female who is seen in follow-up for bilateral knee pain.They were last seen 9/8/2021 with Dr. Abdirizak Light    Since their last visit reports worsening pain. They indicate that their current pain level is 6/10. They have tried corticosteroid injection (most recent date: 9/8/2021) that provided  3 month(s) of relief, physical therapy in 2019.      The patient is seen by themselves.    Patient's past medical, surgical, social, and family histories were reviewed today and no  changes are noted.    REVIEW OF SYSTEMS:  Constitutional: NEGATIVE for fever, chills, change in weight  Skin: NEGATIVE for worrisome rashes, moles or lesions  GI/: NEGATIVE for bowel or bladder changes  Neuro: NEGATIVE for weakness, dizziness or paresthesias    OBJECTIVE:  Wt 71.2 kg (157 lb)   BMI 23.87 kg/m     General: healthy, alert and in no distress  HEENT: no scleral icterus or conjunctival erythema  Skin: no suspicious lesions or rash. No jaundice.  CV: regular rhythm by palpation, no pedal edema  Resp: normal respiratory effort without conversational dyspnea   Psych: normal mood and affect  Gait: normal steady gait with appropriate coordination and balance  Neuro: normal light touch sensory exam of the extremities.    MSK:  BILATERAL KNEE  Inspection:    Normal alignment; no edema, erythema, or ecchymosis present  Palpation:    Tender about the lateral patellar facet, medial patellar facet and medial joint line. Remainder of bony and ligamentous landmarks are nontender.    Trace effusion is present    Patellofemoral crepitus is Absent  Range of Motion:     00 extension to 1200 flexion  Strength:    Quadriceps grossly intact    Extensor mechanism intact  Special Tests:    Positive: none    Negative: MCL/valgus stress (0 & 30 deg), LCL/varus stress (0 & 30 deg), Lachman's, anterior drawer, posterior drawer, Dacia's        Independent visualization of the below image:        Large Joint Injection/Arthocentesis: bilateral knee    Date/Time: 4/18/2022 10:24 AM  Performed by: Yeo, Albert, MD  Authorized by: Yeo, Albert, MD     Needle Size:  25 G  Guidance: ultrasound    Location:  Knee  Laterality:  Bilateral      Medications (Right):  4 mL lidocaine 1 %; 40 mg methylPREDNISolone 40 MG/ML  Medications (Left):  40 mg methylPREDNISolone 40 MG/ML; 4 mL lidocaine 1 %  Outcome:  Tolerated well, no immediate complications  Procedure discussed: discussed risks, benefits, and alternatives    Consent Given by:   Patient  Timeout: timeout called immediately prior to procedure    Prep: patient was prepped and draped in usual sterile fashion        Patient's conditions were thoroughly discussed during today's visit with total time spent face-to-face with the patient and documentation being 30 minutes.    Albert Yeo MD, [facility]            Again, thank you for allowing me to participate in the care of your patient.        Sincerely,        Albert Yeo, MD

## 2022-04-25 ENCOUNTER — ANCILLARY PROCEDURE (OUTPATIENT)
Dept: BONE DENSITY | Facility: CLINIC | Age: 67
End: 2022-04-25
Attending: INTERNAL MEDICINE
Payer: COMMERCIAL

## 2022-04-25 PROCEDURE — 77080 DXA BONE DENSITY AXIAL: CPT | Performed by: INTERNAL MEDICINE

## 2022-04-26 PROBLEM — J02.9 ACUTE SORE THROAT: Status: RESOLVED | Noted: 2021-09-02 | Resolved: 2022-04-26

## 2022-06-22 ENCOUNTER — VIRTUAL VISIT (OUTPATIENT)
Dept: INTERNAL MEDICINE | Facility: CLINIC | Age: 67
End: 2022-06-22
Payer: COMMERCIAL

## 2022-06-22 DIAGNOSIS — J02.9 ACUTE PHARYNGITIS, UNSPECIFIED ETIOLOGY: Primary | ICD-10-CM

## 2022-06-22 DIAGNOSIS — Z00.00 PREVENTATIVE HEALTH CARE: ICD-10-CM

## 2022-06-22 PROBLEM — Z85.3 HX: BREAST CANCER: Status: ACTIVE | Noted: 2021-09-02

## 2022-06-22 PROCEDURE — 99214 OFFICE O/P EST MOD 30 MIN: CPT | Mod: 95 | Performed by: INTERNAL MEDICINE

## 2022-06-22 RX ORDER — NYSTATIN 100000/ML
500000 SUSPENSION, ORAL (FINAL DOSE FORM) ORAL 4 TIMES DAILY
Qty: 200 ML | Refills: 0 | Status: SHIPPED | OUTPATIENT
Start: 2022-06-22 | End: 2022-07-02

## 2022-06-22 NOTE — PROGRESS NOTES
"Mago is a 66 year old who is being evaluated via a billable video visit.      How would you like to obtain your AVS? MyChart  If the video visit is dropped, the invitation should be resent by: Text to cell phone: [phone]  Will anyone else be joining your video visit? No  {If patient encounters technical issues they should call [phone] :848941}        {PROVIDER CHARTING PREFERENCE:552544}    Subjective   Mago is a 66 year old, presenting for the following health issues:  Pharyngitis (Ongoing for 3 weeks - stayed the same. Worse in the morning. )      HPI     Acute Illness  Acute illness concerns: Sore throat  Onset/Duration: 3weeks  Symptoms:  Fever: no  Chills/Sweats: no  Headache (location?): no  Sinus Pressure: no  Conjunctivitis:  no  Ear Pain: no  Rhinorrhea: no  Congestion: no  Sore Throat: YES- Going on for 3 weeks. Worse in the morning.  Cough: no  Wheeze: no  Decreased Appetite: no  Nausea: no  Vomiting: no  Diarrhea: no  Dysuria/Freq.: no  Dysuria or Hematuria: no  Fatigue/Achiness: no  Sick/Strep Exposure: no  Therapies tried and outcome: None  {additonal problems for provider to add (Optional):114690}    Review of Systems   {ROS COMP (Optional):813380}      Objective           Vitals:  No vitals were obtained today due to virtual visit.    Physical Exam   {video visit exam brief selected:323433::\"GENERAL: Healthy, alert and no distress\",\"EYES: Eyes grossly normal to inspection.  No discharge or erythema, or obvious scleral/conjunctival abnormalities.\",\"RESP: No audible wheeze, cough, or visible cyanosis.  No visible retractions or increased work of breathing.  \",\"SKIN: Visible skin clear. No significant rash, abnormal pigmentation or lesions.\",\"NEURO: Cranial nerves grossly intact.  Mentation and speech appropriate for age.\",\"PSYCH: Mentation appears normal, affect normal/bright, judgement and insight intact, normal speech and appearance well-groomed.\"}    {Diagnostic Test Results " "(Optional):833578}    {AMBULATORY ATTESTATION (Optional):833571}        Video-Visit Details    Video Start Time: {video visit start/end time for provider to select:129331}    Type of service:  Video Visit    Video End Time:{video visit start/end time for provider to select:334038}    Originating Location (pt. Location): {video visit patient location:821056::\"Home\"}    Distant Location (provider location):  [facility]     Platform used for Video Visit: {Virtual Visit Platforms:553538::\"Kudoala\"}    .  ..  "

## 2022-06-22 NOTE — PATIENT INSTRUCTIONS
Nystatin swish and swallow 3-4 times daily 7 to 10 days    Omeprazole 20 mg daily    Strep culture if no improvement    Follow-up primary care at previously scheduled July 11 office visit    ENT if no improvement thereafter

## 2022-06-22 NOTE — PROGRESS NOTES
Mago is a 66 year old female contacting the clinic today via video, who will use the platform: [employer] for the visit.  Phone # for [employer], or if Amwell drops:   Telephone Information:   Mobile [phone]          ASSESSMENT and PLAN:  1. Acute pharyngitis, unspecified etiology  Trial of acid suppression for possible reflux laryngitis.  With recent cortisone injections in April and intermittent use of antibiotics consider yeast esophagitis or yeast pharyngitis.  Trial of nystatin.  Treat simple things first.  If no resolution strep culture.  Follow-up with PCP.  Consider ENT  - omeprazole (PRILOSEC) 20 MG DR capsule; Take 1 capsule (20 mg) by mouth daily  Dispense: 30 capsule; Refill: 11  - nystatin (MYCOSTATIN) 834151 UNIT/ML suspension; Take 5 mLs (500,000 Units) by mouth 4 times daily for 10 days  Dispense: 200 mL; Refill: 0  - Streptococcus A Rapid Scr w Reflx to PCR - Lab Collect; Future  - Adult ENT  Referral; Future    2. Preventative health care  Upcoming physical examination July 11  - REVIEW OF HEALTH MAINTENANCE PROTOCOL ORDERS     Patient Instructions   Nystatin swish and swallow 3-4 times daily 7 to 10 days    Omeprazole 20 mg daily    Strep culture if no improvement    Follow-up primary care at previously scheduled July 11 office visit    ENT if no improvement thereafter            Return in about 1 year (around 6/22/2023) for using a video visit.       CHIEF COMPLAINT:  Chief Complaint   Patient presents with     Pharyngitis     Ongoing for 3 weeks - stayed the same. Worse in the morning.        HISTORY OF PRESENT ILLNESS:  Mago is a 66 year old female contacting the clinic today via video for complaints of pharyngitis.  She reports a sore throat present for approximately the last 4 weeks.  No fever, chills, or sinus purulence.  History of sinus infections but does not feel she has sinus infection now although she does have slight posterior nasal drainage.  She received bilateral knee  "injections with methylprednisolone on April 22.  She has had antibiotics for sinus infections last documented in September.  History of breast cancer 10 years ago.  Otherwise no medications that are new and mostly takes homeopathic medicines and vitamins.  No exposure to strep.  Occasional acid reflux    REVIEW OF SYSTEMS:   Symptoms slightly worse left side    PFSH:  Social History     Social History Narrative     Not on file       TOBACCO USE:  History   Smoking Status     Never Smoker   Smokeless Tobacco     Never Used       VITALS:  There were no vitals filed for this visit.  There were no vitals taken for this visit. Estimated body mass index is 23.87 kg/m  as calculated from the following:    Height as of 9/8/21: 1.727 m (5' 8\").    Weight as of 4/18/22: 71.2 kg (157 lb).    PHYSICAL EXAM:  (observations via Video)  Alert and oriented    MEDICATIONS:   Current Outpatient Medications   Medication Sig Dispense Refill     Magnesium 400 MG CAPS 400 mg daily       multivitamin, therapeutic (THERA-VIT) TABS Take 1 tablet by mouth daily       NONFORMULARY Tumeric       nystatin (MYCOSTATIN) 210553 UNIT/ML suspension Take 5 mLs (500,000 Units) by mouth 4 times daily for 10 days 200 mL 0     omeprazole (PRILOSEC) 20 MG DR capsule Take 1 capsule (20 mg) by mouth daily 30 capsule 11     VITAMIN D, CHOLECALCIFEROL, PO Take 1,000 Units by mouth daily         Outside Notes summarized:   Labs, x-rays, cardiology, GI tests reviewed:   Recent Labs   Lab Test 11/05/20  1040   HGB 11.3*      POTASSIUM 4.2   CR 0.89     No results found for: [id number]  Lab Results   Component Value Date    VITDT 24 11/03/2015     Lab Results   Component Value Date    CHOL 173 10/22/2019     New orders:   Orders Placed This Encounter   Procedures     REVIEW OF HEALTH MAINTENANCE PROTOCOL ORDERS     Adult ENT  Referral       Independent review of:    Supplemental history by:      Patient has given verbal consent to a Video visit?  " Yes  How would you like to obtain your AVS?  MyChart  Will anyone else be joining your video visit? No       Video-Visit Details  Type of service:  Video Visit  Originating Location (pt. Location): Home  Distant Location (provider location):   [facility]    Alonso Floyd MD  [facility]    Video Start Time: 5:02 PM  Video End time:  5:34 PM  Face to face plus orders: 32 minutes  Documentation time:  3 minutes     The visit lasted a total of 35 minutes

## 2022-06-26 ENCOUNTER — HEALTH MAINTENANCE LETTER (OUTPATIENT)
Age: 67
End: 2022-06-26

## 2022-07-11 ENCOUNTER — OFFICE VISIT (OUTPATIENT)
Dept: PEDIATRICS | Facility: CLINIC | Age: 67
End: 2022-07-11
Payer: COMMERCIAL

## 2022-07-11 VITALS
DIASTOLIC BLOOD PRESSURE: 64 MMHG | TEMPERATURE: 98 F | HEART RATE: 73 BPM | HEIGHT: 68 IN | BODY MASS INDEX: 22.51 KG/M2 | WEIGHT: 148.5 LBS | RESPIRATION RATE: 20 BRPM | OXYGEN SATURATION: 98 % | SYSTOLIC BLOOD PRESSURE: 120 MMHG

## 2022-07-11 DIAGNOSIS — Z00.00 ENCOUNTER FOR MEDICARE ANNUAL WELLNESS EXAM: Primary | ICD-10-CM

## 2022-07-11 DIAGNOSIS — C50.112 MALIGNANT NEOPLASM OF CENTRAL PORTION OF LEFT FEMALE BREAST, UNSPECIFIED ESTROGEN RECEPTOR STATUS (H): ICD-10-CM

## 2022-07-11 DIAGNOSIS — J02.9 SORE THROAT: ICD-10-CM

## 2022-07-11 DIAGNOSIS — J34.89 SINUS PRESSURE: ICD-10-CM

## 2022-07-11 PROCEDURE — 99397 PER PM REEVAL EST PAT 65+ YR: CPT | Performed by: INTERNAL MEDICINE

## 2022-07-11 PROCEDURE — 99213 OFFICE O/P EST LOW 20 MIN: CPT | Mod: 25 | Performed by: INTERNAL MEDICINE

## 2022-07-11 RX ORDER — FLUTICASONE PROPIONATE 50 MCG
1 SPRAY, SUSPENSION (ML) NASAL DAILY
Qty: 16 ML | Refills: 1 | Status: SHIPPED | OUTPATIENT
Start: 2022-07-11 | End: 2024-08-14

## 2022-07-11 SDOH — HEALTH STABILITY: PHYSICAL HEALTH: ON AVERAGE, HOW MANY DAYS PER WEEK DO YOU ENGAGE IN MODERATE TO STRENUOUS EXERCISE (LIKE A BRISK WALK)?: 7 DAYS

## 2022-07-11 SDOH — ECONOMIC STABILITY: FOOD INSECURITY: WITHIN THE PAST 12 MONTHS, THE FOOD YOU BOUGHT JUST DIDN'T LAST AND YOU DIDN'T HAVE MONEY TO GET MORE.: NEVER TRUE

## 2022-07-11 SDOH — ECONOMIC STABILITY: TRANSPORTATION INSECURITY
IN THE PAST 12 MONTHS, HAS THE LACK OF TRANSPORTATION KEPT YOU FROM MEDICAL APPOINTMENTS OR FROM GETTING MEDICATIONS?: NO

## 2022-07-11 SDOH — ECONOMIC STABILITY: FOOD INSECURITY: WITHIN THE PAST 12 MONTHS, YOU WORRIED THAT YOUR FOOD WOULD RUN OUT BEFORE YOU GOT MONEY TO BUY MORE.: NEVER TRUE

## 2022-07-11 SDOH — ECONOMIC STABILITY: INCOME INSECURITY: IN THE LAST 12 MONTHS, WAS THERE A TIME WHEN YOU WERE NOT ABLE TO PAY THE MORTGAGE OR RENT ON TIME?: NO

## 2022-07-11 SDOH — ECONOMIC STABILITY: INCOME INSECURITY: HOW HARD IS IT FOR YOU TO PAY FOR THE VERY BASICS LIKE FOOD, HOUSING, MEDICAL CARE, AND HEATING?: NOT HARD AT ALL

## 2022-07-11 SDOH — ECONOMIC STABILITY: TRANSPORTATION INSECURITY
IN THE PAST 12 MONTHS, HAS LACK OF TRANSPORTATION KEPT YOU FROM MEETINGS, WORK, OR FROM GETTING THINGS NEEDED FOR DAILY LIVING?: NO

## 2022-07-11 SDOH — HEALTH STABILITY: PHYSICAL HEALTH: ON AVERAGE, HOW MANY MINUTES DO YOU ENGAGE IN EXERCISE AT THIS LEVEL?: 60 MIN

## 2022-07-11 ASSESSMENT — ENCOUNTER SYMPTOMS
SORE THROAT: 1
HEMATURIA: 0
HEMATOCHEZIA: 0
PARESTHESIAS: 0
MYALGIAS: 0
NAUSEA: 0
FREQUENCY: 0
HEARTBURN: 1
COUGH: 1
JOINT SWELLING: 0
SHORTNESS OF BREATH: 0
BREAST MASS: 0
HEADACHES: 1
EYE PAIN: 0
ARTHRALGIAS: 0
PALPITATIONS: 0
DYSURIA: 0
CHILLS: 0
NERVOUS/ANXIOUS: 0
WEAKNESS: 0
CONSTIPATION: 0
DIZZINESS: 0
ABDOMINAL PAIN: 1
DIARRHEA: 0
FEVER: 0

## 2022-07-11 ASSESSMENT — LIFESTYLE VARIABLES
AUDIT-C TOTAL SCORE: 2
SKIP TO QUESTIONS 9-10: 1
HOW OFTEN DO YOU HAVE A DRINK CONTAINING ALCOHOL: 2-4 TIMES A MONTH
HOW MANY STANDARD DRINKS CONTAINING ALCOHOL DO YOU HAVE ON A TYPICAL DAY: 1 OR 2
HOW OFTEN DO YOU HAVE SIX OR MORE DRINKS ON ONE OCCASION: NEVER

## 2022-07-11 ASSESSMENT — PAIN SCALES - GENERAL: PAINLEVEL: SEVERE PAIN (6)

## 2022-07-11 ASSESSMENT — SOCIAL DETERMINANTS OF HEALTH (SDOH)
DO YOU BELONG TO ANY CLUBS OR ORGANIZATIONS SUCH AS CHURCH GROUPS UNIONS, FRATERNAL OR ATHLETIC GROUPS, OR SCHOOL GROUPS?: NO
IN A TYPICAL WEEK, HOW MANY TIMES DO YOU TALK ON THE PHONE WITH FAMILY, FRIENDS, OR NEIGHBORS?: MORE THAN THREE TIMES A WEEK
HOW OFTEN DO YOU ATTEND CHURCH OR RELIGIOUS SERVICES?: NEVER
HOW OFTEN DO YOU GET TOGETHER WITH FRIENDS OR RELATIVES?: TWICE A WEEK

## 2022-07-11 ASSESSMENT — ACTIVITIES OF DAILY LIVING (ADL): CURRENT_FUNCTION: NO ASSISTANCE NEEDED

## 2022-07-11 NOTE — PROGRESS NOTES
"SUBJECTIVE:   Mago Santiago is a 66 year old female who presents for Preventive Visit.      Patient has been advised of split billing requirements and indicates understanding: Yes  Are you in the first 12 months of your Medicare coverage?  No    Healthy Habits:     In general, how would you rate your overall health?  Good    Frequency of exercise:  6-7 days/week    Duration of exercise:  45-60 minutes    Do you usually eat at least 4 servings of fruit and vegetables a day, include whole grains    & fiber and avoid regularly eating high fat or \"junk\" foods?  No    Taking medications regularly:  Yes    Medication side effects:  None    Ability to successfully perform activities of daily living:  No assistance needed    Home Safety:  No safety concerns identified    Hearing Impairment:  No hearing concerns    In the past 6 months, have you been bothered by leaking of urine? Yes    In general, how would you rate your overall mental or emotional health?  Good      PHQ-2 Total Score: 0    Additional concerns today:  Yes    Do you feel safe in your environment? Yes    Have you ever done Advance Care Planning? (For example, a Health Directive, POLST, or a discussion with a medical provider or your loved ones about your wishes): No, advance care planning information given to patient to review.  Patient declined advance care planning discussion at this time.       Fall risk  Fallen 2 or more times in the past year?: No  Any fall with injury in the past year?: No    Cognitive Screening   1) Repeat 3 items (Leader, Season, Table)    2) Clock draw: NORMAL  3) 3 item recall: Recalls 3 objects  Results: 3 items recalled: COGNITIVE IMPAIRMENT LESS LIKELY    Mini-CogTM Copyright RICHARD Arias. Licensed by the author for use in [facility]; reprinted with permission (sabino@.[facility]). All rights reserved.          Ongoing sore throat since beginning of June. Omeprazole hasn't helped significantly. Has had sinus pressure but " this is getting better with NetiPot use. Trying some dietary changes with minimal success.     Reviewed and updated as needed this visit by clinical staff   Tobacco  Allergies  Meds   Med Hx  Surg Hx  Fam Hx  Soc Hx          Reviewed and updated as needed this visit by Provider     Meds               Social History     Tobacco Use     Smoking status: Never Smoker     Smokeless tobacco: Never Used   Substance Use Topics     Alcohol use: Yes     Alcohol/week: 0.0 standard drinks     Comment: Seldom         Alcohol Use 7/11/2022   Prescreen: >3 drinks/day or >7 drinks/week? No   Prescreen: >3 drinks/day or >7 drinks/week? -           Current providers sharing in care for this patient include:   Patient Care Team:  Hanny Kunz MD as PCP - General (Internal Medicine)  Joyce Barillas MD as MD (Hematology)  Karena Tafoya RN as Continuity Care Coordinator  Joyce Barillas MD as Assigned Cancer Care Provider  Hanny Kunz MD as Assigned PCP  Yeo, Albert, MD as Assigned Musculoskeletal Provider    The following health maintenance items are reviewed in Epic and correct as of today:  Health Maintenance Due   Topic Date Due     MICROALBUMIN  Never done     LIPID  10/22/2020     BMP  11/05/2021     MEDICARE ANNUAL WELLNESS VISIT  04/12/2022     Patient Active Problem List   Diagnosis     Advanced directives, counseling/discussion     Malignant neoplasm of central portion of left female breast (HCC)     Family history of multiple sclerosis     Generalized anxiety disorder     Celiac disease     CKD (chronic kidney disease) stage 2, GFR 60-89 ml/min     Osteopenia of multiple sites     Allergic rhinitis     Anemia of chronic disease     Hereditary and idiopathic peripheral neuropathy     HX: breast cancer     Other specified disorders of rotator cuff syndrome of shoulder and allied disorders     Postmenopausal atrophic vaginitis     Past Surgical History:   Procedure Laterality Date     ENT  SURGERY      wisdom teeth     LASIK  1995    right eye     LUMPECTOMY BREAST  2011    left ; breast cancer        Social History     Tobacco Use     Smoking status: Never Smoker     Smokeless tobacco: Never Used   Substance Use Topics     Alcohol use: Yes     Alcohol/week: 0.0 standard drinks     Comment: Seldom     Family History   Problem Relation Age of Onset     Genetic Disorder Mother         MS     Prostate Cancer Father      Genetic Disorder Sister         MS     Diabetes Daughter         Type 1             Breast CA Risk Assessment (FHS-7) 4/12/2021   Do you have a family history of breast, colon, or ovarian cancer? No / Unknown         Mammogram Screening - Alternate mammogram schedule due to breast cancer history  Pertinent mammograms are reviewed under the imaging tab.    Review of Systems   Constitutional: Negative for chills and fever.   HENT: Positive for congestion and sore throat. Negative for ear pain and hearing loss.    Eyes: Negative for pain and visual disturbance.   Respiratory: Positive for cough. Negative for shortness of breath.    Cardiovascular: Negative for chest pain, palpitations and peripheral edema.   Gastrointestinal: Positive for abdominal pain and heartburn. Negative for constipation, diarrhea, hematochezia and nausea.   Breasts:  Negative for tenderness, breast mass and discharge.   Genitourinary: Positive for urgency. Negative for dysuria, frequency, genital sores, hematuria, pelvic pain, vaginal bleeding and vaginal discharge.   Musculoskeletal: Negative for arthralgias, joint swelling and myalgias.   Skin: Negative for rash.   Neurological: Positive for headaches. Negative for dizziness, weakness and paresthesias.   Psychiatric/Behavioral: Negative for mood changes. The patient is not nervous/anxious.          OBJECTIVE:   /64 (BP Location: Right arm, Patient Position: Sitting, Cuff Size: Adult Regular)   Pulse 73   Temp 98  F (36.7  C) (Temporal)   Resp 20   Ht 1.721  "m (5' 7.75\")   Wt 67.4 kg (148 lb 8 oz)   SpO2 98%   BMI 22.75 kg/m   Estimated body mass index is 23.87 kg/m  as calculated from the following:    Height as of 9/8/21: 1.727 m (5' 8\").    Weight as of 4/18/22: 71.2 kg (157 lb).  Physical Exam  GENERAL: healthy, alert and no distress  EYES: Eyes grossly normal to inspection, PERRL and conjunctivae and sclerae normal  HENT: ear canals and TM's normal, nose and mouth without ulcers or lesions  NECK: no adenopathy, no asymmetry, masses, or scars and thyroid normal to palpation  RESP: lungs clear to auscultation - no rales, rhonchi or wheezes  CV: regular rate and rhythm, normal S1 S2, no S3 or S4, no murmur, click or rub, no peripheral edema and peripheral pulses strong  ABDOMEN: soft, nontender, no hepatosplenomegaly, no masses and bowel sounds normal  MS: no gross musculoskeletal defects noted, no edema  SKIN: no suspicious lesions or rashes  NEURO: Normal strength and tone, mentation intact and speech normal  PSYCH: mentation appears normal, affect normal/bright    Diagnostic Test Results:  Labs reviewed in Epic    ASSESSMENT / PLAN:   1. Encounter for Medicare annual wellness exam  Counseling as below. Labs up to date, renal function last 2 years not consistent with CKD. Will continue to follow/monitor as needed.     2. Sore throat  Anticipate this may be due to incompletely treated GERD. Will increase PPI to BID and have patient take this 30-60 min prior to eating. She will also follow-up with ENT for further evaluation.    - omeprazole (PRILOSEC) 20 MG DR capsule; Take 1 capsule (20 mg) by mouth 2 times daily  Dispense: 60 capsule; Refill: 1  - Adult ENT  Referral; Future    3. Sinus pressure  Continue with Neti-Pot use. Will add in steroid nasal spray.   - fluticasone (FLONASE) 50 MCG/ACT nasal spray; Spray 1 spray into both nostrils daily  Dispense: 16 mL; Refill: 1    4. Malignant neoplasm of central portion of left female breast, unspecified " "estrogen receptor status (H)  In active surveillance. Follows with oncology.           COUNSELING:  Reviewed preventive health counseling, as reflected in patient instructions       Regular exercise       Healthy diet/nutrition       Vision screening       Hearing screening       Osteoporosis prevention/bone health       Colon cancer screening    Estimated body mass index is 23.87 kg/m  as calculated from the following:    Height as of 9/8/21: 1.727 m (5' 8\").    Weight as of 4/18/22: 71.2 kg (157 lb).        She reports that she has never smoked. She has never used smokeless tobacco.      Appropriate preventive services were discussed with this patient, including applicable screening as appropriate for cardiovascular disease, diabetes, osteopenia/osteoporosis, and glaucoma.  As appropriate for age/gender, discussed screening for colorectal cancer, prostate cancer, breast cancer, and cervical cancer. Checklist reviewing preventive services available has been given to the patient.    Reviewed patients plan of care and provided an AVS. The Basic Care Plan (routine screening as documented in Health Maintenance) for Mago meets the Care Plan requirement. This Care Plan has been established and reviewed with the Patient.    Counseling Resources:  ATP IV Guidelines  Pooled Cohorts Equation Calculator  Breast Cancer Risk Calculator  Breast Cancer: Medication to Reduce Risk  FRAX Risk Assessment  ICSI Preventive Guidelines  Dietary Guidelines for Americans, 2010  USDA's MyPlate  ASA Prophylaxis  Lung CA Screening    Hanny Nava MD  [facility]    Identified Health Risks:    The patient was counseled and encouraged to consider modifying their diet and eating habits. She was provided with information on recommended healthy diet options.  Information on urinary incontinence and treatment options given to patient.  "

## 2022-07-11 NOTE — PATIENT INSTRUCTIONS
Throat and sinus issues:  - ENT appointment at [facility]  - take omeprazole twice daily 30-60 min before eating  - Flonase after neti-pot use.   Patient Education   Personalized Prevention Plan  You are due for the preventive services outlined below.  Your care team is available to assist you in scheduling these services.  If you have already completed any of these items, please share that information with your care team to update in your medical record.  Health Maintenance Due   Topic Date Due     Kidney Microalbumin Urine Test  Never done     Cholesterol Lab  10/22/2020     Basic Metabolic Panel  11/05/2021       Understanding USDA MyPlate  The USDA has guidelines to help you make healthy food choices. These are called MyPlate. MyPlate shows the food groups that make up healthy meals using the image of a place setting. Before you eat, think about the healthiest choices for what to put on your plate or in your cup or bowl. To learn more about building a healthy plate, visit www.choosemyplate.gov.    The food groups    Fruits. Any fruit or 100% fruit juice counts as part of the Fruit Group. Fruits may be fresh, canned, frozen, or dried, and may be whole, cut-up, or pureed. Make 1/2 of your plate fruits and vegetables.    Vegetables. Any vegetable or 100% vegetable juice counts as a member of the Vegetable Group. Vegetables may be fresh, frozen, canned, or dried. They can be served raw or cooked and may be whole, cut-up, or mashed. Make 1/2 of your plate fruits and vegetables.    Grains. All foods made from grains are part of the Grains Group. These include wheat, rice, oats, cornmeal, and barley. Grains are often used to make foods such as bread, pasta, oatmeal, cereal, tortillas, and grits. Grains should be no more than 1/4 of your plate. At least half of your grains should be whole grains.    Protein. This group includes meat, poultry, seafood, beans and peas, eggs, processed soy products (such as tofu), nuts  (including nut butters), and seeds. Make protein choices no more than 1/4 of your plate. Meat and poultry choices should be lean or low fat.    Dairy. The Dairy Group includes all fluid milk products and foods made from milk that contain calcium, such as yogurt and cheese. (Foods that have little calcium, such as cream, butter, and cream cheese, are not part of this group.) Most dairy choices should be low-fat or fat-free.    Oils. Oils aren't a food group, but they do contain essential nutrients. However it's important to watch your intake of oils. These are fats that are liquid at room temperature. They include canola, corn, olive, soybean, vegetable, and sunflower oil. Foods that are mainly oil include mayonnaise, certain salad dressings, and soft margarines. You likely already get your daily oil allowance from the foods you eat.  Things to limit  Eating healthy also means limiting these things in your diet:       Salt (sodium). Many processed foods have a lot of sodium. To keep sodium intake down, eat fresh vegetables, meats, poultry, and seafood when possible. Purchase low-sodium, reduced-sodium, or no-salt-added food products at the store. And don't add salt to your meals at home. Instead, season them with herbs and spices such as dill, oregano, cumin, and paprika. Or try adding flavor with lemon or lime zest and juice.    Saturated fat. Saturated fats are most often found in animal products such as beef, pork, and chicken. They are often solid at room temperature, such as butter. To reduce your saturated fat intake, choose leaner cuts of meat and poultry. And try healthier cooking methods such as grilling, broiling, roasting, or baking. For a simple lower-fat swap, use plain nonfat yogurt instead of mayonnaise when making potato salad or macaroni salad.    Added sugars. These are sugars added to foods. They are in foods such as ice cream, candy, soda, fruit drinks, sports drinks, energy drinks, cookies,  pastries, jams, and syrups. Cut down on added sugars by sharing sweet treats with a family member or friend. You can also choose fruit for dessert, and drink water or other unsweetened beverages.     [employer] last reviewed this educational content on 6/1/2020 2000-2021 [employer]. All rights reserved. This information is not intended as a substitute for professional medical care. Always follow your healthcare professional's instructions.          Urinary Incontinence, Female (Adult)   Urinary incontinence means loss of bladder control. This problem affects many women, especially as they get older. If you have incontinence, you may be embarrassed to ask for help. But know that this problem can be treated.   Types of Incontinence  There are different types of incontinence. Two of the main types are described here. You can have more than one type.     Stress incontinence. With this type, urine leaks when pressure (stress) is put on the bladder. This may happen when you cough, sneeze, or laugh. Stress incontinence most often occurs because the pelvic floor muscles that support the bladder and urethra are weak. This can happen after pregnancy and vaginal childbirth or a hysterectomy. It can also be due to excess body weight or hormone changes.    Urge incontinence (also called overactive bladder). With this type, a sudden urge to urinate is felt often. This may happen even though there may not be much urine in the bladder. The need to urinate often during the night is common. Urge incontinence most often occurs because of bladder spasms. This may be due to bladder irritation or infection. Damage to bladder nerves or pelvic muscles, constipation, and certain medicines can also lead to urge incontinence.  Treatment depends on the cause. Further evaluation is needed to find the type you have. This will likely include an exam and certain tests. Based on the results, you and your healthcare provider can then  plan treatment. Until a diagnosis is made, the home care tips below can help ease symptoms.   Home care    Do pelvic floor muscle exercises, if they are prescribed. The pelvic floor muscles help support the bladder and urethra. Many women find that their symptoms improve when doing special exercises that strengthen these muscles. To do the exercises, contract the muscles you would use to stop your stream of urine. But do this when you re not urinating. Hold for 10 seconds, then relax. Repeat 10 to 20 times in a row, at least 3 times a day. Your healthcare provider may give you other instructions for how to do the exercises and how often.    Keep a bladder diary. This helps track how often and how much you urinate over a set period of time. Bring this diary with you to your next visit with the provider. The information can help your provider learn more about your bladder problem.    Lose weight, if advised to by your provider. Extra weight puts pressure on the bladder. Your provider can help you create a weight-loss plan that s right for you. This may include exercising more and making certain diet changes.    Don't have foods and drinks that may irritate the bladder. These can include alcohol and caffeinated drinks.    Quit smoking. Smoking and other tobacco use can lead to a long-term (chronic) cough that strains the pelvic floor muscles. Smoking may also damage the bladder and urethra. Talk with your provider about treatments or methods you can use to quit smoking.    If drinking large amounts of fluid makes you have symptoms, you may be advised to limit your fluid intake. You may also be advised to drink most of your fluids during the day and to limit fluids at night.    If you re worried about urine leakage or accidents, you may wear absorbent pads to catch urine. Change the pads often. This helps reduce discomfort. It may also reduce the risk of skin or bladder infections.    Follow-up care  Follow up with your  healthcare provider, or as directed. It may take some to find the right treatment for your problem. But healthy lifestyle changes can be made right away. These include such things as exercising on a regular basis, eating a healthy diet, losing weight (if needed), and quitting smoking. Your treatment plan may include special therapies or medicines. Certain procedures or surgery may also be options. Talk about any questions you have with your provider.   When to seek medical advice  Call the healthcare provider right away if any of these occur:    Fever of 100.4 F (38 C) or higher, or as directed by your provider    Bladder pain or fullness    Belly swelling    Nausea or vomiting    Back pain    Weakness, dizziness, or fainting  [employer] last reviewed this educational content on 1/1/2020 2000-2021 [employer]. All rights reserved. This information is not intended as a substitute for professional medical care. Always follow your healthcare professional's instructions.

## 2022-07-20 ENCOUNTER — TRANSFERRED RECORDS (OUTPATIENT)
Dept: PEDIATRICS | Facility: CLINIC | Age: 67
End: 2022-07-20

## 2022-09-06 ENCOUNTER — APPOINTMENT (OUTPATIENT)
Dept: GENERAL RADIOLOGY | Facility: CLINIC | Age: 67
End: 2022-09-06
Attending: EMERGENCY MEDICINE
Payer: COMMERCIAL

## 2022-09-06 ENCOUNTER — HOSPITAL ENCOUNTER (EMERGENCY)
Facility: CLINIC | Age: 67
Discharge: HOME | End: 2022-09-06
Attending: EMERGENCY MEDICINE | Admitting: EMERGENCY MEDICINE
Payer: COMMERCIAL

## 2022-09-06 VITALS
RESPIRATION RATE: 16 BRPM | OXYGEN SATURATION: 100 % | SYSTOLIC BLOOD PRESSURE: 123 MMHG | TEMPERATURE: 97 F | BODY MASS INDEX: 22.82 KG/M2 | WEIGHT: 150.57 LBS | HEART RATE: 56 BPM | HEIGHT: 68 IN | DIASTOLIC BLOOD PRESSURE: 72 MMHG

## 2022-09-06 DIAGNOSIS — R07.9 NONSPECIFIC CHEST PAIN: ICD-10-CM

## 2022-09-06 DIAGNOSIS — M54.9 UPPER BACK PAIN ON LEFT SIDE: ICD-10-CM

## 2022-09-06 LAB
ANION GAP SERPL CALCULATED.3IONS-SCNC: 8 MMOL/L (ref 7–15)
ATRIAL RATE - MUSE: 59 BPM
BASOPHILS # BLD AUTO: 0.1 10E3/UL (ref 0–0.2)
BASOPHILS NFR BLD AUTO: 2 %
BUN SERPL-MCNC: 9.2 MG/DL (ref 8–23)
CALCIUM SERPL-MCNC: 9.8 MG/DL (ref 8.8–10.2)
CHLORIDE SERPL-SCNC: 105 MMOL/L (ref 98–107)
CREAT SERPL-MCNC: 0.91 MG/DL (ref 0.51–0.95)
D DIMER PPP FEU-MCNC: 0.39 UG/ML FEU (ref 0–0.5)
DEPRECATED HCO3 PLAS-SCNC: 28 MMOL/L (ref 22–29)
DIASTOLIC BLOOD PRESSURE - MUSE: NORMAL MMHG
EOSINOPHIL # BLD AUTO: 0.1 10E3/UL (ref 0–0.7)
EOSINOPHIL NFR BLD AUTO: 3 %
ERYTHROCYTE [DISTWIDTH] IN BLOOD BY AUTOMATED COUNT: 12.9 % (ref 10–15)
GFR SERPL CREATININE-BSD FRML MDRD: 68 ML/MIN/1.73M2
GLUCOSE SERPL-MCNC: 99 MG/DL (ref 70–99)
HCT VFR BLD AUTO: 39.4 % (ref 35–47)
HGB BLD-MCNC: 12 G/DL (ref 11.7–15.7)
HOLD SPECIMEN: NORMAL
IMM GRANULOCYTES # BLD: 0 10E3/UL
IMM GRANULOCYTES NFR BLD: 0 %
INTERPRETATION ECG - MUSE: NORMAL
LYMPHOCYTES # BLD AUTO: 1.4 10E3/UL (ref 0.8–5.3)
LYMPHOCYTES NFR BLD AUTO: 41 %
MCH RBC QN AUTO: 26.7 PG (ref 26.5–33)
MCHC RBC AUTO-ENTMCNC: 30.5 G/DL (ref 31.5–36.5)
MCV RBC AUTO: 88 FL (ref 78–100)
MONOCYTES # BLD AUTO: 0.4 10E3/UL (ref 0–1.3)
MONOCYTES NFR BLD AUTO: 10 %
NEUTROPHILS # BLD AUTO: 1.5 10E3/UL (ref 1.6–8.3)
NEUTROPHILS NFR BLD AUTO: 44 %
NRBC # BLD AUTO: 0 10E3/UL
NRBC BLD AUTO-RTO: 0 /100
P AXIS - MUSE: 34 DEGREES
PLATELET # BLD AUTO: 388 10E3/UL (ref 150–450)
POTASSIUM SERPL-SCNC: 4.4 MMOL/L (ref 3.4–5.3)
PR INTERVAL - MUSE: 122 MS
QRS DURATION - MUSE: 70 MS
QT - MUSE: 404 MS
QTC - MUSE: 399 MS
R AXIS - MUSE: 46 DEGREES
RBC # BLD AUTO: 4.5 10E6/UL (ref 3.8–5.2)
SODIUM SERPL-SCNC: 141 MMOL/L (ref 136–145)
SYSTOLIC BLOOD PRESSURE - MUSE: NORMAL MMHG
T AXIS - MUSE: 54 DEGREES
TROPONIN T SERPL HS-MCNC: 8 NG/L
TROPONIN T SERPL HS-MCNC: 8 NG/L
VENTRICULAR RATE- MUSE: 59 BPM
WBC # BLD AUTO: 3.5 10E3/UL (ref 4–11)

## 2022-09-06 PROCEDURE — 93005 ELECTROCARDIOGRAM TRACING: CPT

## 2022-09-06 PROCEDURE — 85379 FIBRIN DEGRADATION QUANT: CPT | Performed by: EMERGENCY MEDICINE

## 2022-09-06 PROCEDURE — 36415 COLL VENOUS BLD VENIPUNCTURE: CPT | Performed by: EMERGENCY MEDICINE

## 2022-09-06 PROCEDURE — 84484 ASSAY OF TROPONIN QUANT: CPT | Performed by: EMERGENCY MEDICINE

## 2022-09-06 PROCEDURE — 71046 X-RAY EXAM CHEST 2 VIEWS: CPT

## 2022-09-06 PROCEDURE — 80048 BASIC METABOLIC PNL TOTAL CA: CPT | Performed by: EMERGENCY MEDICINE

## 2022-09-06 PROCEDURE — 85025 COMPLETE CBC W/AUTO DIFF WBC: CPT | Performed by: EMERGENCY MEDICINE

## 2022-09-06 PROCEDURE — 99285 EMERGENCY DEPT VISIT HI MDM: CPT | Mod: 25

## 2022-09-06 ASSESSMENT — ENCOUNTER SYMPTOMS
WEAKNESS: 0
CHILLS: 0
DIZZINESS: 0
CHEST TIGHTNESS: 1
BACK PAIN: 1
FEVER: 0
NAUSEA: 0
NUMBNESS: 0
SHORTNESS OF BREATH: 0
COUGH: 0
NECK PAIN: 1
ABDOMINAL PAIN: 1

## 2022-09-06 ASSESSMENT — ACTIVITIES OF DAILY LIVING (ADL): ADLS_ACUITY_SCORE: 35

## 2022-09-06 NOTE — ED PROVIDER NOTES
History   Chief Complaint:  Chest Pain       The history is provided by the patient.      Mago Santiago is a 67 year old female with history of CKD who presents with chest tightness. She states that she has been experiencing upper back pain for the past 6 months. About a week ago, she developed neck pain that radiated down her left arm. Along with this, she developed chest tightness right below her breasts. She states that the pain is spontaneous, intermittent, and it waxes and wanes throughout the day. She denies experiencing any other symptoms associated with this pain including fever, chills, cough, congestions, shortness of breath, leg swelling, nausea, dizziness, numbness, weakness, or incontinence. The patient states that she exercises daily but denies any new workout regime. She does  Have a little abdominal pain due to heartburn but is taking medications to treat her symptoms. The patient denies being immobilized for a long period of time during travel or surgery. She does not smoke or take hormone therapy.    Review of Systems   Constitutional: Negative for chills and fever.   HENT: Negative for congestion.    Respiratory: Positive for chest tightness. Negative for cough and shortness of breath.    Cardiovascular: Negative for leg swelling.   Gastrointestinal: Positive for abdominal pain. Negative for nausea.   Musculoskeletal: Positive for back pain and neck pain.   Neurological: Negative for dizziness, weakness and numbness.   All other systems reviewed and are negative.    Allergies:  No Known Allergies    Medications:  Magnesium  Omeprazole    Past Medical History:     Malignant neoplasm, breast  Anxiety  Celiac disease  CKD  Osteopenia  Allergic rhinitis  Cancer  Rotator cuff syndrome  Peripheral neuropathy  Subacromial bursitis    Past Surgical History:    [place] teeth removal  Lasik  Lumpectomy, breast    Family History:    Mother: MS  Father: cancer  Sister: MS  Daughter: diabetes mellitus type  "I    Social History:  The patient presents to the ED alone  PCP: Hanny Kunz     Physical Exam     Patient Vitals for the past 24 hrs:   BP Temp Temp src Pulse Resp SpO2 Height Weight   09/06/22 1010 123/72 -- -- 56 -- 100 % -- --   09/06/22 0757 119/66 97  F (36.1  C) Temporal 58 16 100 % 1.727 m (5' 8\") 68.3 kg (150 lb 9.2 oz)       Physical Exam  General: Adult female sitting upright  Eyes: PERRL, Conjunctive within normal limits  ENT: Moist mucous membranes, oropharynx clear.   Neck: Nontender.  Normal active range of motion.  CV: Normal S1S2, no murmur, rub or gallop. Regular rate and rhythm.  Radial pulses intact bilaterally.  Resp: Clear to auscultation bilaterally, no wheezes, rales or rhonchi. Normal respiratory effort.  GI: Abdomen is soft, nontender and nondistended. No palpable masses. No rebound or guarding.  MSK: No midline neck or thoracic back pain.  Mild tenderness in the left upper thoracic paraspinal musculature.  No palpable edema or mass.  No crepitus.  No extremity edema.  Tender over the lower chest wall bilaterally including the xiphoid process.  No crepitus or bony deformity.  No overlying skin abnormality.  Normal active range of motion of all 4 extremities.  5 out of 5 strength diffusely bilateral upper extremities.  Skin: Warm and dry. No rashes or lesions or ecchymoses on visible skin.  Neuro: Alert and oriented. Responds appropriately to all questions and commands. No focal findings appreciated. Normal muscle tone.  5 out of 5 finger abduction, thumb opposition and wrist extension strength bilaterally.  Sensation intact to light touch over all dermatomes of the bilateral upper extremities  Psych: Normal mood and affect. Pleasant.    Emergency Department Course   ECG  ECG taken at 0806, ECG read at 0939  Sinus bradycardia   Rate 59 bpm. AK interval 122 ms. QRS duration 70 ms. QT/QTc 404/399 ms. P-R-T axes 34 46 54.     Imaging:  Chest XR,  PA & LAT   Final Result   IMPRESSION:  There " are no acute infiltrates. The cardiac silhouette is   not enlarged. Pulmonary vasculature is unremarkable.       DANIKA LEI MD            SYSTEM ID:  [id number]        Report per radiology    Laboratory:  Labs Ordered and Resulted from Time of ED Arrival to Time of ED Departure   CBC WITH PLATELETS AND DIFFERENTIAL - Abnormal       Result Value    WBC Count 3.5 (*)     RBC Count 4.50      Hemoglobin 12.0      Hematocrit 39.4      MCV 88      MCH 26.7      MCHC 30.5 (*)     RDW 12.9      Platelet Count 388      % Neutrophils 44      % Lymphocytes 41      % Monocytes 10      % Eosinophils 3      % Basophils 2      % Immature Granulocytes 0      NRBCs per 100 WBC 0      Absolute Neutrophils 1.5 (*)     Absolute Lymphocytes 1.4      Absolute Monocytes 0.4      Absolute Eosinophils 0.1      Absolute Basophils 0.1      Absolute Immature Granulocytes 0.0      Absolute NRBCs 0.0     BASIC METABOLIC PANEL - Normal    Creatinine 0.91      Sodium 141      Potassium 4.4      Urea Nitrogen 9.2      Chloride 105      Carbon Dioxide (CO2) 28      Anion Gap 8      Glucose 99      GFR Estimate 68      Calcium 9.8     TROPONIN T, HIGH SENSITIVITY - Normal    Troponin T, High Sensitivity 8     D DIMER QUANTITATIVE - Normal    D-Dimer Quantitative 0.39     TROPONIN T, HIGH SENSITIVITY - Normal    Troponin T, High Sensitivity 8        Emergency Department Course:     Reviewed:  I reviewed nursing notes, vitals, past medical history and Care Everywhere    Assessments:  0920 I obtained history and examined the patient as noted above.   1109 I rechecked the patient and explained findings.     Disposition:  The patient was discharged to home.     Impression & Plan     Medical Decision Making:  Mago Santiago is a 67 year old female presented to the Emergency Department with a complaint of chest pain. Fortunately the workup in the ED has been unremarkable and at this time I am not concerned for ACS. The EKG shows sinus bradycardia without  signs of acute ischemia or injury.  Serial troponins are negative.  She has low risk and has had chest pain for multiple days without signs today of worrisome pathology.  She also frequently exercises and does not have symptoms with exercise arguing against angina.    I considered other possible causes of chest pain including PE (negative d-dimer), infection, pneumothorax, aortic dissection, and even more benign causes such as reflux and esophageal motility issues. The physical exam, laboratory, and radiological findings listed above make life threatening conditions less likely. At this time I believe the patient is safe for discharge. I have encouraged close outpatient follow up.  The patient understands that the cause of her symptoms is not clear and she should return should symptoms worsen.  She should also follow-up with her primary doctor within 3 days for reassessment.  All questions were answered prior to discharge.      Diagnosis:    ICD-10-CM    1. Nonspecific chest pain  R07.9    2. Upper back pain on left side  M54.9      Scribe Disclosure:  I, Flaco Orellana, am serving as a scribe at 9:20 AM on 9/6/2022 to document services personally performed by Abby Wyatt MD based on my observations and the provider's statements to me.        Abby Wyatt MD  09/06/22 3578

## 2022-09-06 NOTE — ED TRIAGE NOTES
A&O x4.  ABC's intact.      Pt arrives with c/o pain across shoulders for months, intermittent chest tightness x 1 wk, and yesterday having pain down left side of neck into shoulder & arm.

## 2022-09-08 ENCOUNTER — TRANSFERRED RECORDS (OUTPATIENT)
Dept: HEALTH INFORMATION MANAGEMENT | Facility: CLINIC | Age: 67
End: 2022-09-08

## 2022-11-01 ENCOUNTER — E-VISIT (OUTPATIENT)
Dept: URGENT CARE | Facility: URGENT CARE | Age: 67
End: 2022-11-01
Payer: COMMERCIAL

## 2022-11-01 DIAGNOSIS — N39.0 ACUTE UTI (URINARY TRACT INFECTION): Primary | ICD-10-CM

## 2022-11-01 PROCEDURE — 99421 OL DIG E/M SVC 5-10 MIN: CPT | Performed by: PHYSICIAN ASSISTANT

## 2022-11-01 RX ORDER — CEFDINIR 300 MG/1
300 CAPSULE ORAL 2 TIMES DAILY
Qty: 14 CAPSULE | Refills: 0 | Status: SHIPPED | OUTPATIENT
Start: 2022-11-01 | End: 2022-11-08

## 2022-11-02 NOTE — PATIENT INSTRUCTIONS
Dear Mago Santiago    After reviewing your responses, I've been able to diagnose you with a urinary tract infection, which is a common infection of the bladder with bacteria.  This is not a sexually transmitted infection, though urinating immediately after intercourse can help prevent infections.  Drinking lots of fluids is also helpful to clear your current infection and prevent the next one.      I have sent a prescription for antibiotics to your pharmacy to treat this infection.    It is important that you take all of your prescribed medication even if your symptoms are improving after a few doses.  Taking all of your medicine helps prevent the symptoms from returning.     If your symptoms worsen, you develop pain in your back or stomach, develop fevers, or are not improving in 5 days, please contact your primary care provider for an appointment or visit any of our convenient Walk-in or Urgent Care Centers to be seen, which can be found on our website here.    Thanks again for choosing us as your health care partner,    Ford Blackburn, [facility], PA-C    Urinary Tract Infections in Women  Urinary tract infections (UTIs) are most often caused by bacteria. These bacteria enter the urinary tract. The bacteria may come from inside the body. Or they may travel from the skin outside the rectum or vagina into the urethra. Female anatomy makes it easy for bacteria from the bowel to enter a woman s urinary tract, which is the most common source of UTI. This means women develop UTIs more often than men. Pain in or around the urinary tract is a common UTI symptom. But the only way to know for sure if you have a UTI for the healthcare provider to test your urine. The two tests that may be done are the urinalysis and urine culture.     Types of UTIs    Cystitis. A bladder infection (cystitis) is the most common UTI in women. You may have urgent or frequent need to pee. You may also have pain, burning when you pee, and bloody  urine.    Urethritis. This is an inflamed urethra, which is the tube that carries urine from the bladder to outside the body. You may have lower stomach or back pain. You may also have urgent or frequent need to pee.    Pyelonephritis. This is a kidney infection. If not treated, it can be serious and damage your kidneys. In severe cases, you may need to stay in the hospital. You may have a fever and lower back pain.    Medicines to treat a UTI  Most UTIs are treated with antibiotics. These kill the bacteria. The length of time you need to take them depends on the type of infection. It may be as short as 3 days. If you have repeated UTIs, you may need a low-dose antibiotic for several months. Take antibiotics exactly as directed. Don t stop taking them until all of the medicine is gone. If you stop taking the antibiotic too soon, the infection may not go away. You may also develop a resistance to the antibiotic. This can make it much harder to treat.   Lifestyle changes to treat and prevent UTIs   The lifestyle changes below will help get rid of your UTI. They may also help prevent future UTIs.     Drink plenty of fluids. This includes water, juice, or other caffeine-free drinks. Fluids help flush bacteria out of your body.    Empty your bladder. Always empty your bladder when you feel the urge to pee. And always pee before going to sleep. Urine that stays in your bladder can lead to infection. Try to pee before and after sex as well.    Practice good personal hygiene. Wipe yourself from front to back after using the toilet. This helps keep bacteria from getting into the urethra.    Use condoms during sex. These help prevent UTIs caused by sexually transmitted bacteria. Also don't use spermicides during sex. These can increase the risk for UTIs. Choose other forms of birth control instead. For women who tend to get UTIs after sex, a low-dose of a preventive antibiotic may be used. Be sure to discuss this option with  your healthcare provider.    Follow up with your healthcare provider as directed. He or she may test to make sure the infection has cleared. If needed, more treatment may be started.  Alexandru last reviewed this educational content on 7/1/2019 2000-2021 [employer]. All rights reserved. This information is not intended as a substitute for professional medical care. Always follow your healthcare professional's instructions.

## 2023-01-10 ENCOUNTER — ONCOLOGY VISIT (OUTPATIENT)
Dept: ONCOLOGY | Facility: CLINIC | Age: 68
End: 2023-01-10
Attending: INTERNAL MEDICINE
Payer: COMMERCIAL

## 2023-01-10 VITALS
DIASTOLIC BLOOD PRESSURE: 69 MMHG | RESPIRATION RATE: 16 BRPM | HEART RATE: 54 BPM | BODY MASS INDEX: 22.93 KG/M2 | TEMPERATURE: 97 F | WEIGHT: 150.8 LBS | OXYGEN SATURATION: 97 % | SYSTOLIC BLOOD PRESSURE: 106 MMHG

## 2023-01-10 DIAGNOSIS — M85.88 OSTEOPENIA OF LUMBAR SPINE: ICD-10-CM

## 2023-01-10 DIAGNOSIS — Z12.31 ENCOUNTER FOR SCREENING MAMMOGRAM FOR MALIGNANT NEOPLASM OF BREAST: ICD-10-CM

## 2023-01-10 DIAGNOSIS — C50.112 MALIGNANT NEOPLASM OF CENTRAL PORTION OF LEFT FEMALE BREAST, UNSPECIFIED ESTROGEN RECEPTOR STATUS (H): Primary | ICD-10-CM

## 2023-01-10 PROCEDURE — 99213 OFFICE O/P EST LOW 20 MIN: CPT | Performed by: INTERNAL MEDICINE

## 2023-01-10 PROCEDURE — G0463 HOSPITAL OUTPT CLINIC VISIT: HCPCS | Performed by: INTERNAL MEDICINE

## 2023-01-10 ASSESSMENT — PAIN SCALES - GENERAL: PAINLEVEL: NO PAIN (0)

## 2023-01-10 NOTE — PROGRESS NOTES
Visit Date: 01/10/2023    Mago Santiago is here today for interim followup.    CHIEF COMPLAINT:  History of left-sided breast cancer diagnosed in 2011, now approaching 11 years out from her diagnosis.    HISTORY OF PRESENT ILLNESS:  Mago is a 67-year-old patient who is postmenopausal.  She is about 11 years out from her diagnosis of left-sided breast cancer, which was estrogen receptor positive, progesterone receptor weakly positive, HER2 receptor negative.  She continues now on yearly observation.  She did do a mammogram in 04/2022, I have reviewed that with her today and that was negative.  She also did a DEXA scan.  She had some lowering of her bone density in the lumbar spine.  She is an avid exerciser and I have encouraged her to continue that, as well as some strength training and calcium and vitamin D for bone health.  She is having no major problems today.  She is feeling well.  She denies any respiratory, cardiovascular, genitourinary, musculoskeletal or neurological symptoms that are worrisome.    REVIEW OF SYSTEMS:  A 10-point comprehensive review of systems is otherwise unremarkable.    PAIN ASSESSMENT:  She is in no pain today.    MEDICATIONS AND ALLERGIES:  Outlined in the nursing records.    SOCIAL HISTORY:  She is retired from [employer].  She lives alone.  She is a nonsmoker and is an avid exerciser.    PHYSICAL EXAMINATION:    GENERAL:  She is a well-appearing lady in no acute distress.  VITAL SIGNS:  Stable.  NECK:  Has no masses or goiter.  CHEST:  Clear to auscultation and percussion bilaterally.  HEART:  Sounds 1 2, normal, no added sounds or murmurs.  BREASTS:  The patient is status post left-sided lumpectomy.  The scar looks good.  No further palpable masses.  Right breast normal, no palpable masses.  Right and left axillae negative.  GASTROINTESTINAL:  Abdomen is soft and nontender.  No hepatosplenomegaly.  EXTREMITIES:  Legs without tenderness or edema.  PSYCHIATRIC:  Normal.    DATA  REVIEW:  I have reviewed her last DEXA scan, as well as her last mammogram.    ASSESSMENT AND PLAN:  A 67-year-old postmenopausal patient with a history of left-sided breast cancer, now 11 years out from her diagnosis.  She has done very well from a breast cancer standpoint.  I have encouraged her to continue exercising, which will be good for her bone health.  We will see her again next year with a mammogram and bone density.  All of the above has been discussed.    Joyce Barillas MD        D: 01/10/2023   T: 01/10/2023   MT: MAGNO    Name:     DONATO FLORES  MRN:      [id number]        Account:    [id number]   :      1955           Visit Date: 01/10/2023     Document: [id number]

## 2023-01-10 NOTE — LETTER
1/10/2023         RE: Mago Santiago  [address] [place] [address]        Dear Colleague,    Thank you for referring your patient, Mago Santiago, to the [facility]. Please see a copy of my visit note below.    Visit Date: 01/10/2023    Mago Santiago is here today for interim followup.    CHIEF COMPLAINT:  History of left-sided breast cancer diagnosed in 2011, now approaching 11 years out from her diagnosis.    HISTORY OF PRESENT ILLNESS:  Mago is a 67-year-old patient who is postmenopausal.  She is about 11 years out from her diagnosis of left-sided breast cancer, which was estrogen receptor positive, progesterone receptor weakly positive, HER2 receptor negative.  She continues now on yearly observation.  She did do a mammogram in 04/2022, I have reviewed that with her today and that was negative.  She also did a DEXA scan.  She had some lowering of her bone density in the lumbar spine.  She is an avid exerciser and I have encouraged her to continue that, as well as some strength training and calcium and vitamin D for bone health.  She is having no major problems today.  She is feeling well.  She denies any respiratory, cardiovascular, genitourinary, musculoskeletal or neurological symptoms that are worrisome.    REVIEW OF SYSTEMS:  A 10-point comprehensive review of systems is otherwise unremarkable.    PAIN ASSESSMENT:  She is in no pain today.    MEDICATIONS AND ALLERGIES:  Outlined in the nursing records.    SOCIAL HISTORY:  She is retired from teaching.  She lives alone.  She is a nonsmoker and is an avid exerciser.    PHYSICAL EXAMINATION:    GENERAL:  She is a well-appearing lady in no acute distress.  VITAL SIGNS:  Stable.  NECK:  Has no masses or goiter.  CHEST:  Clear to auscultation and percussion bilaterally.  HEART:  Sounds 1 2, normal, no added sounds or murmurs.  BREASTS:  The patient is status post left-sided lumpectomy.  The scar looks good.  No  further palpable masses.  Right breast normal, no palpable masses.  Right and left axillae negative.  GASTROINTESTINAL:  Abdomen is soft and nontender.  No hepatosplenomegaly.  EXTREMITIES:  Legs without tenderness or edema.  PSYCHIATRIC:  Normal.    DATA REVIEW:  I have reviewed her last DEXA scan, as well as her last mammogram.    ASSESSMENT AND PLAN:  A 67-year-old postmenopausal patient with a history of left-sided breast cancer, now 11 years out from her diagnosis.  She has done very well from a breast cancer standpoint.  I have encouraged her to continue exercising, which will be good for her bone health.  We will see her again next year with a mammogram and bone density.  All of the above has been discussed.    Joyce Barillas MD        D: 01/10/2023   T: 01/10/2023   MT: MAGNO    Name:     DONATO FLORES  MRN:      [id number]        Account:    [id number]   :      1955           Visit Date: 01/10/2023     Document: [id number]      Again, thank you for allowing me to participate in the care of your patient.        Sincerely,        Joyce Barillas MD

## 2023-01-10 NOTE — NURSING NOTE
"Oncology Rooming Note    January 10, 2023 8:09 AM   Mago Santiago is a 67 year old female who presents for:    Chief Complaint   Patient presents with     Oncology Clinic Visit     Malignant neoplasm of central portion of left female breast     Initial Vitals: /69   Pulse 54   Temp 97  F (36.1  C) (Tympanic)   Resp 16   Wt 68.4 kg (150 lb 12.8 oz)   SpO2 97%   BMI 22.93 kg/m   Estimated body mass index is 22.93 kg/m  as calculated from the following:    Height as of 9/6/22: 1.727 m (5' 8\").    Weight as of this encounter: 68.4 kg (150 lb 12.8 oz). Body surface area is 1.81 meters squared.  No Pain (0) Comment: Data Unavailable   No LMP recorded. Patient is postmenopausal.  Allergies reviewed: Yes  Medications reviewed: Yes    Medications: Medication refills not needed today.  Pharmacy name entered into [employer]:    [id number] - [place], [place] - [address] AT [id number] - [place], [place] - [address]  AT [facility]    Tosha Patton, MAHOGANY              "

## 2023-03-23 ENCOUNTER — TRANSFERRED RECORDS (OUTPATIENT)
Dept: HEALTH INFORMATION MANAGEMENT | Facility: CLINIC | Age: 68
End: 2023-03-23

## 2023-05-15 ENCOUNTER — ANCILLARY PROCEDURE (OUTPATIENT)
Dept: MAMMOGRAPHY | Facility: CLINIC | Age: 68
End: 2023-05-15
Payer: COMMERCIAL

## 2023-05-15 DIAGNOSIS — Z12.31 ENCOUNTER FOR SCREENING MAMMOGRAM FOR MALIGNANT NEOPLASM OF BREAST: ICD-10-CM

## 2023-05-15 DIAGNOSIS — C50.112 MALIGNANT NEOPLASM OF CENTRAL PORTION OF LEFT FEMALE BREAST, UNSPECIFIED ESTROGEN RECEPTOR STATUS (H): ICD-10-CM

## 2023-05-15 PROCEDURE — 77067 SCR MAMMO BI INCL CAD: CPT | Mod: TC | Performed by: RADIOLOGY

## 2023-05-15 PROCEDURE — 77063 BREAST TOMOSYNTHESIS BI: CPT | Mod: TC | Performed by: RADIOLOGY

## 2023-05-31 NOTE — TELEPHONE ENCOUNTER
Central Prior Authorization Team   Phone: [phone]      PA Initiation    Medication: typhoid (VIVOTIF) CR capsule  Insurance Company: [employer] [place] - Phone [phone] Fax [phone]  Pharmacy Filling the Rx: [facility] [address] [place] [address]  AT [facility] & [facility]  Filling Pharmacy Phone: [phone]  Filling Pharmacy Fax: [phone]  Start Date: 10/15/2018      
Prior Authorization Approval    Authorization Effective Date: 10/14/2018  Authorization Expiration Date: 10/14/2019  Medication: typhoid (VIVOTIF) CR capsule  Approved Dose/Quantity:    Reference #:     Insurance Company: [employer] [place] - Phone [phone] Fax [phone]  Expected CoPay:       CoPay Card Available:      Foundation Assistance Needed:    Which Pharmacy is filling the prescription (Not needed for infusion/clinic administered): [facility] [address] [place] [address] [address] [facility]  AT [facility]  Pharmacy Notified: Yes  Patient Notified: Yes        
Prior Authorization Retail Medication Request    Medication/Dose: typhoid (VIVOTIF) CR capsule  ICD code (if different than what is on RX):  Travel advice encounter [Z71.89]   Previously Tried and Failed:  N/A  Rationale:  N/a     Insurance Name:  [facility]  Insurance ID:  [id number]      Pharmacy Information (if different than what is on RX)  Name:  [facility]   Phone:  [phone]    
moderate assist (50% patients effort)

## 2023-06-09 ENCOUNTER — TRANSFERRED RECORDS (OUTPATIENT)
Dept: HEALTH INFORMATION MANAGEMENT | Facility: CLINIC | Age: 68
End: 2023-06-09
Payer: COMMERCIAL

## 2023-06-15 ENCOUNTER — OFFICE VISIT (OUTPATIENT)
Dept: PEDIATRICS | Facility: CLINIC | Age: 68
End: 2023-06-15
Payer: COMMERCIAL

## 2023-06-15 ENCOUNTER — TELEPHONE (OUTPATIENT)
Dept: PEDIATRICS | Facility: CLINIC | Age: 68
End: 2023-06-15

## 2023-06-15 VITALS
WEIGHT: 158.7 LBS | RESPIRATION RATE: 16 BRPM | SYSTOLIC BLOOD PRESSURE: 120 MMHG | DIASTOLIC BLOOD PRESSURE: 74 MMHG | OXYGEN SATURATION: 100 % | HEART RATE: 76 BPM | BODY MASS INDEX: 24.13 KG/M2 | TEMPERATURE: 97.2 F

## 2023-06-15 DIAGNOSIS — R01.1 HEART MURMUR: ICD-10-CM

## 2023-06-15 DIAGNOSIS — E04.1 THYROID NODULE: Primary | ICD-10-CM

## 2023-06-15 DIAGNOSIS — N18.2 CKD (CHRONIC KIDNEY DISEASE) STAGE 2, GFR 60-89 ML/MIN: ICD-10-CM

## 2023-06-15 LAB
ANION GAP SERPL CALCULATED.3IONS-SCNC: 12 MMOL/L (ref 7–15)
BUN SERPL-MCNC: 10.4 MG/DL (ref 8–23)
CALCIUM SERPL-MCNC: 10 MG/DL (ref 8.8–10.2)
CHLORIDE SERPL-SCNC: 104 MMOL/L (ref 98–107)
CREAT SERPL-MCNC: 0.93 MG/DL (ref 0.51–0.95)
DEPRECATED HCO3 PLAS-SCNC: 24 MMOL/L (ref 22–29)
GFR SERPL CREATININE-BSD FRML MDRD: 67 ML/MIN/1.73M2
GLUCOSE SERPL-MCNC: 101 MG/DL (ref 70–99)
POTASSIUM SERPL-SCNC: 4.3 MMOL/L (ref 3.4–5.3)
SODIUM SERPL-SCNC: 140 MMOL/L (ref 136–145)
T4 FREE SERPL-MCNC: 1.12 NG/DL (ref 0.9–1.7)
TSH SERPL DL<=0.005 MIU/L-ACNC: 1.99 UIU/ML (ref 0.3–4.2)

## 2023-06-15 PROCEDURE — 80048 BASIC METABOLIC PNL TOTAL CA: CPT

## 2023-06-15 PROCEDURE — 84443 ASSAY THYROID STIM HORMONE: CPT

## 2023-06-15 PROCEDURE — 99214 OFFICE O/P EST MOD 30 MIN: CPT | Mod: GC

## 2023-06-15 PROCEDURE — 36415 COLL VENOUS BLD VENIPUNCTURE: CPT

## 2023-06-15 PROCEDURE — 84439 ASSAY OF FREE THYROXINE: CPT

## 2023-06-15 ASSESSMENT — PAIN SCALES - GENERAL: PAINLEVEL: NO PAIN (0)

## 2023-06-15 NOTE — TELEPHONE ENCOUNTER
Patient scheduled with resident physician for MRI follow up. No recent MRI in [facility].    Adore Holt MD  Internal Medicine & Pediatrics  [facility]  She/her

## 2023-06-15 NOTE — PROGRESS NOTES
Assessment & Plan     (E04.1) Thyroid nodules, multiple and bilateral  (primary encounter diagnosis)  Comment: Noted incidentally on recent MRI for her shoulder pain. No significant symptoms other than some scratchy and tight throat for the past few months, no weight loss, no systemic symptoms of malignancy  Plan: US Biopsy Thyroid Fine Needle Aspiration, US         Thyroid, TSH, T4, free, Basic metabolic panel          (Ca, Cl, CO2, Creat, Gluc, K, Na, BUN)        - follow up pending results of labs    (N18.2) CKD (chronic kidney disease) stage 2, GFR 60-89 ml/min  Comment: Patient has been stable  Plan: continue to monitor    (R01.1) Heart murmur  Comment: noted today on exam, 3/6 systolic murmur heard best over right sternal border concerning for aortic stenosis, given radiation history she has increased risk.  Plan: Echocardiogram Complete                         Ken Montoya MD, Phd  [facility] Mercedes is a 67 year old, presenting for the following health issues:  Results        6/15/2023     2:47 PM   Additional Questions   Roomed by Essie Rogers     History of Present Illness       Reason for visit:  Thyroid nodules  Symptom onset:  3-4 weeks ago  Symptoms include:  Trouble swallowing  Symptom intensity:  Mild  Symptom progression:  Staying the same  Had these symptoms before:  No  What makes it worse:  No  What makes it better:  No    She eats 2-3 servings of fruits and vegetables daily.She consumes 1 sweetened beverage(s) daily.She exercises with enough effort to increase her heart rate 30 to 60 minutes per day.  She exercises with enough effort to increase her heart rate 7 days per week.   She is taking medications regularly.     Mago Santiago is a 67 year old female with PMH of left sided breast cancer (ER +, [place] w+, HER2 neg), peripheral neuropathy, celiac disease and CKD2 who presents for follow up after MRI which revealed multiple thryoid nodules.  Plastic Surgery Progress Note    Subjective:  Postoperative day #4.  The patient is awake and alert in his hospital bed this morning.  I received a call yesterday evening that 1 of the central maxillomandibular fixation wires was loose.  The patient's bedside RN this morning confirms that he has been \"fiddling\" with the wires once again.  He has also been removing the bone wax over the wire edges.  The patient's mother was at bedside this morning.    Objective:      Vitals  Vitals:    02/10/23 0729 02/10/23 1702 02/10/23 2301 02/11/23 0358   Temp: 97.5 °F (36.4 °C) 98.8 °F (37.1 °C)  97.5 °F (36.4 °C)   Pulse: 81 78 84 81   Resp: 18 20 18 16   SpO2: 92% 98% 98% 100%   BP: 129/74 (!) 148/92 (!) 143/82 (!) 147/86    02/11/23 0744   Temp: 98.4 °F (36.9 °C)   Pulse: 91   Resp: 20   SpO2: 100%   BP: (!) 148/95     I/O's    Intake/Output Summary (Last 24 hours) at 2/11/2023 0922  Last data filed at 2/11/2023 0345  Gross per 24 hour   Intake 360 ml   Output 3700 ml   Net -3340 ml     I/O last 3 completed shifts:  In: 600 [P.O.:600]  Out: 3700 [Urine:3700]  No intake/output data recorded.    Physical Exam:  Maxillomandibular fixation was inspected.  The dental occlusion is good.  1 of the wires had been loosened by the patient yesterday.  This wire was removed with a heavy needle  and a .  It was replaced with a rubber band. Additional bone wax was placed over all exposed wire edges.    Labs   Recent Labs   Lab 02/11/23  0611 02/10/23  0802 02/09/23  0623 02/08/23  0517 02/07/23  0750 02/06/23  0322 02/05/23  0906 02/05/23  0429 02/04/23  1802   WBC 14.0* 13.2* 13.6* 16.8* 20.1* 18.5*  --  23.0* 26.8*   HGB 8.8* 8.3* 8.6* 8.6* 9.1* 9.9*   < > 11.1* 13.5   HCT 27.2* 25.2* 26.8* 26.7* 28.4* 30.6*   < > 34.2* 41.3   MCV 92.2 92.3 94.0 93.7 92.5 93.9  --  93.2 92.6    355 417 361 359 389  --  425 577*   SODIUM 140  --   --  140 140 139  --  138 140   POTASSIUM 3.9  3.8  --  3.7 3.8 4.1 4.3  --  4.6 3.8    CHLORIDE 106  --   --  104 104 106  --  105 105   CO2 28  --   --  31 29 29  --  24 29   BUN 9  --   --  15 16 14  --  13 12   CREATININE 0.57*  --   --  0.62* 0.47* 0.74  --  0.79 0.88   GLUCOSE 96  --   --  98 137* 133*  --  164* 158*   CALCIUM 8.4  --   --  8.3* 8.6 8.3*  --  8.3* 9.3   AST  --   --   --   --   --  74*  --  91* 110*   GPT  --   --   --   --   --  45  --  63 82*    < > = values in this interval not displayed.     Assessment/Plan:  Status post placement of maxillomandibular fixation for right mandibular body and left mandibular neck fracture.  The patient is doing well from my perspective.     1.  Continue wired jaw blenderized diet.  2.  The patient's mother believes that he should be able to \"swish and spit\" normally.  Therefore, we will add Peridex mouth rinse to the patient's oral care regimen.  3.  Maxillomandibular fixation need to stay in place for 4 to 6 weeks.  More than likely, we will remove the maxillomandibular fixation the week of March 6, 2023.  4.  Okay for hospital discharge to acute inpatient rehabilitation from trauma facial perspective.      Todd Edouard MD     She had gotten an MRI due to chronic shoulder pain that wasn't getting better with PT, MRI revealed some mild disc bulge on C5 and C6 on the left side but also multiple thyroid nodules, largest on left being 2 cm and largest on right 2.5 cm. She has not had any significant symptoms other than some scratchy throat and tightness for the past multiple months. No weight loss, night sweats, heat intolerance, chest pain, palpitations, diarrhea, constipation, abdominal pain, joint pain, rashes or other symptoms. She has had some weakness and numbness over her left shoulder but this has been chronic. She takes no medications other than occasional omeprazole for GERD. She felt like her throat might be related to GERD. She has never passed out, she doesn't have any respiratory symptoms.    In terms of cancer she was diagnosed 11 years ago, got a lumpectomy, chemo and then radiation, lymph nodes were checked, and she has been cancer free since.     She has no prior history of murmur.    No FH of thyroid disease, no cancer, no other cancer      Review of Systems   Negative unless noted above      Objective    /74   Pulse 76   Temp 97.2  F (36.2  C) (Tympanic)   Resp 16   Wt 72 kg (158 lb 11.2 oz)   SpO2 100%   BMI 24.13 kg/m    Body mass index is 24.13 kg/m .  Physical Exam   GENERAL: healthy, alert and no distress  EYES: Eyes grossly normal to inspection, PERRL and conjunctivae and sclerae normal  HENT: ear canals and TM's normal, nose and mouth without ulcers or lesions  NECK: no adenopathy, no asymmetry. Possible nodule felt on right side, no nodules palpated on left of thyroid, no obvious deformity  RESP: lungs clear to auscultation - no rales, rhonchi or wheezes  CV: regular rate and rhythm, normal S1 and S2, grade 3/6 systolic murmur heard best at the right sternal border, no peripheral edema  ABDOMEN: soft, nontender, no hepatosplenomegaly, no masses and bowel sounds normal  MS: no gross musculoskeletal  defects noted, no edema  SKIN: no suspicious lesions or rashes  NEURO: Normal strength and tone, mentation intact and speech normal  PSYCH: mentation appears normal, affect normal/bright

## 2023-06-15 NOTE — TELEPHONE ENCOUNTER
"Received call back from patient. Patient states they had an MRI done through [facility] which showed \"multiple thyroid nodules, recommendation for US and possible FNA\". Patient does not have report. RN advised patient reach out to [facility] to have records faxed, advised [facility] fax number. Patient will attempt to contact [facility].     RN received message from precepting provider, ok to keep appointment per provider. RN called and LDVM informing patient to keep appointment.     Isauro MCCURDY RN 6/15/2023 at 2:33 PM    "

## 2023-06-15 NOTE — PATIENT INSTRUCTIONS
It was great meeting you today! As discussed you should follow up for imaging at [facility], you will have an ultrasound of your heart and your thyroid, we will make follow up plans after those results are back. Please do not hesitate to reach out with any questions or concerns.    Radiology scheduling phone number: [phone]

## 2023-06-16 ENCOUNTER — HOSPITAL ENCOUNTER (OUTPATIENT)
Dept: CARDIOLOGY | Facility: CLINIC | Age: 68
Discharge: HOME | End: 2023-06-16
Attending: STUDENT IN AN ORGANIZED HEALTH CARE EDUCATION/TRAINING PROGRAM | Admitting: STUDENT IN AN ORGANIZED HEALTH CARE EDUCATION/TRAINING PROGRAM
Payer: COMMERCIAL

## 2023-06-16 DIAGNOSIS — R01.1 HEART MURMUR: ICD-10-CM

## 2023-06-16 LAB — LVEF ECHO: NORMAL

## 2023-06-16 PROCEDURE — 93306 TTE W/DOPPLER COMPLETE: CPT | Mod: 26 | Performed by: INTERNAL MEDICINE

## 2023-06-16 PROCEDURE — 93306 TTE W/DOPPLER COMPLETE: CPT

## 2023-06-21 ENCOUNTER — HOSPITAL ENCOUNTER (OUTPATIENT)
Dept: ULTRASOUND IMAGING | Facility: CLINIC | Age: 68
Discharge: HOME | End: 2023-06-21
Attending: STUDENT IN AN ORGANIZED HEALTH CARE EDUCATION/TRAINING PROGRAM | Admitting: STUDENT IN AN ORGANIZED HEALTH CARE EDUCATION/TRAINING PROGRAM
Payer: COMMERCIAL

## 2023-06-21 DIAGNOSIS — E04.1 THYROID NODULE: ICD-10-CM

## 2023-06-21 PROCEDURE — 76536 US EXAM OF HEAD AND NECK: CPT

## 2023-06-22 ENCOUNTER — TRANSFERRED RECORDS (OUTPATIENT)
Dept: HEALTH INFORMATION MANAGEMENT | Facility: CLINIC | Age: 68
End: 2023-06-22
Payer: COMMERCIAL

## 2023-06-22 DIAGNOSIS — E04.1 THYROID NODULE: Primary | ICD-10-CM

## 2023-07-03 ENCOUNTER — HOSPITAL ENCOUNTER (OUTPATIENT)
Dept: ULTRASOUND IMAGING | Facility: CLINIC | Age: 68
Discharge: HOME | End: 2023-07-03
Attending: STUDENT IN AN ORGANIZED HEALTH CARE EDUCATION/TRAINING PROGRAM | Admitting: STUDENT IN AN ORGANIZED HEALTH CARE EDUCATION/TRAINING PROGRAM
Payer: COMMERCIAL

## 2023-07-03 DIAGNOSIS — E04.1 THYROID NODULE: ICD-10-CM

## 2023-07-03 PROCEDURE — 10006 FNA BX W/US GDN EA ADDL: CPT

## 2023-07-03 PROCEDURE — 250N000009 HC RX 250: Performed by: RADIOLOGY

## 2023-07-03 PROCEDURE — 88173 CYTOPATH EVAL FNA REPORT: CPT | Mod: TC | Performed by: STUDENT IN AN ORGANIZED HEALTH CARE EDUCATION/TRAINING PROGRAM

## 2023-07-03 RX ORDER — LIDOCAINE HYDROCHLORIDE 10 MG/ML
10 INJECTION, SOLUTION EPIDURAL; INFILTRATION; INTRACAUDAL; PERINEURAL ONCE
Status: COMPLETED | OUTPATIENT
Start: 2023-07-03 | End: 2023-07-03

## 2023-07-03 RX ADMIN — LIDOCAINE HYDROCHLORIDE 10 ML: 10 INJECTION, SOLUTION EPIDURAL; INFILTRATION; INTRACAUDAL; PERINEURAL at 13:14

## 2023-07-03 NOTE — PROGRESS NOTES
Pt arrived to Radiology today for an US guided bilateral  thyroid FNA/biopsy. Procedure was performed by Dr. Kaminski. Procedure went without complications and pt tolerated procedure well. Pt was given written and verbal instructions and left in satisfactory condition.  Specimen to lab for testing.

## 2023-07-06 LAB
PATH REPORT.COMMENTS IMP SPEC: NORMAL
PATH REPORT.COMMENTS IMP SPEC: NORMAL
PATH REPORT.FINAL DX SPEC: NORMAL
PATH REPORT.GROSS SPEC: NORMAL
PATH REPORT.MICROSCOPIC SPEC OTHER STN: NORMAL

## 2023-07-06 PROCEDURE — 88173 CYTOPATH EVAL FNA REPORT: CPT | Mod: 26 | Performed by: PATHOLOGY

## 2023-07-27 SDOH — ECONOMIC STABILITY: FOOD INSECURITY: WITHIN THE PAST 12 MONTHS, YOU WORRIED THAT YOUR FOOD WOULD RUN OUT BEFORE YOU GOT MONEY TO BUY MORE.: NEVER TRUE

## 2023-07-27 SDOH — ECONOMIC STABILITY: INCOME INSECURITY: HOW HARD IS IT FOR YOU TO PAY FOR THE VERY BASICS LIKE FOOD, HOUSING, MEDICAL CARE, AND HEATING?: NOT HARD AT ALL

## 2023-07-27 SDOH — HEALTH STABILITY: PHYSICAL HEALTH: ON AVERAGE, HOW MANY MINUTES DO YOU ENGAGE IN EXERCISE AT THIS LEVEL?: 70 MIN

## 2023-07-27 SDOH — ECONOMIC STABILITY: FOOD INSECURITY: WITHIN THE PAST 12 MONTHS, THE FOOD YOU BOUGHT JUST DIDN'T LAST AND YOU DIDN'T HAVE MONEY TO GET MORE.: NEVER TRUE

## 2023-07-27 SDOH — HEALTH STABILITY: PHYSICAL HEALTH: ON AVERAGE, HOW MANY DAYS PER WEEK DO YOU ENGAGE IN MODERATE TO STRENUOUS EXERCISE (LIKE A BRISK WALK)?: 7 DAYS

## 2023-07-27 SDOH — ECONOMIC STABILITY: INCOME INSECURITY: IN THE LAST 12 MONTHS, WAS THERE A TIME WHEN YOU WERE NOT ABLE TO PAY THE MORTGAGE OR RENT ON TIME?: NO

## 2023-07-27 ASSESSMENT — ENCOUNTER SYMPTOMS
JOINT SWELLING: 0
PALPITATIONS: 0
FREQUENCY: 0
DYSURIA: 0
DIARRHEA: 0
ABDOMINAL PAIN: 0
HEARTBURN: 0
HEMATURIA: 0
CHILLS: 0
NAUSEA: 0
MYALGIAS: 0
COUGH: 0
BREAST MASS: 0
SORE THROAT: 0
CONSTIPATION: 0
DIZZINESS: 0
HEADACHES: 0
SHORTNESS OF BREATH: 0
EYE PAIN: 0
NERVOUS/ANXIOUS: 0
FEVER: 0
ARTHRALGIAS: 1
WEAKNESS: 0
PARESTHESIAS: 0
HEMATOCHEZIA: 0

## 2023-07-27 ASSESSMENT — LIFESTYLE VARIABLES
HOW OFTEN DO YOU HAVE A DRINK CONTAINING ALCOHOL: 2-4 TIMES A MONTH
HOW MANY STANDARD DRINKS CONTAINING ALCOHOL DO YOU HAVE ON A TYPICAL DAY: 1 OR 2
AUDIT-C TOTAL SCORE: 2
HOW OFTEN DO YOU HAVE SIX OR MORE DRINKS ON ONE OCCASION: NEVER
SKIP TO QUESTIONS 9-10: 1

## 2023-07-27 ASSESSMENT — SOCIAL DETERMINANTS OF HEALTH (SDOH)
HOW OFTEN DO YOU ATTEND CHURCH OR RELIGIOUS SERVICES?: NEVER
HOW OFTEN DO YOU GET TOGETHER WITH FRIENDS OR RELATIVES?: ONCE A WEEK
DO YOU BELONG TO ANY CLUBS OR ORGANIZATIONS SUCH AS CHURCH GROUPS UNIONS, FRATERNAL OR ATHLETIC GROUPS, OR SCHOOL GROUPS?: YES
IN A TYPICAL WEEK, HOW MANY TIMES DO YOU TALK ON THE PHONE WITH FAMILY, FRIENDS, OR NEIGHBORS?: THREE TIMES A WEEK

## 2023-07-27 ASSESSMENT — ACTIVITIES OF DAILY LIVING (ADL): CURRENT_FUNCTION: NO ASSISTANCE NEEDED

## 2023-07-31 ENCOUNTER — OFFICE VISIT (OUTPATIENT)
Dept: ORTHOPEDICS | Facility: CLINIC | Age: 68
End: 2023-07-31
Payer: COMMERCIAL

## 2023-07-31 VITALS
SYSTOLIC BLOOD PRESSURE: 104 MMHG | BODY MASS INDEX: 23.49 KG/M2 | HEIGHT: 68 IN | WEIGHT: 155 LBS | DIASTOLIC BLOOD PRESSURE: 66 MMHG

## 2023-07-31 DIAGNOSIS — M17.12 PRIMARY OSTEOARTHRITIS OF LEFT KNEE: Primary | ICD-10-CM

## 2023-07-31 DIAGNOSIS — M17.11 PRIMARY OSTEOARTHRITIS OF RIGHT KNEE: ICD-10-CM

## 2023-07-31 PROCEDURE — 20611 DRAIN/INJ JOINT/BURSA W/US: CPT | Mod: 50 | Performed by: FAMILY MEDICINE

## 2023-07-31 RX ORDER — ROPIVACAINE HYDROCHLORIDE 5 MG/ML
4 INJECTION, SOLUTION EPIDURAL; INFILTRATION; PERINEURAL
Status: SHIPPED | OUTPATIENT
Start: 2023-07-31

## 2023-07-31 RX ORDER — METHYLPREDNISOLONE ACETATE 40 MG/ML
40 INJECTION, SUSPENSION INTRA-ARTICULAR; INTRALESIONAL; INTRAMUSCULAR; SOFT TISSUE
Status: SHIPPED | OUTPATIENT
Start: 2023-07-31

## 2023-07-31 RX ADMIN — ROPIVACAINE HYDROCHLORIDE 4 ML: 5 INJECTION, SOLUTION EPIDURAL; INFILTRATION; PERINEURAL at 08:51

## 2023-07-31 RX ADMIN — METHYLPREDNISOLONE ACETATE 40 MG: 40 INJECTION, SUSPENSION INTRA-ARTICULAR; INTRALESIONAL; INTRAMUSCULAR; SOFT TISSUE at 08:51

## 2023-07-31 NOTE — PATIENT INSTRUCTIONS
1. Primary osteoarthritis of left knee    2. Primary osteoarthritis of right knee      -Patient's following up for chronic bilateral knee pain due to arthritis  -Patient tolerated bilateral knee intra-articular cortisone injection today without complications.  Patient was given postprocedure instructions  -Patient reports receiving approximately 2 to 3 months of relief from her previous cortisone injections  -Authorization for Synvisc 1 was ordered today  -Patient will start home exercise program.  Handouts were given today  -Patient will follow-up in 6 to 8 weeks to administer Synvisc 1 medication.  -Call direct clinic number [[phone]] at any time with questions or concerns.    Albert Yeo MD CAM  [facility]  [facility] [place]

## 2023-07-31 NOTE — LETTER
"    7/31/2023         RE: Mago Santiago  [address] [place] [address]        Dear Colleague,    Thank you for referring your patient, Mago Santiago, to the [facility] [place]. Please see a copy of my visit note below.    ASSESSMENT & PLAN  There are no Patient Instructions on file for this visit.  -----    SUBJECTIVE:  Mago Santiago is a 67 year old female who is seen in follow-up for bilateral knee pain.They were last seen 4/18/22 and had US guided bilateral knee cortisone injections.     Patient reports great relief lasting ~ 2-3 months. Patient reports pain has returned gradually. She denies new injury or trauma. She reports achy pain. They indicate that their current pain level is 3/10. They have tried rest/activity avoidance, Tylenol, ibuprofen, and previous imaging (xray 9/8/21).      The patient is seen by themselves.    Patient's past medical, surgical, social, and family histories were reviewed today and no changes are noted.    REVIEW OF SYSTEMS:  Constitutional: NEGATIVE for fever, chills, change in weight  Skin: NEGATIVE for worrisome rashes, moles or lesions  GI/: NEGATIVE for bowel or bladder changes  Neuro: NEGATIVE for weakness, dizziness or paresthesias    OBJECTIVE:  /66   Ht 1.727 m (5' 8\")   Wt 70.3 kg (155 lb)   BMI 23.57 kg/m     General: healthy, alert and in no distress  HEENT: no scleral icterus or conjunctival erythema  Skin: no suspicious lesions or rash. No jaundice.  CV: regular rhythm by palpation, no pedal edema  Resp: normal respiratory effort without conversational dyspnea   Psych: normal mood and affect  Gait: normal steady gait with appropriate coordination and balance  Neuro: normal light touch sensory exam of the extremities.    MSK:  BILATERAL KNEE  Inspection:    Normal alignment; no edema, erythema, or ecchymosis present  Palpation:    Tender about the lateral patellar facet, medial patellar facet and medial joint " line. Remainder of bony and ligamentous landmarks are nontender.    Trace effusion is present    Patellofemoral crepitus is Absent  Range of Motion:     00 extension to 1200 flexion  Strength:    Quadriceps grossly intact    Extensor mechanism intact  Special Tests:    Positive: none    Negative: MCL/valgus stress (0 & 30 deg), LCL/varus stress (0 & 30 deg), Lachman's, anterior drawer, posterior drawer, Dacia's      Independent visualization of the below image:  KNEE BILATERAL THREE VIEWS   9/8/2021 8:30 AM      HISTORY: Bilateral knee pain.     COMPARISON: 11/12/2019 x-ray.                                                                   IMPRESSION: Moderate bilateral patellofemoral degenerative changes  most prominent medially. 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 No evidence of acute  fracture. Small right knee joint effusion. No left knee joint  effusion. Mild progression of patellofemoral degenerative change since  the comparison study.    Large Joint Injection/Arthocentesis: bilateral knee    Date/Time: 7/31/2023 8:51 AM    Performed by: Yeo, Albert, MD  Authorized by: Yeo, Albert, MD    Indications:  Pain and osteoarthritis  Needle Size:  22 G  Guidance: ultrasound    Approach:  Anterolateral  Location:  Knee  Laterality:  Bilateral      Medications (Right):  40 mg methylPREDNISolone 40 MG/ML; 4 mL ropivacaine 5 MG/ML  Medications (Left):  40 mg methylPREDNISolone 40 MG/ML; 4 mL ropivacaine 5 MG/ML  Outcome:  Tolerated well, no immediate complications  Procedure discussed: discussed risks, benefits, and alternatives    Consent Given by:  Patient  Timeout: timeout called immediately prior to procedure    Prep: patient was prepped and draped in usual sterile fashion     Ultrasound was used to ensure safe and accurate needle placement and injection. Ultrasound images of the procedure were permanently stored.            Albert Yeo MD, [facility]  [facility]        Again, thank you for allowing me to participate in the care of your patient.        Sincerely,        Albert Yeo, MD

## 2023-07-31 NOTE — PROGRESS NOTES
"ASSESSMENT & PLAN  Patient Instructions     1. Primary osteoarthritis of left knee    2. Primary osteoarthritis of right knee      -Patient's following up for chronic bilateral knee pain due to arthritis  -Patient tolerated bilateral knee intra-articular cortisone injection today without complications.  Patient was given postprocedure instructions  -Patient reports receiving approximately 2 to 3 months of relief from her previous cortisone injections  -Authorization for Synvisc 1 was ordered today  -Patient will start home exercise program.  Handouts were given today  -Patient will follow-up in 6 to 8 weeks to administer Synvisc 1 medication.  -Call direct clinic number [[phone]] at any time with questions or concerns.    Albert Yeo MD [facility]  [facility]        -----    SUBJECTIVE:  Mago Santiago is a 67 year old female who is seen in follow-up for bilateral knee pain.They were last seen 4/18/22 and had US guided bilateral knee cortisone injections.     Patient reports great relief lasting ~ 2-3 months. Patient reports pain has returned gradually. She denies new injury or trauma. She reports achy pain. They indicate that their current pain level is 3/10. They have tried rest/activity avoidance, Tylenol, ibuprofen, and previous imaging (xray 9/8/21).      The patient is seen by themselves.    Patient's past medical, surgical, social, and family histories were reviewed today and no changes are noted.    REVIEW OF SYSTEMS:  Constitutional: NEGATIVE for fever, chills, change in weight  Skin: NEGATIVE for worrisome rashes, moles or lesions  GI/: NEGATIVE for bowel or bladder changes  Neuro: NEGATIVE for weakness, dizziness or paresthesias    OBJECTIVE:  /66   Ht 1.727 m (5' 8\")   Wt 70.3 kg (155 lb)   BMI 23.57 kg/m     General: healthy, alert and in no distress  HEENT: no scleral icterus or conjunctival erythema  Skin: no suspicious lesions or " rash. No jaundice.  CV: regular rhythm by palpation, no pedal edema  Resp: normal respiratory effort without conversational dyspnea   Psych: normal mood and affect  Gait: normal steady gait with appropriate coordination and balance  Neuro: normal light touch sensory exam of the extremities.    MSK:  BILATERAL KNEE  Inspection:    Normal alignment; no edema, erythema, or ecchymosis present  Palpation:    Tender about the lateral patellar facet, medial patellar facet and medial joint line. Remainder of bony and ligamentous landmarks are nontender.    Trace effusion is present    Patellofemoral crepitus is Absent  Range of Motion:     00 extension to 1200 flexion  Strength:    Quadriceps grossly intact    Extensor mechanism intact  Special Tests:    Positive: none    Negative: MCL/valgus stress (0 & 30 deg), LCL/varus stress (0 & 30 deg), Lachman's, anterior drawer, posterior drawer, Dacia's      Independent visualization of the below image:  KNEE BILATERAL THREE VIEWS   9/8/2021 8:30 AM      HISTORY: Bilateral knee pain.     COMPARISON: 11/12/2019 x-ray.                                                                   IMPRESSION: Moderate bilateral patellofemoral degenerative changes  most prominent medially. 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 No evidence of acute  fracture. Small right knee joint effusion. No left knee joint  effusion. Mild progression of patellofemoral degenerative change since  the comparison study.    Large Joint Injection/Arthocentesis: bilateral knee    Date/Time: 7/31/2023 8:51 AM    Performed by: Yeo, Albert, MD  Authorized by: Yeo, Albert, MD    Indications:  Pain and osteoarthritis  Needle Size:  22 G  Guidance: ultrasound    Approach:  Anterolateral  Location:  Knee  Laterality:  Bilateral      Medications (Right):  40 mg methylPREDNISolone 40 MG/ML; 4 mL ropivacaine 5 MG/ML  Medications (Left):  40 mg methylPREDNISolone 40 MG/ML; 4  mL ropivacaine 5 MG/ML  Outcome:  Tolerated well, no immediate complications  Procedure discussed: discussed risks, benefits, and alternatives    Consent Given by:  Patient  Timeout: timeout called immediately prior to procedure    Prep: patient was prepped and draped in usual sterile fashion     Ultrasound was used to ensure safe and accurate needle placement and injection. Ultrasound images of the procedure were permanently stored.            Albert Yeo MD, [facility]

## 2023-08-03 ENCOUNTER — OFFICE VISIT (OUTPATIENT)
Dept: PEDIATRICS | Facility: CLINIC | Age: 68
End: 2023-08-03
Payer: COMMERCIAL

## 2023-08-03 VITALS
HEIGHT: 68 IN | BODY MASS INDEX: 23.49 KG/M2 | TEMPERATURE: 97.1 F | WEIGHT: 155 LBS | DIASTOLIC BLOOD PRESSURE: 60 MMHG | OXYGEN SATURATION: 99 % | RESPIRATION RATE: 14 BRPM | SYSTOLIC BLOOD PRESSURE: 110 MMHG | HEART RATE: 71 BPM

## 2023-08-03 DIAGNOSIS — R01.1 HEART MURMUR: ICD-10-CM

## 2023-08-03 DIAGNOSIS — Z00.00 ROUTINE GENERAL MEDICAL EXAMINATION AT A HEALTH CARE FACILITY: Primary | ICD-10-CM

## 2023-08-03 DIAGNOSIS — E04.1 THYROID NODULE: ICD-10-CM

## 2023-08-03 DIAGNOSIS — Z13.220 LIPID SCREENING: ICD-10-CM

## 2023-08-03 PROBLEM — N18.2 CKD (CHRONIC KIDNEY DISEASE) STAGE 2, GFR 60-89 ML/MIN: Status: RESOLVED | Noted: 2019-10-22 | Resolved: 2023-08-03

## 2023-08-03 LAB
CHOLEST SERPL-MCNC: 185 MG/DL
ERYTHROCYTE [DISTWIDTH] IN BLOOD BY AUTOMATED COUNT: 13 % (ref 10–15)
HCT VFR BLD AUTO: 36.8 % (ref 35–47)
HDLC SERPL-MCNC: 58 MG/DL
HGB BLD-MCNC: 11.7 G/DL (ref 11.7–15.7)
LDLC SERPL CALC-MCNC: 90 MG/DL
MCH RBC QN AUTO: 27.2 PG (ref 26.5–33)
MCHC RBC AUTO-ENTMCNC: 31.8 G/DL (ref 31.5–36.5)
MCV RBC AUTO: 86 FL (ref 78–100)
NONHDLC SERPL-MCNC: 127 MG/DL
PLATELET # BLD AUTO: 412 10E3/UL (ref 150–450)
RBC # BLD AUTO: 4.3 10E6/UL (ref 3.8–5.2)
TRIGL SERPL-MCNC: 186 MG/DL
WBC # BLD AUTO: 6.5 10E3/UL (ref 4–11)

## 2023-08-03 PROCEDURE — G0439 PPPS, SUBSEQ VISIT: HCPCS | Performed by: INTERNAL MEDICINE

## 2023-08-03 PROCEDURE — 85027 COMPLETE CBC AUTOMATED: CPT | Performed by: INTERNAL MEDICINE

## 2023-08-03 PROCEDURE — 80061 LIPID PANEL: CPT | Performed by: INTERNAL MEDICINE

## 2023-08-03 PROCEDURE — 36415 COLL VENOUS BLD VENIPUNCTURE: CPT | Performed by: INTERNAL MEDICINE

## 2023-08-03 RX ORDER — IBUPROFEN 200 MG
1 CAPSULE ORAL 2 TIMES DAILY
COMMUNITY

## 2023-08-03 ASSESSMENT — ENCOUNTER SYMPTOMS
BREAST MASS: 0
WEAKNESS: 0
SORE THROAT: 0
DYSURIA: 0
SHORTNESS OF BREATH: 0
DIZZINESS: 0
MYALGIAS: 0
NERVOUS/ANXIOUS: 0
PARESTHESIAS: 0
JOINT SWELLING: 0
ABDOMINAL PAIN: 0
EYE PAIN: 0
FREQUENCY: 0
HEARTBURN: 0
CONSTIPATION: 0
NAUSEA: 0
HEMATURIA: 0
FEVER: 0
CHILLS: 0
HEMATOCHEZIA: 0
HEADACHES: 0
PALPITATIONS: 0
ARTHRALGIAS: 1
DIARRHEA: 0
COUGH: 0

## 2023-08-03 ASSESSMENT — ACTIVITIES OF DAILY LIVING (ADL): CURRENT_FUNCTION: NO ASSISTANCE NEEDED

## 2023-08-03 NOTE — PROGRESS NOTES
"SUBJECTIVE:   Mago is a 67 year old who presents for Preventive Visit.      8/3/2023     1:12 PM   Additional Questions   Roomed by Louann ZACARIAS   Accompanied by Self         8/3/2023     1:12 PM   Patient Reported Additional Medications   Patient reports taking the following new medications None       Are you in the first 12 months of your Medicare coverage?  No    Healthy Habits:     In general, how would you rate your overall health?  Good    Frequency of exercise:  6-7 days/week    Duration of exercise:  45-60 minutes    Do you usually eat at least 4 servings of fruit and vegetables a day, include whole grains    & fiber and avoid regularly eating high fat or \"junk\" foods?  Yes    Taking medications regularly:  Yes    Medication side effects:  None    Ability to successfully perform activities of daily living:  No assistance needed    Home Safety:  No safety concerns identified    Hearing Impairment:  No hearing concerns    In the past 6 months, have you been bothered by leaking of urine? Yes    In general, how would you rate your overall mental or emotional health?  Excellent    Additional concerns today:  Yes    Have you ever done Advance Care Planning? (For example, a Health Directive, POLST, or a discussion with a medical provider or your loved ones about your wishes): Yes, patient states has an Advance Care Planning document and will bring a copy to the clinic.       Fall risk  Fallen 2 or more times in the past year?: No  Any fall with injury in the past year?: Yes    Cognitive Screening   1) Repeat 3 items (Leader, Season, Table)    2) Clock draw: NORMAL  3) 3 item recall: Recalls 3 objects  Results: 3 items recalled: COGNITIVE IMPAIRMENT LESS LIKELY    Mini-CogTM Copyright S [place]. Licensed by the author for use in [facility]; reprinted with permission (sabino@.[facility]). All rights reserved.      Overall has been doing well but very busy with recent doctors' visits. R arm is healing well, and " laceration site also appears well healed. Biopsy results on her thyroid are reassuring. Echocardiogram for newly noted heart murmur also reassuring. She is hoping to not have much else to deal with soon.       Do you have sleep apnea, excessive snoring or daytime drowsiness? : no    Reviewed and updated as needed this visit by clinical staff    Allergies  Meds              Reviewed and updated as needed this visit by Provider     Meds             Social History     Tobacco Use    Smoking status: Never    Smokeless tobacco: Never   Substance Use Topics    Alcohol use: Yes     Comment: Seldom             7/27/2023     8:37 PM   Alcohol Use   Prescreen: >3 drinks/day or >7 drinks/week? No     Do you have a current opioid prescription? No  Do you use any other controlled substances or medications that are not prescribed by a provider? None          Current providers sharing in care for this patient include:   Patient Care Team:  Hanny Kunz MD as PCP - General (Internal Medicine)  Joyce Barillas MD as MD (Hematology)  Karena Tafoya, RN as Continuity Care Coordinator  Joyce Barillas MD as Assigned Cancer Care Provider  Hanny Kunz MD as Assigned PCP  Yeo, Albert, MD as Assigned Musculoskeletal Provider    The following health maintenance items are reviewed in Epic and correct as of today:  Health Maintenance   Topic Date Due    MICROALBUMIN  Never done    LIPID  10/22/2020    COVID-19 Vaccine (6 - Moderna series) 02/17/2023    ANNUAL REVIEW OF HM ORDERS  06/22/2023    MEDICARE ANNUAL WELLNESS VISIT  07/11/2023    INFLUENZA VACCINE (1) 09/01/2023    BMP  06/15/2024    FALL RISK ASSESSMENT  08/03/2024    MAMMO SCREENING  05/15/2025    DTAP/TDAP/TD IMMUNIZATION (4 - Td or Tdap) 07/03/2025    ADVANCE CARE PLANNING  07/11/2027    COLORECTAL CANCER SCREENING  05/03/2029    DEXA  04/25/2037    HEPATITIS C SCREENING  Completed    PHQ-2 (once per calendar year)  Completed    Pneumococcal  Vaccine: 65+ Years  Completed    URINALYSIS  Completed    ZOSTER IMMUNIZATION  Completed    IPV IMMUNIZATION  Aged Out    MENINGITIS IMMUNIZATION  Aged Out     Patient Active Problem List   Diagnosis    Malignant neoplasm of central portion of left female breast (HCC)    Family history of multiple sclerosis    Generalized anxiety disorder    Celiac disease    Osteopenia of multiple sites    Allergic rhinitis    Anemia of chronic disease    Hereditary and idiopathic peripheral neuropathy    HX: breast cancer    Other specified disorders of rotator cuff syndrome of shoulder and allied disorders    Postmenopausal atrophic vaginitis    Thyroid nodule     Past Surgical History:   Procedure Laterality Date    ENT SURGERY      wisdom teeth    LASIK  1995    right eye    LUMPECTOMY BREAST  2011    left ; breast cancer        Social History     Tobacco Use    Smoking status: Never    Smokeless tobacco: Never   Substance Use Topics    Alcohol use: Yes     Comment: Seldom     Family History   Problem Relation Age of Onset    Genetic Disorder Mother         MS    Prostate Cancer Father     Genetic Disorder Sister         MS    Diabetes Daughter         Type 1               4/12/2021    10:02 AM   Breast CA Risk Assessment (FHS-7)   Do you have a family history of breast, colon, or ovarian cancer? No / Unknown         Mammogram Screening - Alternate mammogram schedule due to breast cancer history  Pertinent mammograms are reviewed under the imaging tab.    Review of Systems   Constitutional:  Negative for chills and fever.   HENT:  Negative for congestion, ear pain, hearing loss and sore throat.    Eyes:  Negative for pain and visual disturbance.   Respiratory:  Negative for cough and shortness of breath.    Cardiovascular:  Negative for chest pain, palpitations and peripheral edema.   Gastrointestinal:  Negative for abdominal pain, constipation, diarrhea, heartburn, hematochezia and nausea.   Breasts:  Negative for tenderness,  "breast mass and discharge.   Genitourinary:  Negative for dysuria, frequency, genital sores, hematuria, pelvic pain, urgency, vaginal bleeding and vaginal discharge.   Musculoskeletal:  Positive for arthralgias. Negative for joint swelling and myalgias.   Skin:  Negative for rash.   Neurological:  Negative for dizziness, weakness, headaches and paresthesias.   Psychiatric/Behavioral:  Negative for mood changes. The patient is not nervous/anxious.        OBJECTIVE:   /60 (BP Location: Right arm, Patient Position: Sitting, Cuff Size: Adult Regular)   Pulse 71   Temp 97.1  F (36.2  C) (Temporal)   Resp 14   Ht 1.727 m (5' 8\")   Wt 70.3 kg (155 lb)   SpO2 99%   BMI 23.57 kg/m   Estimated body mass index is 23.57 kg/m  as calculated from the following:    Height as of this encounter: 1.727 m (5' 8\").    Weight as of this encounter: 70.3 kg (155 lb).  Physical Exam  GENERAL: healthy, alert and no distress  EYES: Eyes grossly normal to inspection, PERRL and conjunctivae and sclerae normal  HENT: ear canals and TM's normal, nose and mouth without ulcers or lesions  NECK: no adenopathy, no asymmetry, masses, or scars and thyroid normal to palpation  RESP: lungs clear to auscultation - no rales, rhonchi or wheezes  CV: regular rate and rhythm, normal S1 S2, no S3 or S4, soft 2/6 early systolic heart murmur noted on exam  ABDOMEN: soft, nontender, no hepatosplenomegaly, no masses and bowel sounds normal  MS: no gross musculoskeletal defects noted, no edema  SKIN: no suspicious lesions or rashes  NEURO: Normal strength and tone, mentation intact and speech normal  PSYCH: mentation appears normal, affect normal/bright      ASSESSMENT / PLAN:   Routine general medical examination at a health care facility  Counseling as below, up to date on vaccines and screening. DEXA and mammograms ordered through her oncologist.     Lipid screening    - Lipid panel reflex to direct LDL Non-fasting    Heart murmur  Recent " echocardiogram reassuring, will obtain CBC to screen for anemia as etiology, if wnl will continue to monitor clinically.   - CBC with platelets    Thyroid nodule  Discussed that 3/4 noted nodules were biopsied and benign, per guidelines, 4th nodule requires annual surveillance x5 years. She will consider.           COUNSELING:  Reviewed preventive health counseling, as reflected in patient instructions       Regular exercise       Healthy diet/nutrition       Vision screening       Hearing screening        She reports that she has never smoked. She has never used smokeless tobacco.      Appropriate preventive services were discussed with this patient, including applicable screening as appropriate for cardiovascular disease, diabetes, osteopenia/osteoporosis, and glaucoma.  As appropriate for age/gender, discussed screening for colorectal cancer, prostate cancer, breast cancer, and cervical cancer. Checklist reviewing preventive services available has been given to the patient.    Reviewed patients plan of care and provided an AVS. The Basic Care Plan (routine screening as documented in Health Maintenance) for Mago meets the Care Plan requirement. This Care Plan has been established and reviewed with the Patient.          Hanny Nava MD  [facility]    Identified Health Risks:  I have reviewed Opioid Use Disorder and Substance Use Disorder risk factors and made any needed referrals.

## 2023-09-14 ENCOUNTER — TRANSFERRED RECORDS (OUTPATIENT)
Dept: HEALTH INFORMATION MANAGEMENT | Facility: CLINIC | Age: 68
End: 2023-09-14
Payer: COMMERCIAL

## 2023-10-30 ENCOUNTER — OFFICE VISIT (OUTPATIENT)
Dept: ORTHOPEDICS | Facility: CLINIC | Age: 68
End: 2023-10-30
Payer: COMMERCIAL

## 2023-10-30 DIAGNOSIS — M17.11 PRIMARY OSTEOARTHRITIS OF RIGHT KNEE: ICD-10-CM

## 2023-10-30 DIAGNOSIS — M17.12 PRIMARY OSTEOARTHRITIS OF LEFT KNEE: Primary | ICD-10-CM

## 2023-10-30 PROCEDURE — 20611 DRAIN/INJ JOINT/BURSA W/US: CPT | Mod: 50 | Performed by: FAMILY MEDICINE

## 2023-10-30 RX ORDER — LIDOCAINE HYDROCHLORIDE 10 MG/ML
5 INJECTION, SOLUTION INFILTRATION; PERINEURAL
Status: SHIPPED | OUTPATIENT
Start: 2023-10-30

## 2023-10-30 RX ADMIN — LIDOCAINE HYDROCHLORIDE 5 ML: 10 INJECTION, SOLUTION INFILTRATION; PERINEURAL at 08:50

## 2023-10-30 NOTE — PROGRESS NOTES
ASSESSMENT & PLAN  Patient Instructions     1. Primary osteoarthritis of left knee    2. Primary osteoarthritis of right knee      -Patient is following up for chronic bilateral knee pain due to arthritis  -Patient tolerated bilateral Synvisc 1 injections today without complications.  Patient was given postprocedure instructions  -Patient will start home exercise program to strengthen and stabilize her knees.  Handouts were given today  -Patient may call us if she would like to start a formal physical therapy program  -Patient is part of [employer] and was thinking of getting a .  I would recommend completing physical therapy and home exercise program first to build up a foundation of strength before working with a  to get stronger  -Call direct clinic number [[phone]] at any time with questions or concerns.    Albert Yeo MD [facility]  [facility]        -----    SUBJECTIVE:  Mago Santiago is a 68 year old female who is seen in follow-up for bilateral knee injections.They were last seen 7/31/2023 for evaluation of the knees.     Since their last visit reports 0% - (About the same as last time). They have tried rest/activity avoidance, Tylenol, ibuprofen, home exercises, and previous imaging (xray 9/8/21).      The patient is seen by themselves.    Patient's past medical, surgical, social, and family histories were reviewed today and no changes are noted.        Independent visualization of the below image:    Large Joint Injection/Arthocentesis: bilateral knee    Date/Time: 10/30/2023 8:50 AM    Performed by: Yeo, Albert, MD  Authorized by: Yeo, Albert, MD    Indications:  Pain and osteoarthritis  Needle Size:  22 G  Guidance: ultrasound    Approach:  Anterolateral  Location:  Knee  Laterality:  Bilateral      Medications (Right):  48 mg hylan 48 MG/6ML; 5 mL lidocaine 1 %  Medications (Left):  48 mg hylan 48 MG/6ML; 5 mL  lidocaine 1 %  Outcome:  Tolerated well, no immediate complications  Procedure discussed: discussed risks, benefits, and alternatives    Consent Given by:  Patient  Timeout: timeout called immediately prior to procedure    Prep: patient was prepped and draped in usual sterile fashion     Ultrasound was used to ensure safe and accurate needle placement and injection. Ultrasound images of the procedure were permanently stored.          Albert Yeo MD, [facility]opedic [facility]

## 2023-10-30 NOTE — LETTER
10/30/2023         RE: Mago Santiago  [address] [place] [address]        Dear Colleague,    Thank you for referring your patient, Mago Santiago, to the [facility] [place]. Please see a copy of my visit note below.    ASSESSMENT & PLAN  Patient Instructions     1. Primary osteoarthritis of left knee    2. Primary osteoarthritis of right knee      -Patient is following up for chronic bilateral knee pain due to arthritis  -Patient tolerated bilateral Synvisc 1 injections today without complications.  Patient was given postprocedure instructions  -Patient will start home exercise program to strengthen and stabilize her knees.  Handouts were given today  -Patient may call us if she would like to start a formal physical therapy program  -Patient is part of [employer] and was thinking of getting a .  I would recommend completing physical therapy and home exercise program first to build up a foundation of strength before working with a  to get stronger  -Call direct clinic number [[phone]] at any time with questions or concerns.    Albert Yeo MD [facility]  [facility]        -----    SUBJECTIVE:  Mago Santiago is a 68 year old female who is seen in follow-up for bilateral knee injections.They were last seen 7/31/2023 for evaluation of the knees.     Since their last visit reports 0% - (About the same as last time). They have tried rest/activity avoidance, Tylenol, ibuprofen, home exercises, and previous imaging (xray 9/8/21).      The patient is seen by themselves.    Patient's past medical, surgical, social, and family histories were reviewed today and no changes are noted.        Independent visualization of the below image:    Large Joint Injection/Arthocentesis: bilateral knee    Date/Time: 10/30/2023 8:50 AM    Performed by: Yeo, Albert, MD  Authorized by: Yeo, Albert, MD     Indications:  Pain and osteoarthritis  Needle Size:  22 G  Guidance: ultrasound    Approach:  Anterolateral  Location:  Knee  Laterality:  Bilateral      Medications (Right):  48 mg hylan 48 MG/6ML; 5 mL lidocaine 1 %  Medications (Left):  48 mg hylan 48 MG/6ML; 5 mL lidocaine 1 %  Outcome:  Tolerated well, no immediate complications  Procedure discussed: discussed risks, benefits, and alternatives    Consent Given by:  Patient  Timeout: timeout called immediately prior to procedure    Prep: patient was prepped and draped in usual sterile fashion     Ultrasound was used to ensure safe and accurate needle placement and injection. Ultrasound images of the procedure were permanently stored.          Albert Yeo MD, [facility]        Again, thank you for allowing me to participate in the care of your patient.        Sincerely,        Albert Yeo, MD

## 2023-10-30 NOTE — PATIENT INSTRUCTIONS
1. Primary osteoarthritis of left knee    2. Primary osteoarthritis of right knee      -Patient is following up for chronic bilateral knee pain due to arthritis  -Patient tolerated bilateral Synvisc 1 injections today without complications.  Patient was given postprocedure instructions  -Patient will start home exercise program to strengthen and stabilize her knees.  Handouts were given today  -Patient may call us if she would like to start a formal physical therapy program  -Patient is part of [employer] and was thinking of getting a .  I would recommend completing physical therapy and home exercise program first to build up a foundation of strength before working with a  to get stronger  -Call direct clinic number [[phone]] at any time with questions or concerns.    Albert Yeo MD CAQSM  [place] [facility]  [facility] [place]

## 2024-01-16 ENCOUNTER — ONCOLOGY VISIT (OUTPATIENT)
Dept: ONCOLOGY | Facility: CLINIC | Age: 69
End: 2024-01-16
Attending: INTERNAL MEDICINE
Payer: COMMERCIAL

## 2024-01-16 VITALS
DIASTOLIC BLOOD PRESSURE: 71 MMHG | OXYGEN SATURATION: 99 % | HEART RATE: 66 BPM | TEMPERATURE: 98.6 F | RESPIRATION RATE: 16 BRPM | BODY MASS INDEX: 23.69 KG/M2 | WEIGHT: 156.3 LBS | HEIGHT: 68 IN | SYSTOLIC BLOOD PRESSURE: 114 MMHG

## 2024-01-16 DIAGNOSIS — C50.112 MALIGNANT NEOPLASM OF CENTRAL PORTION OF LEFT FEMALE BREAST, UNSPECIFIED ESTROGEN RECEPTOR STATUS (H): Primary | ICD-10-CM

## 2024-01-16 DIAGNOSIS — Z12.31 ENCOUNTER FOR SCREENING MAMMOGRAM FOR MALIGNANT NEOPLASM OF BREAST: ICD-10-CM

## 2024-01-16 PROCEDURE — 99213 OFFICE O/P EST LOW 20 MIN: CPT | Performed by: INTERNAL MEDICINE

## 2024-01-16 PROCEDURE — G0463 HOSPITAL OUTPT CLINIC VISIT: HCPCS | Performed by: INTERNAL MEDICINE

## 2024-01-16 ASSESSMENT — PAIN SCALES - GENERAL: PAINLEVEL: NO PAIN (0)

## 2024-01-16 NOTE — LETTER
1/16/2024         RE: Mago Santiago  [address] [place] [address]        Dear Colleague,    Thank you for referring your patient, Mago Santiago, to the [facility]. Please see a copy of my visit note below.    Mago Santiago is 68 years old and here today for interim followup.     CHIEF COMPLAINT:    History of left-sided breast cancer diagnosed in 2011, now approaching 13 years out from her diagnosis.     HISTORY OF PRESENT ILLNESS:  Mago is a 67-year-old patient who is postmenopausal.  She is about 13 years out from her diagnosis of left-sided breast cancer, which was estrogen receptor positive, progesterone receptor weakly positive, HER2 receptor negative.  She continues now on yearly observation.  She did do a mammogram in 05/23 , I have reviewed that with her today and that was negative.  She also did a DEXA scanin 4/22 and  she had some lowering of her bone density in the lumbar spine.      She is an avid exerciser and I have encouraged her to continue that, as well as some strength training and calcium and vitamin D for bone health.  She is having no major problems today.  She is feeling well.  She denies any respiratory, cardiovascular, genitourinary, musculoskeletal or neurological symptoms that are worrisome.     REVIEW OF SYSTEMS:  A 10-point comprehensive review of systems is otherwise unremarkable.     PAIN ASSESSMENT:  She is in no pain today.     MEDICATIONS AND ALLERGIES:  Outlined in the nursing records.     SOCIAL HISTORY:  She is retired from teaching.  She lives alone.  She is a nonsmoker and is an avid exerciser.     PHYSICAL EXAMINATION:    GENERAL:  She is a well-appearing lady in no acute distress.  VITAL SIGNS:  Stable.  NECK:  Has no masses or goiter.  CHEST:  Clear to auscultation and percussion bilaterally.  HEART:  Sounds 1 2, normal, no added sounds or murmurs.  BREASTS:  The patient is status post left-sided lumpectomy.  The scar looks  good.  No further palpable masses.  Right breast normal, no palpable masses.  Right and left axillae negative.  GASTROINTESTINAL:  Abdomen is soft and nontender.  No hepatosplenomegaly.  EXTREMITIES:  Legs without tenderness or edema.  PSYCHIATRIC:  Normal.      Data review:    I have reviewed her mammogram that was done in May 2023 and that was normal have also reviewed again her bone density from April 2022.      Impression and plan:    Mago is 68 years old and now number years out from her breast cancer which was on the left side and diagnosed in 2011 she is doing very well from a breast cancer standpoint she now continues on yearly observation with me ,we keep up-to-date on her routine mammograms and we will intermittently keep doing her bone density test.    I will see her back in clinic in a year.      Joyce Braillas MD     Again, thank you for allowing me to participate in the care of your patient.        Sincerely,        Joyce Barillas MD

## 2024-01-16 NOTE — PROGRESS NOTES
Mago Santiago is 68 years old and here today for interim followup.     CHIEF COMPLAINT:    History of left-sided breast cancer diagnosed in 2011, now approaching 13 years out from her diagnosis.     HISTORY OF PRESENT ILLNESS:  Mago is a 67-year-old patient who is postmenopausal.  She is about 13 years out from her diagnosis of left-sided breast cancer, which was estrogen receptor positive, progesterone receptor weakly positive, HER2 receptor negative.  She continues now on yearly observation.  She did do a mammogram in 05/23 , I have reviewed that with her today and that was negative.  She also did a DEXA scanin 4/22 and  she had some lowering of her bone density in the lumbar spine.      She is an avid exerciser and I have encouraged her to continue that, as well as some strength training and calcium and vitamin D for bone health.  She is having no major problems today.  She is feeling well.  She denies any respiratory, cardiovascular, genitourinary, musculoskeletal or neurological symptoms that are worrisome.     REVIEW OF SYSTEMS:  A 10-point comprehensive review of systems is otherwise unremarkable.     PAIN ASSESSMENT:  She is in no pain today.     MEDICATIONS AND ALLERGIES:  Outlined in the nursing records.     SOCIAL HISTORY:  She is retired from [employer].  She lives alone.  She is a nonsmoker and is an avid exerciser.     PHYSICAL EXAMINATION:    GENERAL:  She is a well-appearing lady in no acute distress.  VITAL SIGNS:  Stable.  NECK:  Has no masses or goiter.  CHEST:  Clear to auscultation and percussion bilaterally.  HEART:  Sounds 1 2, normal, no added sounds or murmurs.  BREASTS:  The patient is status post left-sided lumpectomy.  The scar looks good.  No further palpable masses.  Right breast normal, no palpable masses.  Right and left axillae negative.  GASTROINTESTINAL:  Abdomen is soft and nontender.  No hepatosplenomegaly.  EXTREMITIES:  Legs without tenderness or edema.  PSYCHIATRIC:   Normal.      Data review:    I have reviewed her mammogram that was done in May 2023 and that was normal have also reviewed again her bone density from April 2022.      Impression and plan:    Mago is 68 years old and now number years out from her breast cancer which was on the left side and diagnosed in 2011 she is doing very well from a breast cancer standpoint she now continues on yearly observation with me ,we keep up-to-date on her routine mammograms and we will intermittently keep doing her bone density test.    I will see her back in clinic in a year.      Joyce Barillas MD

## 2024-01-16 NOTE — NURSING NOTE
"Oncology Rooming Note    January 16, 2024 9:24 AM   Mago Santiago is a 68 year old female who presents for:    Chief Complaint   Patient presents with    Oncology Clinic Visit     HX: breast cancer  Malignant neoplasm of central portion of left female breast        Initial Vitals: /71   Pulse 66   Temp 98.6  F (37  C) (Oral)   Resp 16   Ht 1.727 m (5' 7.99\")   Wt 70.9 kg (156 lb 4.8 oz)   SpO2 99%   BMI 23.77 kg/m   Estimated body mass index is 23.77 kg/m  as calculated from the following:    Height as of this encounter: 1.727 m (5' 7.99\").    Weight as of this encounter: 70.9 kg (156 lb 4.8 oz). Body surface area is 1.84 meters squared.  No Pain (0) Comment: Data Unavailable   No LMP recorded. Patient is postmenopausal.  Allergies reviewed: Yes  Medications reviewed: Yes    Medications: Medication refills not needed today.  Pharmacy name entered into [employer]:    [id number] - [place] [place] - [address] AT [id number] - [place], [place] - [id number]  AT [facility]    Frailty Screening:   Is the patient here for a new oncology consult visit in cancer care? 2. No      Clinical concerns: 1 year follow up       Sakina Solorzano MA              "

## 2024-04-15 ENCOUNTER — PATIENT OUTREACH (OUTPATIENT)
Dept: CARE COORDINATION | Facility: CLINIC | Age: 69
End: 2024-04-15
Payer: COMMERCIAL

## 2024-05-13 ENCOUNTER — PATIENT OUTREACH (OUTPATIENT)
Dept: CARE COORDINATION | Facility: CLINIC | Age: 69
End: 2024-05-13
Payer: COMMERCIAL

## 2024-05-30 ENCOUNTER — ANCILLARY PROCEDURE (OUTPATIENT)
Dept: MAMMOGRAPHY | Facility: CLINIC | Age: 69
End: 2024-05-30
Attending: INTERNAL MEDICINE
Payer: COMMERCIAL

## 2024-05-30 DIAGNOSIS — C50.112 MALIGNANT NEOPLASM OF CENTRAL PORTION OF LEFT FEMALE BREAST, UNSPECIFIED ESTROGEN RECEPTOR STATUS (H): ICD-10-CM

## 2024-05-30 DIAGNOSIS — Z12.31 ENCOUNTER FOR SCREENING MAMMOGRAM FOR MALIGNANT NEOPLASM OF BREAST: ICD-10-CM

## 2024-05-30 PROCEDURE — 77067 SCR MAMMO BI INCL CAD: CPT | Mod: TC | Performed by: RADIOLOGY

## 2024-05-30 PROCEDURE — 77063 BREAST TOMOSYNTHESIS BI: CPT | Mod: TC | Performed by: RADIOLOGY

## 2024-07-22 ENCOUNTER — TELEPHONE (OUTPATIENT)
Dept: ORTHOPEDICS | Facility: CLINIC | Age: 69
End: 2024-07-22
Payer: COMMERCIAL

## 2024-07-22 NOTE — TELEPHONE ENCOUNTER
Other: pt is asking if her visit tomorrow is coded as an office visit?  It is for a cortisone injection.  She is seeing the copay is $200 which is more than before in 2022.    She has called ins about this already.      Could we send this information to you in ivWatch or would you prefer to receive a phone call?:   Patient would like to be contacted via ivWatch.  Ok to call if its too complicated

## 2024-07-23 ENCOUNTER — OFFICE VISIT (OUTPATIENT)
Dept: ORTHOPEDICS | Facility: CLINIC | Age: 69
End: 2024-07-23
Payer: COMMERCIAL

## 2024-07-23 DIAGNOSIS — M17.11 PRIMARY OSTEOARTHRITIS OF RIGHT KNEE: ICD-10-CM

## 2024-07-23 DIAGNOSIS — M17.12 PRIMARY OSTEOARTHRITIS OF LEFT KNEE: Primary | ICD-10-CM

## 2024-07-23 PROCEDURE — 20611 DRAIN/INJ JOINT/BURSA W/US: CPT | Mod: 50 | Performed by: FAMILY MEDICINE

## 2024-07-23 RX ORDER — LIDOCAINE HYDROCHLORIDE 10 MG/ML
5 INJECTION, SOLUTION INFILTRATION; PERINEURAL
Status: SHIPPED | OUTPATIENT
Start: 2024-07-23

## 2024-07-23 RX ORDER — METHYLPREDNISOLONE ACETATE 40 MG/ML
40 INJECTION, SUSPENSION INTRA-ARTICULAR; INTRALESIONAL; INTRAMUSCULAR; SOFT TISSUE
Status: SHIPPED | OUTPATIENT
Start: 2024-07-23

## 2024-07-23 RX ORDER — ROPIVACAINE HYDROCHLORIDE 5 MG/ML
4 INJECTION, SOLUTION EPIDURAL; INFILTRATION; PERINEURAL
Status: SHIPPED | OUTPATIENT
Start: 2024-07-23

## 2024-07-23 RX ADMIN — ROPIVACAINE HYDROCHLORIDE 4 ML: 5 INJECTION, SOLUTION EPIDURAL; INFILTRATION; PERINEURAL at 09:19

## 2024-07-23 RX ADMIN — METHYLPREDNISOLONE ACETATE 40 MG: 40 INJECTION, SUSPENSION INTRA-ARTICULAR; INTRALESIONAL; INTRAMUSCULAR; SOFT TISSUE at 09:19

## 2024-07-23 RX ADMIN — LIDOCAINE HYDROCHLORIDE 5 ML: 10 INJECTION, SOLUTION INFILTRATION; PERINEURAL at 09:19

## 2024-07-23 NOTE — PROGRESS NOTES
ASSESSMENT & PLAN  Patient Instructions     1. Primary osteoarthritis of left knee    2. Primary osteoarthritis of right knee      -Patient is following up for chronic bilateral knee pain due to arthritis  -Patient tolerated bilateral knee intra-articular cortisone injection today without complications.  Patient was given postprocedure instructions  -Patient reports no relief with viscosupplementation injections and so we will avoid in the future  -Patient will continue with home exercise program  -Patient will follow-up when pain returns  -Call direct clinic number [[phone]] at any time with questions or concerns.    Albert Yeo MD [facility]  [facility]        -----    SUBJECTIVE:  Mago Santiago is a 68 year old female who is seen for bilateral knee cortisone injections. Patient previously had viscosupplementation injection (most recent: 10/30/23) that provided no noticeable relief.      Patient rates pain as 3/10 pre-procedure. Patient rates pain as 3/10 post-procedure.    Large Joint Injection/Arthocentesis: bilateral knee    Date/Time: 7/23/2024 9:19 AM    Performed by: Yeo, Albert, MD  Authorized by: Yeo, Albert, MD    Indications:  Pain and osteoarthritis  Needle Size:  22 G  Guidance: ultrasound    Approach:  Anterolateral  Location:  Knee  Laterality:  Bilateral      Medications (Right):  40 mg methylPREDNISolone 40 MG/ML; 5 mL lidocaine 1 %; 4 mL ROPivacaine 5 MG/ML  Medications (Left):  40 mg methylPREDNISolone 40 MG/ML; 5 mL lidocaine 1 %; 4 mL ROPivacaine 5 MG/ML  Outcome:  Tolerated well, no immediate complications  Procedure discussed: discussed risks, benefits, and alternatives    Consent Given by:  Patient  Timeout: timeout called immediately prior to procedure    Prep: patient was prepped and draped in usual sterile fashion     Ultrasound was used to ensure safe and accurate needle placement and injection. Ultrasound images of the  procedure were permanently stored.          Albert Yeo MD, [facility]

## 2024-07-23 NOTE — LETTER
7/23/2024      Mago Santiago  [address] [place] [address]      Dear Colleague,    Thank you for referring your patient, Mago Santiago, to the [facility] [place]. Please see a copy of my visit note below.    ASSESSMENT & PLAN  Patient Instructions     1. Primary osteoarthritis of left knee    2. Primary osteoarthritis of right knee      -Patient is following up for chronic bilateral knee pain due to arthritis  -Patient tolerated bilateral knee intra-articular cortisone injection today without complications.  Patient was given postprocedure instructions  -Patient reports no relief with viscosupplementation injections and so we will avoid in the future  -Patient will continue with home exercise program  -Patient will follow-up when pain returns  -Call direct clinic number [[phone]] at any time with questions or concerns.    Albert Yeo MD [facility] Orthoped[facility] and Sports Medicine  [facility]        -----    SUBJECTIVE:  Mago Santiago is a 68 year old female who is seen for bilateral knee cortisone injections. Patient previously had viscosupplementation injection (most recent: 10/30/23) that provided no noticeable relief.      Patient rates pain as 3/10 pre-procedure. Patient rates pain as 3/10 post-procedure.    Large Joint Injection/Arthocentesis: bilateral knee    Date/Time: 7/23/2024 9:19 AM    Performed by: Yeo, Albert, MD  Authorized by: Yeo, Albert, MD    Indications:  Pain and osteoarthritis  Needle Size:  22 G  Guidance: ultrasound    Approach:  Anterolateral  Location:  Knee  Laterality:  Bilateral      Medications (Right):  40 mg methylPREDNISolone 40 MG/ML; 5 mL lidocaine 1 %; 4 mL ROPivacaine 5 MG/ML  Medications (Left):  40 mg methylPREDNISolone 40 MG/ML; 5 mL lidocaine 1 %; 4 mL ROPivacaine 5 MG/ML  Outcome:  Tolerated well, no immediate complications  Procedure discussed: discussed risks, benefits, and alternatives    Consent  Given by:  Patient  Timeout: timeout called immediately prior to procedure    Prep: patient was prepped and draped in usual sterile fashion     Ultrasound was used to ensure safe and accurate needle placement and injection. Ultrasound images of the procedure were permanently stored.          Albert Yeo MD, [facility]      Again, thank you for allowing me to participate in the care of your patient.        Sincerely,        Albert Yeo, MD

## 2024-07-23 NOTE — PATIENT INSTRUCTIONS
1. Primary osteoarthritis of left knee    2. Primary osteoarthritis of right knee      -Patient is following up for chronic bilateral knee pain due to arthritis  -Patient tolerated bilateral knee intra-articular cortisone injection today without complications.  Patient was given postprocedure instructions  -Patient reports no relief with viscosupplementation injections and so we will avoid in the future  -Patient will continue with home exercise program  -Patient will follow-up when pain returns  -Call direct clinic number [[phone]] at any time with questions or concerns.    Albert Yeo MD CAQSM  [facility]  [facility] [place]

## 2024-08-14 ENCOUNTER — MYC REFILL (OUTPATIENT)
Dept: PEDIATRICS | Facility: CLINIC | Age: 69
End: 2024-08-14
Payer: COMMERCIAL

## 2024-08-14 ENCOUNTER — MYC MEDICAL ADVICE (OUTPATIENT)
Dept: PEDIATRICS | Facility: CLINIC | Age: 69
End: 2024-08-14
Payer: COMMERCIAL

## 2024-08-14 DIAGNOSIS — J34.89 SINUS PRESSURE: ICD-10-CM

## 2024-08-14 RX ORDER — FLUTICASONE PROPIONATE 50 MCG
1 SPRAY, SUSPENSION (ML) NASAL DAILY
Qty: 16 ML | Refills: 1 | Status: SHIPPED | OUTPATIENT
Start: 2024-08-14

## 2024-08-17 SDOH — HEALTH STABILITY: PHYSICAL HEALTH: ON AVERAGE, HOW MANY DAYS PER WEEK DO YOU ENGAGE IN MODERATE TO STRENUOUS EXERCISE (LIKE A BRISK WALK)?: 7 DAYS

## 2024-08-17 SDOH — HEALTH STABILITY: PHYSICAL HEALTH: ON AVERAGE, HOW MANY MINUTES DO YOU ENGAGE IN EXERCISE AT THIS LEVEL?: 90 MIN

## 2024-08-17 ASSESSMENT — SOCIAL DETERMINANTS OF HEALTH (SDOH): HOW OFTEN DO YOU GET TOGETHER WITH FRIENDS OR RELATIVES?: THREE TIMES A WEEK

## 2024-08-22 ENCOUNTER — OFFICE VISIT (OUTPATIENT)
Dept: PEDIATRICS | Facility: CLINIC | Age: 69
End: 2024-08-22
Attending: INTERNAL MEDICINE
Payer: COMMERCIAL

## 2024-08-22 VITALS
SYSTOLIC BLOOD PRESSURE: 88 MMHG | WEIGHT: 153.1 LBS | HEART RATE: 82 BPM | DIASTOLIC BLOOD PRESSURE: 60 MMHG | OXYGEN SATURATION: 99 % | BODY MASS INDEX: 24.03 KG/M2 | HEIGHT: 67 IN | RESPIRATION RATE: 16 BRPM | TEMPERATURE: 98.2 F

## 2024-08-22 DIAGNOSIS — M72.0 DUPUYTREN CONTRACTURE: ICD-10-CM

## 2024-08-22 DIAGNOSIS — Z00.00 ROUTINE GENERAL MEDICAL EXAMINATION AT A HEALTH CARE FACILITY: Primary | ICD-10-CM

## 2024-08-22 DIAGNOSIS — E04.1 THYROID NODULE: ICD-10-CM

## 2024-08-22 DIAGNOSIS — J02.9 SORE THROAT: ICD-10-CM

## 2024-08-22 PROCEDURE — 99213 OFFICE O/P EST LOW 20 MIN: CPT | Mod: 25 | Performed by: INTERNAL MEDICINE

## 2024-08-22 PROCEDURE — G0439 PPPS, SUBSEQ VISIT: HCPCS | Performed by: INTERNAL MEDICINE

## 2024-08-22 NOTE — PATIENT INSTRUCTIONS
You will be called to schedule thyroid ultrasound.    Bone density in 2027.    No labs today - these looked great last year!    Omeprazole refilled.    See hand specialist at [facility] for hand nodule.

## 2024-08-22 NOTE — PROGRESS NOTES
Preventive Care Visit  [facility] SINAI Nava MD, Internal Medicine - Pediatrics  Aug 22, 2024      Assessment & Plan     Routine general medical examination at a health care facility  Counseling as below, DEXA and mammograms through oncology. Vaccines up to date.     Thyroid nodule  Due for annual screening of 4th thyroid nodule (3 large nodules has bx in 2023)  - US Thyroid; Future    Sore throat  Uses PPI intermittently on as needed basis for symptom management.   - omeprazole (PRILOSEC) 20 MG DR capsule; Take 1 capsule (20 mg) by mouth 2 times daily.    Dupuytren contracture  Will refer to ortho for management.   - Orthopedic  Referral; Future            Counseling  Appropriate preventive services were addressed with this patient via screening, questionnaire, or discussion as appropriate for fall prevention, nutrition, physical activity, Tobacco-use cessation, social engagement, weight loss and cognition.  Checklist reviewing preventive services available has been given to the patient.  Reviewed patient's diet, addressing concerns and/or questions.   Information on urinary incontinence and treatment options given to patient.       Patient Instructions   You will be called to schedule thyroid ultrasound.    Bone density in 2027.    No labs today - these looked great last year!    Omeprazole refilled.    See hand specialist at [facility] for hand nodule.     Subjective   Mago is a 68 year old, presenting for the following:  Medicare Visit        8/22/2024     1:05 PM   Additional Questions   Roomed by Sakina   Accompanied by none         8/22/2024     1:05 PM   Patient Reported Additional Medications   Patient reports taking the following new medications none         Health Care Directive  Patient does not have a Health Care Directive or Living Will: Patient states has Advance Directive and will bring in a copy to clinic.    HPI    Thyroid nodule - wondering if she needs US this year.    Hand nodule - noticed in R hand, not painful or affecting ROM.              8/17/2024   General Health   How would you rate your overall physical health? Good   Feel stress (tense, anxious, or unable to sleep) Not at all            8/17/2024   Nutrition   Diet: Gluten-free/reduced            8/17/2024   Exercise   Days per week of moderate/strenous exercise 7 days   Average minutes spent exercising at this level 90 min            8/17/2024   Social Factors   Frequency of gathering with friends or relatives Three times a week   Worry food won't last until get money to buy more No   Food not last or not have enough money for food? No   Do you have housing? (Housing is defined as stable permanent housing and does not include staying ouside in a car, in a tent, in an abandoned building, in an overnight shelter, or couch-surfing.) Yes   Are you worried about losing your housing? No   Lack of transportation? No   Unable to get utilities (heat,electricity)? No            8/17/2024   Fall Risk   Fallen 2 or more times in the past year? No    No   Trouble with walking or balance? No    No       Multiple values from one day are sorted in reverse-chronological order          8/17/2024   Activities of Daily Living- Home Safety   Needs help with the following daily activites None of the above   Safety concerns in the home None of the above            8/17/2024   Dental   Dentist two times every year? Yes            8/17/2024   Hearing Screening   Hearing concerns? None of the above            8/17/2024   Driving Risk Screening   Patient/family members have concerns about driving No            8/17/2024   General Alertness/Fatigue Screening   Have you been more tired than usual lately? No            8/17/2024   Urinary Incontinence Screening   Bothered by leaking urine in past 6 months Yes            8/17/2024   TB Screening   Were you born outside of the [place]? No            Today's PHQ-2 Score:       8/22/2024     1:04 PM    PHQ-2 ( 1999 [employer])   Q1: Little interest or pleasure in doing things 0   Q2: Feeling down, depressed or hopeless 0   PHQ-2 Score 0   Q1: Little interest or pleasure in doing things Not at all   Q2: Feeling down, depressed or hopeless Not at all   PHQ-2 Score 0           8/17/2024   Substance Use   Alcohol more than 3/day or more than 7/wk No   Do you have a current opioid prescription? No   How severe/bad is pain from 1 to 10? 0/10 (No Pain)   Do you use any other substances recreationally? No        Social History     Tobacco Use    Smoking status: Never    Smokeless tobacco: Never   Vaping Use    Vaping status: Never Used   Substance Use Topics    Alcohol use: Yes     Comment: Seldom    Drug use: No           5/30/2024   LAST FHS-7 RESULTS   1st degree relative breast or ovarian cancer No   Any relative bilateral breast cancer No   Any male have breast cancer No   Any ONE woman have BOTH breast AND ovarian cancer No   Any woman with breast cancer before 50yrs No   2 or more relatives with breast AND/OR ovarian cancer No   2 or more relatives with breast AND/OR bowel cancer No           Mammogram Screening - Annual screen due to history of breast cancer, carcinoma in situ, or hyperplasia      History of abnormal Pap smear: No - age 65 or older with adequate negative prior screening test results (3 consecutive negative cytology results, 2 consecutive negative cotesting results, or 2 consecutive negative HrHPV test results within 10 years, with the most recent test occurring within the recommended screening interval for the test used)        Latest Ref Rng & Units 9/9/2016     8:22 PM 5/6/2013    12:00 AM   PAP / HPV   PAP (Historical)  NIL     HPV 16 DNA NEG Negative     HPV 18 DNA NEG Negative     Other HR HPV NEG Negative     PAP-ABSTRACT   See Scanned Document           This result is from an external source.     ASCVD Risk   The ASCVD Risk score (Ab JONES, et al., 2019) failed to calculate for the  "following reasons:    The valid systolic blood pressure range is 90 to 200 mmHg            Reviewed and updated as needed this visit by Provider                      Current providers sharing in care for this patient include:  Patient Care Team:  Hanny Kunz MD as PCP - General (Internal Medicine)  Joyce Barillas MD as MD (Hematology)  Karena Tafoya, RN as Continuity Care Coordinator  Joyce Barillas MD as Assigned Cancer Care Provider  Hanny Kunz MD as Assigned PCP  Yeo, Albert, MD as Assigned Musculoskeletal Provider  Karena Small RN as Specialty Care Coordinator (Hematology & Oncology)    The following health maintenance items are reviewed in Epic and correct as of today:  Health Maintenance   Topic Date Due    ANNUAL REVIEW OF HM ORDERS  06/22/2023    COVID-19 Vaccine (7 - 2023-24 season) 03/07/2024    MEDICARE ANNUAL WELLNESS VISIT  08/03/2024    INFLUENZA VACCINE (1) 09/01/2024    MAMMO SCREENING  05/30/2025    DTAP/TDAP/TD IMMUNIZATION (4 - Td or Tdap) 07/03/2025    FALL RISK ASSESSMENT  08/22/2025    GLUCOSE  06/15/2026    LIPID  08/03/2028    COLORECTAL CANCER SCREENING  05/03/2029    ADVANCE CARE PLANNING  08/22/2029    DEXA  04/25/2037    HEPATITIS C SCREENING  Completed    PHQ-2 (once per calendar year)  Completed    Pneumococcal Vaccine: 65+ Years  Completed    ZOSTER IMMUNIZATION  Completed    RSV VACCINE (Pregnancy & 60+)  Completed    HPV IMMUNIZATION  Aged Out    MENINGITIS IMMUNIZATION  Aged Out    RSV MONOCLONAL ANTIBODY  Aged Out         Review of Systems  Constitutional, neuro, ENT, endocrine, pulmonary, cardiac, gastrointestinal, genitourinary, musculoskeletal, integument and psychiatric systems are negative, except as otherwise noted.     Objective    Exam  BP (!) 88/60 (BP Location: Right arm, Patient Position: Sitting, Cuff Size: Adult Regular)   Pulse 82   Temp 98.2  F (36.8  C) (Tympanic)   Resp 16   Ht 1.702 m (5' 7\")   Wt 69.4 kg (153 lb " "1.6 oz)   LMP  (LMP Unknown)   SpO2 99%   BMI 23.98 kg/m     Estimated body mass index is 23.98 kg/m  as calculated from the following:    Height as of this encounter: 1.702 m (5' 7\").    Weight as of this encounter: 69.4 kg (153 lb 1.6 oz).    Physical Exam  GENERAL: alert and no distress  EYES: Eyes grossly normal to inspection, PERRL and conjunctivae and sclerae normal  HENT: ear canals and TM's normal, nose and mouth without ulcers or lesions  NECK: no adenopathy, no asymmetry, masses, or scars  RESP: lungs clear to auscultation - no rales, rhonchi or wheezes  CV: regular rate and rhythm, normal S1 S2, no S3 or S4, no murmur, click or rub, no peripheral edema  ABDOMEN: soft, nontender, no hepatosplenomegaly, no masses and bowel sounds normal  MS: no gross musculoskeletal defects noted, no edema  SKIN: no suspicious lesions or rashes  NEURO: Normal strength and tone, mentation intact and speech normal  PSYCH: mentation appears normal, affect normal/bright        8/22/2024   Mini Cog   Clock Draw Score 2 Normal   3 Item Recall 3 objects recalled   Mini Cog Total Score 5                 Signed Electronically by: Hanny Nava MD    "

## 2024-09-06 ENCOUNTER — HOSPITAL ENCOUNTER (OUTPATIENT)
Dept: ULTRASOUND IMAGING | Facility: CLINIC | Age: 69
Discharge: HOME | End: 2024-09-06
Attending: INTERNAL MEDICINE | Admitting: INTERNAL MEDICINE
Payer: COMMERCIAL

## 2024-09-06 DIAGNOSIS — E04.1 THYROID NODULE: ICD-10-CM

## 2024-09-06 PROCEDURE — 76536 US EXAM OF HEAD AND NECK: CPT

## 2024-09-10 ENCOUNTER — TRANSFERRED RECORDS (OUTPATIENT)
Dept: HEALTH INFORMATION MANAGEMENT | Facility: CLINIC | Age: 69
End: 2024-09-10
Payer: COMMERCIAL

## 2024-10-29 ENCOUNTER — OFFICE VISIT (OUTPATIENT)
Dept: ORTHOPEDICS | Facility: CLINIC | Age: 69
End: 2024-10-29
Payer: COMMERCIAL

## 2024-10-29 VITALS
WEIGHT: 153 LBS | BODY MASS INDEX: 24.01 KG/M2 | SYSTOLIC BLOOD PRESSURE: 106 MMHG | HEIGHT: 67 IN | DIASTOLIC BLOOD PRESSURE: 57 MMHG

## 2024-10-29 DIAGNOSIS — M17.12 PRIMARY OSTEOARTHRITIS OF LEFT KNEE: Primary | ICD-10-CM

## 2024-10-29 DIAGNOSIS — M17.11 PRIMARY OSTEOARTHRITIS OF RIGHT KNEE: ICD-10-CM

## 2024-10-29 PROCEDURE — 99214 OFFICE O/P EST MOD 30 MIN: CPT | Performed by: FAMILY MEDICINE

## 2024-10-29 PROCEDURE — G2211 COMPLEX E/M VISIT ADD ON: HCPCS | Performed by: FAMILY MEDICINE

## 2024-10-29 NOTE — LETTER
10/29/2024      Mago Santiago  [address] [place] [address]      Dear Colleague,    Thank you for referring your patient, Mago Santiago, to the [facility] [place]. Please see a copy of my visit note below.    ASSESSMENT & PLAN  Patient Instructions     1. Primary osteoarthritis of left knee    2. Primary osteoarthritis of right knee      -Patient is following up for chronic bilateral knee pain due to arthritis  -Patient reports only 2 months of relief from her last cortisone injection  -Patient will start formal physical therapy and home exercise program to strengthen her hip and leg muscles to allow her to continue walking and traveling  -We will hold off on cortisone injections at this time  -May continue with over-the-counter pain medications as needed  -Patient may wear her knee sleeves when she is more active  -Patient will continue follow-up with me for further treatment and recommendations.  -Call direct clinic number [[phone]] at any time with questions or concerns.    Albert Yeo MD [facility] Orthoped[facility] and Sports Medicine  [facility] [place]        -----    SUBJECTIVE:  Mago Santiago is a 69 year old female who is seen in follow-up for right knee pain.They were last seen 7/23/2024.     Since their last visit reports worsening pain. They indicate that their current pain level is 6/10. They have tried rest/activity avoidance, Tylenol, Ibuprofen, home exercises, corticosteroid injection (most recent date: 07/23/24) that provided  2 month(s) of relief, and viscosupplementation injection (most recent date: 10/30/23) that provided no relief.      Patient reports they were doing extensive hiking that aggravated their right knee pain.      The patient is seen by themselves.    Patient's past medical, surgical, social, and family histories were reviewed today and no changes are noted.    REVIEW OF SYSTEMS:  Constitutional: NEGATIVE for fever,  "chills, change in weight  Skin: NEGATIVE for worrisome rashes, moles or lesions  GI/: NEGATIVE for bowel or bladder changes  Neuro: NEGATIVE for weakness, dizziness or paresthesias    OBJECTIVE:  /57   Ht 1.702 m (5' 7\")   Wt 69.4 kg (153 lb)   LMP  (LMP Unknown)   BMI 23.96 kg/m     General: healthy, alert and in no distress  HEENT: no scleral icterus or conjunctival erythema  Skin: no suspicious lesions or rash. No jaundice.  CV: regular rhythm by palpation, no pedal edema  Resp: normal respiratory effort without conversational dyspnea   Psych: normal mood and affect  Gait: normal steady gait with appropriate coordination and balance  Neuro: normal light touch sensory exam of the extremities.    MSK:  BILATERAL KNEE  Inspection:    Normal alignment; no edema, erythema, or ecchymosis present  Palpation:    Tender about the lateral patellar facet, medial patellar facet and medial joint line. Remainder of bony and ligamentous landmarks are nontender.    Trace effusion is present    Patellofemoral crepitus is Absent  Range of Motion:     00 extension to 1200 flexion  Strength:    Quadriceps grossly intact    Extensor mechanism intact  Special Tests:    Positive: none    Negative: MCL/valgus stress (0 & 30 deg), LCL/varus stress (0 & 30 deg), Lachman's, anterior drawer, posterior drawer, Dacia's    Independent visualization of the below image:      Albert Yeo MD, [facility]      Again, thank you for allowing me to participate in the care of your patient.        Sincerely,        Albert Yeo, MD  "

## 2024-10-29 NOTE — PROGRESS NOTES
"ASSESSMENT & PLAN  Patient Instructions     1. Primary osteoarthritis of left knee    2. Primary osteoarthritis of right knee      -Patient is following up for chronic bilateral knee pain due to arthritis  -Patient reports only 2 months of relief from her last cortisone injection  -Patient will start formal physical therapy and home exercise program to strengthen her hip and leg muscles to allow her to continue walking and traveling  -We will hold off on cortisone injections at this time  -May continue with over-the-counter pain medications as needed  -Patient may wear her knee sleeves when she is more active  -Patient will continue follow-up with me for further treatment and recommendations.  -Call direct clinic number [[phone]] at any time with questions or concerns.    Albert Yeo MD [facility]  [facility]        -----    SUBJECTIVE:  Mago Santiago is a 69 year old female who is seen in follow-up for right knee pain.They were last seen 7/23/2024.     Since their last visit reports worsening pain. They indicate that their current pain level is 6/10. They have tried rest/activity avoidance, Tylenol, Ibuprofen, home exercises, corticosteroid injection (most recent date: 07/23/24) that provided  2 month(s) of relief, and viscosupplementation injection (most recent date: 10/30/23) that provided no relief.      Patient reports they were doing extensive hiking that aggravated their right knee pain.      The patient is seen by themselves.    Patient's past medical, surgical, social, and family histories were reviewed today and no changes are noted.    REVIEW OF SYSTEMS:  Constitutional: NEGATIVE for fever, chills, change in weight  Skin: NEGATIVE for worrisome rashes, moles or lesions  GI/: NEGATIVE for bowel or bladder changes  Neuro: NEGATIVE for weakness, dizziness or paresthesias    OBJECTIVE:  /57   Ht 1.702 m (5' 7\")   Wt 69.4 kg (153 lb)   LMP "  (LMP Unknown)   BMI 23.96 kg/m     General: healthy, alert and in no distress  HEENT: no scleral icterus or conjunctival erythema  Skin: no suspicious lesions or rash. No jaundice.  CV: regular rhythm by palpation, no pedal edema  Resp: normal respiratory effort without conversational dyspnea   Psych: normal mood and affect  Gait: normal steady gait with appropriate coordination and balance  Neuro: normal light touch sensory exam of the extremities.    MSK:  BILATERAL KNEE  Inspection:    Normal alignment; no edema, erythema, or ecchymosis present  Palpation:    Tender about the lateral patellar facet, medial patellar facet and medial joint line. Remainder of bony and ligamentous landmarks are nontender.    Trace effusion is present    Patellofemoral crepitus is Absent  Range of Motion:     00 extension to 1200 flexion  Strength:    Quadriceps grossly intact    Extensor mechanism intact  Special Tests:    Positive: none    Negative: MCL/valgus stress (0 & 30 deg), LCL/varus stress (0 & 30 deg), Lachman's, anterior drawer, posterior drawer, Dacia's    Independent visualization of the below image:      Albert Yeo MD, CAM  [facility]

## 2024-10-29 NOTE — PATIENT INSTRUCTIONS
1. Primary osteoarthritis of left knee    2. Primary osteoarthritis of right knee      -Patient is following up for chronic bilateral knee pain due to arthritis  -Patient reports only 2 months of relief from her last cortisone injection  -Patient will start formal physical therapy and home exercise program to strengthen her hip and leg muscles to allow her to continue walking and traveling  -We will hold off on cortisone injections at this time  -May continue with over-the-counter pain medications as needed  -Patient may wear her knee sleeves when she is more active  -Patient will continue follow-up with me for further treatment and recommendations.  -Call direct clinic number [[phone]] at any time with questions or concerns.    Albert Yeo MD [facility]  [facility] [place]

## 2024-10-30 ENCOUNTER — TELEPHONE (OUTPATIENT)
Dept: ORTHOPEDICS | Facility: CLINIC | Age: 69
End: 2024-10-30
Payer: COMMERCIAL

## 2024-10-30 NOTE — TELEPHONE ENCOUNTER
PT orders faxed.    Called and spoke with patient. Informed her orders were faxed per her request. She verbalized understanding and was appreciative of call back.    Briana Forde ATC

## 2024-10-30 NOTE — TELEPHONE ENCOUNTER
Other: Mago called she needs to have her referral for physical therapy faxed to [facility] in [place] at [phone]. Please contact patient to let her know when this has been done     Could we send this information to you in [employer] or would you prefer to receive a phone call?:   Patient would prefer a phone call   Okay to leave a detailed message?: Yes at Cell number on file:    Telephone Information:   Mobile [phone]

## 2024-11-12 ENCOUNTER — TRANSFERRED RECORDS (OUTPATIENT)
Dept: HEALTH INFORMATION MANAGEMENT | Facility: CLINIC | Age: 69
End: 2024-11-12
Payer: COMMERCIAL

## 2025-02-08 ENCOUNTER — MYC REFILL (OUTPATIENT)
Dept: PEDIATRICS | Facility: CLINIC | Age: 70
End: 2025-02-08
Payer: COMMERCIAL

## 2025-02-08 DIAGNOSIS — J34.89 SINUS PRESSURE: ICD-10-CM

## 2025-02-08 DIAGNOSIS — J02.9 SORE THROAT: ICD-10-CM

## 2025-02-10 RX ORDER — OMEPRAZOLE 20 MG/1
20 CAPSULE, DELAYED RELEASE ORAL 2 TIMES DAILY
Qty: 60 CAPSULE | Refills: 3 | Status: SHIPPED | OUTPATIENT
Start: 2025-02-10

## 2025-02-10 RX ORDER — FLUTICASONE PROPIONATE 50 MCG
1 SPRAY, SUSPENSION (ML) NASAL DAILY
Qty: 16 ML | Refills: 1 | Status: SHIPPED | OUTPATIENT
Start: 2025-02-10

## 2025-03-26 ENCOUNTER — LAB (OUTPATIENT)
Dept: LAB | Facility: CLINIC | Age: 70
End: 2025-03-26
Payer: COMMERCIAL

## 2025-03-26 ENCOUNTER — E-VISIT (OUTPATIENT)
Dept: PEDIATRICS | Facility: CLINIC | Age: 70
End: 2025-03-26
Payer: COMMERCIAL

## 2025-03-26 DIAGNOSIS — R30.0 DYSURIA: ICD-10-CM

## 2025-03-26 DIAGNOSIS — R31.9 HEMATURIA, UNSPECIFIED TYPE: ICD-10-CM

## 2025-03-26 DIAGNOSIS — R30.0 DYSURIA: Primary | ICD-10-CM

## 2025-03-26 LAB
ALBUMIN UR-MCNC: NEGATIVE MG/DL
APPEARANCE UR: CLEAR
BACTERIA #/AREA URNS HPF: ABNORMAL /HPF
BILIRUB UR QL STRIP: NEGATIVE
COLOR UR AUTO: YELLOW
GLUCOSE UR STRIP-MCNC: NEGATIVE MG/DL
HGB UR QL STRIP: ABNORMAL
KETONES UR STRIP-MCNC: NEGATIVE MG/DL
LEUKOCYTE ESTERASE UR QL STRIP: ABNORMAL
NITRATE UR QL: NEGATIVE
PH UR STRIP: 7 [PH] (ref 5–7)
RBC #/AREA URNS AUTO: ABNORMAL /HPF
SP GR UR STRIP: 1.02 (ref 1–1.03)
SQUAMOUS #/AREA URNS AUTO: ABNORMAL /LPF
UROBILINOGEN UR STRIP-ACNC: 0.2 E.U./DL
WBC #/AREA URNS AUTO: ABNORMAL /HPF

## 2025-03-26 PROCEDURE — 81001 URINALYSIS AUTO W/SCOPE: CPT

## 2025-03-27 ENCOUNTER — LAB (OUTPATIENT)
Dept: LAB | Facility: CLINIC | Age: 70
End: 2025-03-27
Payer: COMMERCIAL

## 2025-03-27 DIAGNOSIS — R31.9 HEMATURIA, UNSPECIFIED TYPE: ICD-10-CM

## 2025-03-27 LAB
ALBUMIN UR-MCNC: NEGATIVE MG/DL
APPEARANCE UR: CLEAR
BACTERIA #/AREA URNS HPF: ABNORMAL /HPF
BILIRUB UR QL STRIP: NEGATIVE
COLOR UR AUTO: YELLOW
GLUCOSE UR STRIP-MCNC: NEGATIVE MG/DL
HGB UR QL STRIP: ABNORMAL
KETONES UR STRIP-MCNC: NEGATIVE MG/DL
LEUKOCYTE ESTERASE UR QL STRIP: ABNORMAL
NITRATE UR QL: NEGATIVE
PH UR STRIP: 6 [PH] (ref 5–7)
RBC #/AREA URNS AUTO: ABNORMAL /HPF
SP GR UR STRIP: 1.01 (ref 1–1.03)
UROBILINOGEN UR STRIP-ACNC: 0.2 E.U./DL
WBC #/AREA URNS AUTO: ABNORMAL /HPF

## 2025-03-27 RX ORDER — CIPROFLOXACIN 250 MG/1
250 TABLET, FILM COATED ORAL 2 TIMES DAILY
Qty: 3 TABLET | Refills: 0 | Status: SHIPPED | OUTPATIENT
Start: 2025-03-27 | End: 2025-03-27

## 2025-03-27 RX ORDER — CIPROFLOXACIN 250 MG/1
250 TABLET, FILM COATED ORAL 2 TIMES DAILY
Qty: 6 TABLET | Refills: 0 | Status: SHIPPED | OUTPATIENT
Start: 2025-03-27

## 2025-03-27 NOTE — TELEPHONE ENCOUNTER
Called and reviewed results with patient. She does have asymptomatic microscopic hematuria but no WBCs. No symptoms of flank pain, stones. No fevers, but other symptoms of UTI. Will add on urine culture and preemptively treat with antibiotics (prescription sent for ciprofloxacin 250mg BID x3 days, reviewed medication risks).     Plan to repeat UA in 2 weeks - if persistent hematuria, or if symptoms persist and urine culture is negative, will proceed with referral to urology and CT urogram for work-up of microscopic hematuria.    Patient in agreement with plan.     Hanny Kunz MD  Internal Medicine-Pediatrics

## 2025-03-29 ENCOUNTER — HEALTH MAINTENANCE LETTER (OUTPATIENT)
Age: 70
End: 2025-03-29

## 2025-03-31 NOTE — TELEPHONE ENCOUNTER
MEDICAL RECORDS REQUEST   [facility]  [facility]  [address] [place] [address]  PHONE: [phone]  Fax: [phone]        FUTURE VISIT INFORMATION                                                   Mago Santiago, : 1955 scheduled for future visit at [facility]    APPOINTMENT INFORMATION:  Date: 2025  Provider:  Mariusz Nichole MD  Reason for Visit/Diagnosis: Hematuria      RECORDS REQUESTED FOR VISIT                                                     NOTES  STATUS/DETAILS   OFFICE NOTE from other specialist  yes   DISCHARGE REPORT from the ER  yes   MEDICATION LIST  yes   LABS     URINALYSIS (UA)  yes   IMAGES  yes, SCHEDULE FOR 2025 -- CT UROGRAM     PRE-VISIT CHECKLIST      Joint diagnostic appointment coordinated correctly          (ensure right order & amount of time) Yes   RECORD COLLECTION COMPLETE Yes

## 2025-04-15 ENCOUNTER — PATIENT OUTREACH (OUTPATIENT)
Dept: CARE COORDINATION | Facility: CLINIC | Age: 70
End: 2025-04-15
Payer: COMMERCIAL

## 2025-04-23 ENCOUNTER — HOSPITAL ENCOUNTER (OUTPATIENT)
Dept: CT IMAGING | Facility: CLINIC | Age: 70
Discharge: HOME | End: 2025-04-23
Attending: INTERNAL MEDICINE
Payer: COMMERCIAL

## 2025-04-23 DIAGNOSIS — R31.9 HEMATURIA, UNSPECIFIED TYPE: ICD-10-CM

## 2025-04-23 LAB
CREAT BLD-MCNC: 1.1 MG/DL (ref 0.5–1)
EGFRCR SERPLBLD CKD-EPI 2021: 54 ML/MIN/1.73M2

## 2025-04-23 PROCEDURE — 74178 CT ABD&PLV WO CNTR FLWD CNTR: CPT

## 2025-04-23 PROCEDURE — 250N000011 HC RX IP 250 OP 636: Performed by: INTERNAL MEDICINE

## 2025-04-23 PROCEDURE — 82565 ASSAY OF CREATININE: CPT

## 2025-04-23 PROCEDURE — 250N000009 HC RX 250: Performed by: INTERNAL MEDICINE

## 2025-04-23 RX ORDER — IOPAMIDOL 755 MG/ML
500 INJECTION, SOLUTION INTRAVASCULAR ONCE
Status: COMPLETED | OUTPATIENT
Start: 2025-04-23 | End: 2025-04-23

## 2025-04-23 RX ADMIN — IOPAMIDOL 76 ML: 755 INJECTION, SOLUTION INTRAVENOUS at 13:36

## 2025-04-23 RX ADMIN — SODIUM CHLORIDE 60 ML: 9 INJECTION, SOLUTION INTRAVENOUS at 13:36

## 2025-04-25 ENCOUNTER — PRE VISIT (OUTPATIENT)
Dept: UROLOGY | Facility: CLINIC | Age: 70
End: 2025-04-25
Payer: COMMERCIAL

## 2025-06-20 ENCOUNTER — ANCILLARY PROCEDURE (OUTPATIENT)
Dept: BONE DENSITY | Facility: CLINIC | Age: 70
End: 2025-06-20
Attending: INTERNAL MEDICINE
Payer: COMMERCIAL

## 2025-06-20 ENCOUNTER — ANCILLARY PROCEDURE (OUTPATIENT)
Dept: MAMMOGRAPHY | Facility: CLINIC | Age: 70
End: 2025-06-20
Attending: INTERNAL MEDICINE
Payer: COMMERCIAL

## 2025-06-20 DIAGNOSIS — M85.88 OSTEOPENIA OF LUMBAR SPINE: ICD-10-CM

## 2025-06-20 DIAGNOSIS — Z12.31 VISIT FOR SCREENING MAMMOGRAM: ICD-10-CM

## 2025-06-20 PROCEDURE — 77067 SCR MAMMO BI INCL CAD: CPT | Mod: TC | Performed by: RADIOLOGY

## 2025-06-20 PROCEDURE — 77063 BREAST TOMOSYNTHESIS BI: CPT | Mod: TC | Performed by: RADIOLOGY

## 2025-06-20 PROCEDURE — 77080 DXA BONE DENSITY AXIAL: CPT | Mod: TC | Performed by: RADIOLOGY

## 2025-07-12 DIAGNOSIS — J02.9 SORE THROAT: ICD-10-CM

## 2025-07-14 RX ORDER — OMEPRAZOLE 20 MG/1
20 CAPSULE, DELAYED RELEASE ORAL 2 TIMES DAILY
Qty: 180 CAPSULE | Refills: 3 | Status: SHIPPED | OUTPATIENT
Start: 2025-07-14